# Patient Record
Sex: FEMALE | Race: OTHER | HISPANIC OR LATINO | URBAN - METROPOLITAN AREA
[De-identification: names, ages, dates, MRNs, and addresses within clinical notes are randomized per-mention and may not be internally consistent; named-entity substitution may affect disease eponyms.]

---

## 2018-01-16 ENCOUNTER — EMERGENCY (EMERGENCY)
Facility: HOSPITAL | Age: 23
LOS: 1 days | Discharge: HOME | End: 2018-01-16
Attending: EMERGENCY MEDICINE
Payer: MEDICAID

## 2018-01-16 VITALS
WEIGHT: 149.91 LBS | OXYGEN SATURATION: 99 % | RESPIRATION RATE: 20 BRPM | HEART RATE: 82 BPM | SYSTOLIC BLOOD PRESSURE: 108 MMHG | TEMPERATURE: 98 F | HEIGHT: 62 IN | DIASTOLIC BLOOD PRESSURE: 70 MMHG

## 2018-01-16 PROCEDURE — 99284 EMERGENCY DEPT VISIT MOD MDM: CPT | Mod: 25

## 2018-01-16 RX ORDER — SODIUM CHLORIDE 9 MG/ML
3 INJECTION INTRAMUSCULAR; INTRAVENOUS; SUBCUTANEOUS ONCE
Qty: 0 | Refills: 0 | Status: COMPLETED | OUTPATIENT
Start: 2018-01-16 | End: 2018-01-16

## 2018-01-17 LAB
ALBUMIN SERPL ELPH-MCNC: 3.7 G/DL — SIGNIFICANT CHANGE UP (ref 3.5–5)
ALP SERPL-CCNC: 40 U/L — SIGNIFICANT CHANGE UP (ref 40–120)
ALT FLD-CCNC: 14 U/L DA — SIGNIFICANT CHANGE UP (ref 10–60)
ANION GAP SERPL CALC-SCNC: 8 MMOL/L — SIGNIFICANT CHANGE UP (ref 5–17)
APPEARANCE UR: CLEAR — SIGNIFICANT CHANGE UP
AST SERPL-CCNC: 11 U/L — SIGNIFICANT CHANGE UP (ref 10–40)
BASOPHILS # BLD AUTO: 0.1 K/UL — SIGNIFICANT CHANGE UP (ref 0–0.2)
BASOPHILS NFR BLD AUTO: 0.7 % — SIGNIFICANT CHANGE UP (ref 0–2)
BILIRUB SERPL-MCNC: 0.3 MG/DL — SIGNIFICANT CHANGE UP (ref 0.2–1.2)
BILIRUB UR-MCNC: NEGATIVE — SIGNIFICANT CHANGE UP
BUN SERPL-MCNC: 10 MG/DL — SIGNIFICANT CHANGE UP (ref 7–18)
CALCIUM SERPL-MCNC: 8.6 MG/DL — SIGNIFICANT CHANGE UP (ref 8.4–10.5)
CHLORIDE SERPL-SCNC: 104 MMOL/L — SIGNIFICANT CHANGE UP (ref 96–108)
CO2 SERPL-SCNC: 23 MMOL/L — SIGNIFICANT CHANGE UP (ref 22–31)
COLOR SPEC: YELLOW — SIGNIFICANT CHANGE UP
CREAT SERPL-MCNC: 0.5 MG/DL — SIGNIFICANT CHANGE UP (ref 0.5–1.3)
DIFF PNL FLD: ABNORMAL
EOSINOPHIL # BLD AUTO: 0.2 K/UL — SIGNIFICANT CHANGE UP (ref 0–0.5)
EOSINOPHIL NFR BLD AUTO: 1.9 % — SIGNIFICANT CHANGE UP (ref 0–6)
GLUCOSE SERPL-MCNC: 80 MG/DL — SIGNIFICANT CHANGE UP (ref 70–99)
GLUCOSE UR QL: NEGATIVE — SIGNIFICANT CHANGE UP
HCG SERPL-ACNC: SIGNIFICANT CHANGE UP MIU/ML
HCT VFR BLD CALC: 34.2 % — LOW (ref 34.5–45)
HGB BLD-MCNC: 9.9 G/DL — LOW (ref 11.5–15.5)
KETONES UR-MCNC: NEGATIVE — SIGNIFICANT CHANGE UP
LEUKOCYTE ESTERASE UR-ACNC: ABNORMAL
LYMPHOCYTES # BLD AUTO: 1.7 K/UL — SIGNIFICANT CHANGE UP (ref 1–3.3)
LYMPHOCYTES # BLD AUTO: 18.5 % — SIGNIFICANT CHANGE UP (ref 13–44)
MCHC RBC-ENTMCNC: 21.2 PG — LOW (ref 27–34)
MCHC RBC-ENTMCNC: 29 GM/DL — LOW (ref 32–36)
MCV RBC AUTO: 73 FL — LOW (ref 80–100)
MONOCYTES # BLD AUTO: 0.8 K/UL — SIGNIFICANT CHANGE UP (ref 0–0.9)
MONOCYTES NFR BLD AUTO: 8.4 % — SIGNIFICANT CHANGE UP (ref 2–14)
NEUTROPHILS # BLD AUTO: 6.5 K/UL — SIGNIFICANT CHANGE UP (ref 1.8–7.4)
NEUTROPHILS NFR BLD AUTO: 70.5 % — SIGNIFICANT CHANGE UP (ref 43–77)
NITRITE UR-MCNC: NEGATIVE — SIGNIFICANT CHANGE UP
PH UR: 5 — SIGNIFICANT CHANGE UP (ref 5–8)
PLATELET # BLD AUTO: 262 K/UL — SIGNIFICANT CHANGE UP (ref 150–400)
POTASSIUM SERPL-MCNC: 3.7 MMOL/L — SIGNIFICANT CHANGE UP (ref 3.5–5.3)
POTASSIUM SERPL-SCNC: 3.7 MMOL/L — SIGNIFICANT CHANGE UP (ref 3.5–5.3)
PROT SERPL-MCNC: 7.3 G/DL — SIGNIFICANT CHANGE UP (ref 6–8.3)
PROT UR-MCNC: NEGATIVE — SIGNIFICANT CHANGE UP
RBC # BLD: 4.68 M/UL — SIGNIFICANT CHANGE UP (ref 3.8–5.2)
RBC # FLD: 17.7 % — HIGH (ref 10.3–14.5)
SODIUM SERPL-SCNC: 135 MMOL/L — SIGNIFICANT CHANGE UP (ref 135–145)
SP GR SPEC: 1.02 — SIGNIFICANT CHANGE UP (ref 1.01–1.02)
UROBILINOGEN FLD QL: NEGATIVE — SIGNIFICANT CHANGE UP
WBC # BLD: 9.2 K/UL — SIGNIFICANT CHANGE UP (ref 3.8–10.5)
WBC # FLD AUTO: 9.2 K/UL — SIGNIFICANT CHANGE UP (ref 3.8–10.5)

## 2018-01-17 PROCEDURE — 86850 RBC ANTIBODY SCREEN: CPT

## 2018-01-17 PROCEDURE — 81001 URINALYSIS AUTO W/SCOPE: CPT

## 2018-01-17 PROCEDURE — 86900 BLOOD TYPING SEROLOGIC ABO: CPT

## 2018-01-17 PROCEDURE — 76817 TRANSVAGINAL US OBSTETRIC: CPT | Mod: 26

## 2018-01-17 PROCEDURE — 76815 OB US LIMITED FETUS(S): CPT | Mod: 26

## 2018-01-17 PROCEDURE — 76830 TRANSVAGINAL US NON-OB: CPT

## 2018-01-17 PROCEDURE — 86901 BLOOD TYPING SEROLOGIC RH(D): CPT

## 2018-01-17 PROCEDURE — 85027 COMPLETE CBC AUTOMATED: CPT

## 2018-01-17 PROCEDURE — 84702 CHORIONIC GONADOTROPIN TEST: CPT

## 2018-01-17 PROCEDURE — 80053 COMPREHEN METABOLIC PANEL: CPT

## 2018-01-17 PROCEDURE — 76801 OB US < 14 WKS SINGLE FETUS: CPT

## 2018-01-17 PROCEDURE — 99284 EMERGENCY DEPT VISIT MOD MDM: CPT | Mod: 25

## 2018-01-17 RX ORDER — ACETAMINOPHEN 500 MG
650 TABLET ORAL ONCE
Qty: 0 | Refills: 0 | Status: COMPLETED | OUTPATIENT
Start: 2018-01-17 | End: 2018-01-17

## 2018-01-17 RX ADMIN — SODIUM CHLORIDE 3 MILLILITER(S): 9 INJECTION INTRAMUSCULAR; INTRAVENOUS; SUBCUTANEOUS at 00:45

## 2018-01-17 NOTE — ED PROVIDER NOTE - MEDICAL DECISION MAKING DETAILS
feels better. labs, ua, sono reassuring.   Discussed anticipatory guidance and return precautions. Discussed results and gave copy to pt.  Dc with outpt follow up.

## 2018-01-17 NOTE — ED PROVIDER NOTE - OBJECTIVE STATEMENT
21 y/o female with no significant PMHx presents to the ED c/o worsening abd pain x 3 weeks. Pt notes she is currently 8 weeks pregnant and she has been having pain and discomfort in her L ovary. Pt notes the pain used to be intermittent and last about 5 mins but has been constant today. Pt notes her LMP x 2 months in early Nov and notes taking Plan B in Nov after her LMP. Pt took a pregnancy test afterwards to figure out she was pregnant, Pt denies N/V/D, or any other complaints. NKDA. 23 y/o female with no significant PMHx presents to the ED c/o worsening abd pain x 3 weeks. Pt notes she is currently 8 weeks pregnant and she has been having pain and discomfort in her L ovary. Pt notes the pain used to be intermittent and last about 5 mins but has been constant today. Pt notes her LMP x 2 months in early Nov and notes taking Plan B in Nov after her LMP. Pt took a pregnancy test afterwards to figure out she was pregnant, Pt denies N/V/D, or any other complaints. NKDA. No vaginal bleeding or dc now.

## 2021-03-05 ENCOUNTER — INPATIENT (INPATIENT)
Facility: HOSPITAL | Age: 26
LOS: 4 days | Discharge: HOME | DRG: 176 | End: 2021-03-10
Attending: INTERNAL MEDICINE | Admitting: INTERNAL MEDICINE
Payer: MEDICAID

## 2021-03-05 VITALS
HEIGHT: 62 IN | DIASTOLIC BLOOD PRESSURE: 60 MMHG | SYSTOLIC BLOOD PRESSURE: 108 MMHG | WEIGHT: 149.03 LBS | HEART RATE: 113 BPM | RESPIRATION RATE: 18 BRPM | TEMPERATURE: 98 F

## 2021-03-05 DIAGNOSIS — I26.99 OTHER PULMONARY EMBOLISM WITHOUT ACUTE COR PULMONALE: ICD-10-CM

## 2021-03-05 DIAGNOSIS — Z29.9 ENCOUNTER FOR PROPHYLACTIC MEASURES, UNSPECIFIED: ICD-10-CM

## 2021-03-05 DIAGNOSIS — S93.05XD DISLOCATION OF LEFT ANKLE JOINT, SUBSEQUENT ENCOUNTER: ICD-10-CM

## 2021-03-05 LAB
ALBUMIN SERPL ELPH-MCNC: 3.8 G/DL — SIGNIFICANT CHANGE UP (ref 3.5–5)
ALP SERPL-CCNC: 59 U/L — SIGNIFICANT CHANGE UP (ref 40–120)
ALT FLD-CCNC: 20 U/L DA — SIGNIFICANT CHANGE UP (ref 10–60)
ANION GAP SERPL CALC-SCNC: 10 MMOL/L — SIGNIFICANT CHANGE UP (ref 5–17)
AST SERPL-CCNC: 8 U/L — LOW (ref 10–40)
BASOPHILS # BLD AUTO: 0.03 K/UL — SIGNIFICANT CHANGE UP (ref 0–0.2)
BASOPHILS NFR BLD AUTO: 0.4 % — SIGNIFICANT CHANGE UP (ref 0–2)
BILIRUB SERPL-MCNC: 0.8 MG/DL — SIGNIFICANT CHANGE UP (ref 0.2–1.2)
BUN SERPL-MCNC: 8 MG/DL — SIGNIFICANT CHANGE UP (ref 7–18)
CALCIUM SERPL-MCNC: 9.1 MG/DL — SIGNIFICANT CHANGE UP (ref 8.4–10.5)
CHLORIDE SERPL-SCNC: 103 MMOL/L — SIGNIFICANT CHANGE UP (ref 96–108)
CHOLEST SERPL-MCNC: 69 MG/DL — SIGNIFICANT CHANGE UP
CO2 SERPL-SCNC: 25 MMOL/L — SIGNIFICANT CHANGE UP (ref 22–31)
CREAT SERPL-MCNC: 0.68 MG/DL — SIGNIFICANT CHANGE UP (ref 0.5–1.3)
D DIMER BLD IA.RAPID-MCNC: 420 NG/ML DDU — HIGH
EOSINOPHIL # BLD AUTO: 0.04 K/UL — SIGNIFICANT CHANGE UP (ref 0–0.5)
EOSINOPHIL NFR BLD AUTO: 0.5 % — SIGNIFICANT CHANGE UP (ref 0–6)
GLUCOSE SERPL-MCNC: 80 MG/DL — SIGNIFICANT CHANGE UP (ref 70–99)
HCG UR QL: NEGATIVE — SIGNIFICANT CHANGE UP
HCT VFR BLD CALC: 39.2 % — SIGNIFICANT CHANGE UP (ref 34.5–45)
HDLC SERPL-MCNC: 44 MG/DL — LOW
HGB BLD-MCNC: 12.3 G/DL — SIGNIFICANT CHANGE UP (ref 11.5–15.5)
IMM GRANULOCYTES NFR BLD AUTO: 0.5 % — SIGNIFICANT CHANGE UP (ref 0–1.5)
INR BLD: 1.35 RATIO — HIGH (ref 0.88–1.16)
LIPID PNL WITH DIRECT LDL SERPL: 19 MG/DL — SIGNIFICANT CHANGE UP
LYMPHOCYTES # BLD AUTO: 0.93 K/UL — LOW (ref 1–3.3)
LYMPHOCYTES # BLD AUTO: 11.8 % — LOW (ref 13–44)
MAGNESIUM SERPL-MCNC: 2.3 MG/DL — SIGNIFICANT CHANGE UP (ref 1.6–2.6)
MCHC RBC-ENTMCNC: 26.8 PG — LOW (ref 27–34)
MCHC RBC-ENTMCNC: 31.4 GM/DL — LOW (ref 32–36)
MCV RBC AUTO: 85.4 FL — SIGNIFICANT CHANGE UP (ref 80–100)
MONOCYTES # BLD AUTO: 0.44 K/UL — SIGNIFICANT CHANGE UP (ref 0–0.9)
MONOCYTES NFR BLD AUTO: 5.6 % — SIGNIFICANT CHANGE UP (ref 2–14)
NEUTROPHILS # BLD AUTO: 6.4 K/UL — SIGNIFICANT CHANGE UP (ref 1.8–7.4)
NEUTROPHILS NFR BLD AUTO: 81.2 % — HIGH (ref 43–77)
NON HDL CHOLESTEROL: 25 MG/DL — SIGNIFICANT CHANGE UP
NRBC # BLD: 0 /100 WBCS — SIGNIFICANT CHANGE UP (ref 0–0)
PHOSPHATE SERPL-MCNC: 2.9 MG/DL — SIGNIFICANT CHANGE UP (ref 2.5–4.5)
PLATELET # BLD AUTO: 369 K/UL — SIGNIFICANT CHANGE UP (ref 150–400)
POTASSIUM SERPL-MCNC: 4.1 MMOL/L — SIGNIFICANT CHANGE UP (ref 3.5–5.3)
POTASSIUM SERPL-SCNC: 4.1 MMOL/L — SIGNIFICANT CHANGE UP (ref 3.5–5.3)
PROT SERPL-MCNC: 8 G/DL — SIGNIFICANT CHANGE UP (ref 6–8.3)
PROTHROM AB SERPL-ACNC: 15.8 SEC — HIGH (ref 10.6–13.6)
RAPID RVP RESULT: SIGNIFICANT CHANGE UP
RBC # BLD: 4.59 M/UL — SIGNIFICANT CHANGE UP (ref 3.8–5.2)
RBC # FLD: 13.9 % — SIGNIFICANT CHANGE UP (ref 10.3–14.5)
SARS-COV-2 RNA SPEC QL NAA+PROBE: SIGNIFICANT CHANGE UP
SODIUM SERPL-SCNC: 138 MMOL/L — SIGNIFICANT CHANGE UP (ref 135–145)
TRIGL SERPL-MCNC: 32 MG/DL — SIGNIFICANT CHANGE UP
TROPONIN I SERPL-MCNC: <0.015 NG/ML — SIGNIFICANT CHANGE UP (ref 0–0.04)
TSH SERPL-MCNC: 1.59 UU/ML — SIGNIFICANT CHANGE UP (ref 0.34–4.82)
WBC # BLD: 7.88 K/UL — SIGNIFICANT CHANGE UP (ref 3.8–10.5)
WBC # FLD AUTO: 7.88 K/UL — SIGNIFICANT CHANGE UP (ref 3.8–10.5)

## 2021-03-05 PROCEDURE — 99222 1ST HOSP IP/OBS MODERATE 55: CPT

## 2021-03-05 PROCEDURE — 71045 X-RAY EXAM CHEST 1 VIEW: CPT | Mod: 26

## 2021-03-05 PROCEDURE — 93970 EXTREMITY STUDY: CPT | Mod: 26

## 2021-03-05 PROCEDURE — 71275 CT ANGIOGRAPHY CHEST: CPT | Mod: 26,MA

## 2021-03-05 PROCEDURE — 99285 EMERGENCY DEPT VISIT HI MDM: CPT

## 2021-03-05 RX ORDER — PANTOPRAZOLE SODIUM 20 MG/1
40 TABLET, DELAYED RELEASE ORAL
Refills: 0 | Status: DISCONTINUED | OUTPATIENT
Start: 2021-03-05 | End: 2021-03-10

## 2021-03-05 RX ORDER — ONDANSETRON 8 MG/1
4 TABLET, FILM COATED ORAL ONCE
Refills: 0 | Status: COMPLETED | OUTPATIENT
Start: 2021-03-05 | End: 2021-03-05

## 2021-03-05 RX ORDER — ENOXAPARIN SODIUM 100 MG/ML
70 INJECTION SUBCUTANEOUS EVERY 12 HOURS
Refills: 0 | Status: DISCONTINUED | OUTPATIENT
Start: 2021-03-05 | End: 2021-03-07

## 2021-03-05 RX ORDER — ENOXAPARIN SODIUM 100 MG/ML
67 INJECTION SUBCUTANEOUS ONCE
Refills: 0 | Status: COMPLETED | OUTPATIENT
Start: 2021-03-05 | End: 2021-03-05

## 2021-03-05 RX ADMIN — ONDANSETRON 4 MILLIGRAM(S): 8 TABLET, FILM COATED ORAL at 11:13

## 2021-03-05 RX ADMIN — ENOXAPARIN SODIUM 67 MILLIGRAM(S): 100 INJECTION SUBCUTANEOUS at 19:04

## 2021-03-05 NOTE — H&P ADULT - NSHPPHYSICALEXAM_GEN_ALL_CORE
Vital Signs (24 Hrs):  T(C): 37.6 (03-05-21 @ 15:05), Max: 37.6 (03-05-21 @ 15:05)  HR: 98 (03-05-21 @ 15:05) (92 - 113)  BP: 121/79 (03-05-21 @ 15:05) (100/60 - 121/79)  RR: 18 (03-05-21 @ 15:05) (18 - 19)  SpO2: 97% (03-05-21 @ 15:05) (97% - 99%)

## 2021-03-05 NOTE — CONSULT NOTE ADULT - ASSESSMENT
25y Female with medical history significant of Anemia, herpes presented with chest pain since morning. She sprained her ankle in January. She had discloacation, had closed reduction done after which she was supposed to get MRI done which she never got. She has been walking using crutches, mostly bed bound.   Vascular was consulted because admitting officer noticed decreased pulses on the Rt injured foot . No rest pain ,+excellent palpable Rt PT/DP pulses and on doppler, mild edema , no paresthesia or tingling on foot . All sensation intact     f/up Arterial Duplex  f/up Rt LE CTA   f/u Ortho consult   Other care per primary team   Reconsult vascular prn  25y Female with medical history significant of Anemia, herpes presented with chest pain since morning. She sprained her ankle in January. She had discloacation, had closed reduction done after which she was supposed to get MRI done which she never got. She has been walking using crutches, mostly bed bound.   Vascular was consulted because admitting officer noticed decreased pulses on the Rt injured foot . No rest pain ,+excellent palpable Rt PT/DP pulses and on doppler, mild edema , no paresthesia or tingling on foot . All sensation intact     f/up Arterial Duplex  f/up Rt LE CTA   f/u Ortho consult   Other care per primary team   will follow

## 2021-03-05 NOTE — H&P ADULT - ASSESSMENT
25y Female with medical history significant of Anemia, herpes presented with chest pain since morning.     ED:  CT chest showed PE    Patient is being admitted to [facility] for PE.

## 2021-03-05 NOTE — ED PROVIDER NOTE - PHYSICAL EXAMINATION
PHYSICAL EXAM:  GENERAL: NAD, obese  HEAD:  Atraumatic, Normocephalic, HERPES LABIALIS   EYES:  conjunctiva and sclera clear  NECK: Supple, No JVD, Normal thyroid  CHEST/LUNG: Clear to auscultation. Clear to percussion bilaterally; No rales, rhonchi, wheezing, or rubs  HEART: Regular rate and rhythm; No murmurs, rubs, or gallops  ABDOMEN: Soft, Nontender, Nondistended; Bowel sounds present  NERVOUS SYSTEM:  Alert & Oriented X3,    EXTREMITIES:  2+ Peripheral Pulses, No clubbing, cyanosis, or edema, HERPES FLORENCIO on left middle finger  SKIN: warm dry PHYSICAL EXAM:  GENERAL: NAD, obese  HEAD:  Atraumatic, Normocephalic, HERPES LABIALIS   EYES:  conjunctiva and sclera clear  NECK: Supple, No JVD, Normal thyroid  CHEST/LUNG: Clear to auscultation. Clear to percussion bilaterally; No rales, rhonchi, wheezing, or rubs  HEART: Regular rate and rhythm; No murmurs, rubs, or gallops  ABDOMEN: Soft, Nontender, Nondistended; Bowel sounds present  NERVOUS SYSTEM:  Alert & Oriented X3,    EXTREMITIES:  2+ Peripheral Pulses, No clubbing, cyanosis, or edema, HERPES FLORENCIO on left middle finger  SKIN: warm dry, no chest wall lesions

## 2021-03-05 NOTE — ED ADULT NURSE NOTE - ED STAT RN HANDOFF DETAILS
Pt is alert and oriented x3. No sign of acute distress noted. Safety precaution maintained. Cardiac monitor in progress. Pt is transferred to holding in stable condiiton.

## 2021-03-05 NOTE — ED ADULT NURSE NOTE - RESPIRATION RHYTHM, QM
Amoxicillin for 10 days as directed.     Take probiotic (Floragen, yogurt) while on antibiotic.    Ibuprofen and/or tylenol for fever/ pain/ achiness.    Over the counter cold and sinus medication as needed.  Use medications formulated for people with high blood pressure.    Mucinex may help sinuses drain.    Maintain good oral hydration.    Follow up in 3-4 days if no improvement, sooner if symptoms are getting worse or new concerns.  
regular

## 2021-03-05 NOTE — H&P ADULT - ATTENDING COMMENTS
PATIENT SEEN AND EXAMINED. CASE D/W ER MD AND RESIDENT TEAM. ABOVE ROS/VS/PE REVIEWED AND VERIFIED.    HPI:    25y Female with medical history significant of Anemia, herpes presented with chest pain since morning. She states the chest pain is on left side, sharp pain, lasting for few seconds, not aggravated with exercise. It is associated with palpitations and nausea.. She was diagnosed with Herpes and received Acyclovir and IVF on Feb 23rd.  She had allergic reaction to acyclovir after 1 st dose and later stopped it. She sprained her ankle in January. She had discloacation, had closed reduction done after which she was supposed to get MRI done which she never got. She has been walking using crutches, mostly bed bound.  She is not eating well and lost 12 pounds in a month. Pt is not sexually active and has one partner. Patient denies rash, joint pain, cough, sputum production, fevers/chills/nausea/vomiting, diarrhea, abdominal pain    PLAN:    # PULMONARY EMBOLISM - PLACED ON LOVENOX, REVIEWED CTA CHEST, LOWER EXTREMITY DOPPLER NEGATIVE, VASCULAR SX CONSULT PLACED, HEME/ONC CONSULT - DR. GAN  - NOTED PATIENT IS STILL BREAST FEEDING; LAST PREGNANCY WAS 2 YEARS AGO  - F/U COVID AB  - DENIES FAMILY HX OF AUTOIMMUNE DISEASE, DENIES HX OF MISCARRIAGES, DENIES HX OF COVID ; HAS BEEN NWB AND SEDENTARY SINCE RECENT ANKLE SPRAIN  # S/P RECENT ANKLE SPRAIN WITH DISLOCATION - ? WAS TOLD TO BE NWB - WILL CONSULT ORTHO TO EVALUATE FURTHER  # HX OF ANEMIA  # HX OF HSV  # GI PPX ; ON THERAPEUTIC LOVENOX [SERVES FOR DVT PREVENTION]    CARLOS MERCHANT MD COVERING BLAS MERCHANT MD

## 2021-03-05 NOTE — ED PROVIDER NOTE - CLINICAL SUMMARY MEDICAL DECISION MAKING FREE TEXT BOX
The patient is a 25y Female with PMH of Anemia complaining of chest pain since morning. Will do labs, ekg and imaging. Will reassess again.

## 2021-03-05 NOTE — H&P ADULT - PROBLEM SELECTOR PLAN 1
Presented with dyspnea  Saturating well on RA  CT showed PE  Patient has been immobilized most of the time since JAnuary  Started on lovenox  f/u US doppler of LE  PE is likely provoked Presented with dyspnea  Saturating well on RA  CT showed PE  Patient has been immobilized most of the time since JAnuary  Started on lovenox  f/u US doppler of LE  PE is likely provoked  hem onc consult Dr Hernandez

## 2021-03-05 NOTE — CONSULT NOTE ADULT - PROBLEM SELECTOR RECOMMENDATION 9
I Festus Ragland MD performed a history and physical exam of the patient and discussed  the findings and plan with the house officer. I reviewed the resident note and agree with the findings and plan

## 2021-03-05 NOTE — CONSULT NOTE ADULT - SUBJECTIVE AND OBJECTIVE BOX
VASCULAR SURGERY CONSULT NOTE    chief complaint of Chest pain (05 Mar 2021 18:58)      HPI:  25y Female with medical history significant of Anemia, herpes presented with chest pain since morning. She states the chest pain is on left side, sharp pain, lasting for few seconds, not aggravated with exercise. It is associated with palpitations and nausea.. She was diagnosed with Herpes and received Acyclovir and IVF on Feb 23rd. . She sprained her ankle in January. She had discloacation, had closed reduction done after which she was supposed to get MRI done which she never got. She has been walking using crutches, mostly bed bound.   Patient denies rash, joint pain, cough, sputum production, fevers/chills/nausea/vomiting, diarrhea, abdominal pain (05 Mar 2021 18:58)    Vascular was consulted because admitting officer noticed decreased pulses on the Rt injured foot . Presently, pt is seen with mild edema in rt foot with little to no weight bearing but denies any pain on rt leg , skin changes, paresthesia, or tingling . No other complaints       PAST MEDICAL & SURGICAL HISTORY:  Anemia    No pertinent past medical history    No significant past surgical history        Review of Systems:    I have reviewed 9 systems with the patient and the only positive findings were     MEDICATIONS  (STANDING):  enoxaparin Injectable 70 milliGRAM(s) SubCutaneous every 12 hours  pantoprazole    Tablet 40 milliGRAM(s) Oral before breakfast    MEDICATIONS  (PRN):      Allergies    No Known Allergies    Intolerances        Social History:  Denies smoking or drinking (05 Mar 2021 18:58)      FAMILY HISTORY:      Vital Signs Last 24 Hrs  T(C): 37.5 (05 Mar 2021 19:54), Max: 37.6 (05 Mar 2021 15:05)  T(F): 99.5 (05 Mar 2021 19:54), Max: 99.6 (05 Mar 2021 15:05)  HR: 89 (05 Mar 2021 19:54) (89 - 113)  BP: 110/69 (05 Mar 2021 19:54) (100/60 - 121/79)  BP(mean): --  RR: 18 (05 Mar 2021 19:54) (18 - 19)  SpO2: 99% (05 Mar 2021 19:54) (97% - 99%)    Physical Exam:  Vital Signs Last 24 Hrs  T(C): 37.5 (05 Mar 2021 19:54), Max: 37.6 (05 Mar 2021 15:05)  T(F): 99.5 (05 Mar 2021 19:54), Max: 99.6 (05 Mar 2021 15:05)  HR: 89 (05 Mar 2021 19:54) (89 - 113)  BP: 110/69 (05 Mar 2021 19:54) (100/60 - 121/79)  BP(mean): --  RR: 18 (05 Mar 2021 19:54) (18 - 19)  SpO2: 99% (05 Mar 2021 19:54) (97% - 99%)    General:  A&Ox3,Appears stated age, No acute distress,  Head: NC/AT  EENT: PERRLA. EOMI. Conjunctiva and sclera clear. Pharynx clear.  Neck: Supple. No JVD  Lungs: CTA B/l. Nonlabored Respirations  CV: +S1S2, RRR  Abdomen: Soft, Nondistended,  Nontender, no guarding, no rebound  Extremities: Warm and well perfused. 2+ peripheral pulses b/l. Calf soft, nontender b/l. mild pedal edema on rt foot. No rest pain      LABS:                        12.3   7.88  )-----------( 369      ( 05 Mar 2021 11:17 )             39.2     03-05    138  |  103  |  8   ----------------------------<  80  4.1   |  25  |  0.68    Ca    9.1      05 Mar 2021 11:17  Phos  2.9     03-05  Mg     2.3     03-05    TPro  8.0  /  Alb  3.8  /  TBili  0.8  /  DBili  x   /  AST  8<L>  /  ALT  20  /  AlkPhos  59  03-05    LIVER FUNCTIONS - ( 05 Mar 2021 11:17 )  Alb: 3.8 g/dL / Pro: 8.0 g/dL / ALK PHOS: 59 U/L / ALT: 20 U/L DA / AST: 8 U/L / GGT: x           PT/INR - ( 05 Mar 2021 19:09 )   PT: 15.8 sec;   INR: 1.35 ratio               RADIOLOGY & ADDITIONAL STUDIES:  < from: US Duplex Venous Lower Ext Complete, Bilateral (03.05.21 @ 22:06) >  FINDINGS:    There is normal compressibility of the bilateral common femoral, femoral and popliteal veins.  Doppler examination shows normal spontaneous and phasic flow.    No calf vein thrombosis is detected.    IMPRESSION:  No evidence of deep venous thrombosis in either lower extremity.    < end of copied text >

## 2021-03-05 NOTE — ED PROVIDER NOTE - OBJECTIVE STATEMENT
The patient is a 25y Female with PMH of Anemia complaining of chest pain since morning. She states the chest pain is on left side, sharp pain, lasting for few seconds, not aggravated with exercise. It is associated with palpitations and nausea.. She was diagnosed with Herpes and received Acyclovir and IVF on Feb 23rd. She had swollen lips and discontinued. She is not eating well and lost 12 pounds in a month. Pt is not sexually active and has one partner. Patient denies rash, joint pain, cough, sputum production, fevers/chills/nausea/vomiting, diarrhea, abdominal pain.

## 2021-03-05 NOTE — H&P ADULT - HISTORY OF PRESENT ILLNESS
25y Female with medical history significant of Anemia, herpes presented with chest pain since morning. She states the chest pain is on left side, sharp pain, lasting for few seconds, not aggravated with exercise. It is associated with palpitations and nausea.. She was diagnosed with Herpes and received Acyclovir and IVF on Feb 23rd.  She had allergic reaction to acyclovir after 1 st dose and later stopped it. She sprained her ankle in January. She had discloacation, had closed reduction done after which she was supposed to get MRI done which she never got. She has been walking using crutches, mostly bed bound.  She is not eating well and lost 12 pounds in a month. Pt is not sexually active and has one partner. Patient denies rash, joint pain, cough, sputum production, fevers/chills/nausea/vomiting, diarrhea, abdominal pain

## 2021-03-05 NOTE — ED PROVIDER NOTE - PROGRESS NOTE DETAILS
Avila: d dimer 420 likely no pe but since pt c/o persistent sharp pain will do cta chest Avila: hcg pending and then cta. [place]: s/o to dr rosario to f/u cta. likely dc Educated pt on positive CTA findings, need for admissiojn. Pt now relates rt leg/ankle injury in January, pt had xray negative per pt, states she was waiting to get MRI. Has had difficulty ambulating using RLE since injury. Pt with palpable RLE DP pulses which were also hear clearly on bedside doppler. Reinforced importance of getting leg venous doppler for PE workup.

## 2021-03-05 NOTE — H&P ADULT - NSHPATTENDINGPLANDISCUSS_GEN_ALL_CORE
PATIENT AND FLOOR STAFF

## 2021-03-05 NOTE — ED ADULT NURSE NOTE - NSIMPLEMENTINTERV_GEN_ALL_ED
Implemented All Fall with Harm Risk Interventions:  [place] to call system. Call bell, personal items and telephone within reach. Instruct patient to call for assistance. Room bathroom lighting operational. Non-slip footwear when patient is off stretcher. Physically safe environment: no spills, clutter or unnecessary equipment. Stretcher in lowest position, wheels locked, appropriate side rails in place. Provide visual cue, wrist band, yellow gown, etc. Monitor gait and stability. Monitor for mental status changes and reorient to person, place, and time. Review medications for side effects contributing to fall risk. Reinforce activity limits and safety measures with patient and family. Provide visual clues: red socks.

## 2021-03-05 NOTE — H&P ADULT - PROBLEM SELECTOR PLAN 2
had ankle injury in January  Has been using crutches since then  HAs been immobile for msot of the time  Consult ortho in [place]  Also has decreased pulses on R DP, and R popliteal artery  f/u BAYLEE had ankle injury in January  Has been using crutches since then  HAs been immobile for most of the time  Consult ortho in [place]  Also has decreased pulses on R DP, and R popliteal artery  vascular surgery was consulted  f/u CTA of lower extremity

## 2021-03-05 NOTE — ED PROVIDER NOTE - ATTENDING CONTRIBUTION TO CARE
I was physically present for the E/M service provided. I agree with above history, physical, and plan which I have reviewed and edited where appropriate. I was physically present for the key portions of the service provided.    Avila: The patient is a 25y Female with PMH of Anemia complaining of chest pain since morning. She states the chest pain is on left side, sharp pain, lasting for few seconds, not aggravated with exercise. It is associated with palpitations and nausea. numbness sensation at substernal since 8am. no fam hx of sudden cardiac death. no fver, no dysuria, no vag discharge    *GEN:   comfortable, in no apparent distress, AOx3  *CV:   regular rate and rhythm, normal S1/S2, no murmur  *RESP:   clear to auscultation bilaterally, non-labored, speaking in full sentences  *ABD:   soft, non tender, no guarding  *MSK:   no musculoskeletal tenderness, 5/5 strength, moving all extremity  *SKIN:   dry, intact, no rash  *NEURO:   AOx3, no focal weakness or loss of sensation, gait normal, GCS 15    a/p: chest pain likely reflux vs anxiety r/o ptx vs pe - labs, cxr, ekg, zofran, upreg, re-assess

## 2021-03-06 DIAGNOSIS — I73.9 PERIPHERAL VASCULAR DISEASE, UNSPECIFIED: ICD-10-CM

## 2021-03-06 LAB
A1C WITH ESTIMATED AVERAGE GLUCOSE RESULT: 5.3 % — SIGNIFICANT CHANGE UP (ref 4–5.6)
ALBUMIN SERPL ELPH-MCNC: 3.4 G/DL — LOW (ref 3.5–5)
ALP SERPL-CCNC: 54 U/L — SIGNIFICANT CHANGE UP (ref 40–120)
ALT FLD-CCNC: 16 U/L DA — SIGNIFICANT CHANGE UP (ref 10–60)
ANION GAP SERPL CALC-SCNC: 10 MMOL/L — SIGNIFICANT CHANGE UP (ref 5–17)
AST SERPL-CCNC: 8 U/L — LOW (ref 10–40)
BASOPHILS # BLD AUTO: 0.04 K/UL — SIGNIFICANT CHANGE UP (ref 0–0.2)
BASOPHILS NFR BLD AUTO: 0.8 % — SIGNIFICANT CHANGE UP (ref 0–2)
BILIRUB SERPL-MCNC: 0.8 MG/DL — SIGNIFICANT CHANGE UP (ref 0.2–1.2)
BUN SERPL-MCNC: 8 MG/DL — SIGNIFICANT CHANGE UP (ref 7–18)
CALCIUM SERPL-MCNC: 9 MG/DL — SIGNIFICANT CHANGE UP (ref 8.4–10.5)
CHLORIDE SERPL-SCNC: 107 MMOL/L — SIGNIFICANT CHANGE UP (ref 96–108)
CO2 SERPL-SCNC: 24 MMOL/L — SIGNIFICANT CHANGE UP (ref 22–31)
CREAT SERPL-MCNC: 0.62 MG/DL — SIGNIFICANT CHANGE UP (ref 0.5–1.3)
EOSINOPHIL # BLD AUTO: 0.06 K/UL — SIGNIFICANT CHANGE UP (ref 0–0.5)
EOSINOPHIL NFR BLD AUTO: 1.2 % — SIGNIFICANT CHANGE UP (ref 0–6)
ESTIMATED AVERAGE GLUCOSE: 105 MG/DL — SIGNIFICANT CHANGE UP (ref 68–114)
FOLATE SERPL-MCNC: 15.9 NG/ML — SIGNIFICANT CHANGE UP
GLUCOSE SERPL-MCNC: 94 MG/DL — SIGNIFICANT CHANGE UP (ref 70–99)
HCT VFR BLD CALC: 37.2 % — SIGNIFICANT CHANGE UP (ref 34.5–45)
HGB BLD-MCNC: 11.6 G/DL — SIGNIFICANT CHANGE UP (ref 11.5–15.5)
IMM GRANULOCYTES NFR BLD AUTO: 0.4 % — SIGNIFICANT CHANGE UP (ref 0–1.5)
LYMPHOCYTES # BLD AUTO: 1.14 K/UL — SIGNIFICANT CHANGE UP (ref 1–3.3)
LYMPHOCYTES # BLD AUTO: 22 % — SIGNIFICANT CHANGE UP (ref 13–44)
MCHC RBC-ENTMCNC: 26.5 PG — LOW (ref 27–34)
MCHC RBC-ENTMCNC: 31.2 GM/DL — LOW (ref 32–36)
MCV RBC AUTO: 85.1 FL — SIGNIFICANT CHANGE UP (ref 80–100)
MONOCYTES # BLD AUTO: 0.45 K/UL — SIGNIFICANT CHANGE UP (ref 0–0.9)
MONOCYTES NFR BLD AUTO: 8.7 % — SIGNIFICANT CHANGE UP (ref 2–14)
NEUTROPHILS # BLD AUTO: 3.47 K/UL — SIGNIFICANT CHANGE UP (ref 1.8–7.4)
NEUTROPHILS NFR BLD AUTO: 66.9 % — SIGNIFICANT CHANGE UP (ref 43–77)
NRBC # BLD: 0 /100 WBCS — SIGNIFICANT CHANGE UP (ref 0–0)
PLATELET # BLD AUTO: 369 K/UL — SIGNIFICANT CHANGE UP (ref 150–400)
POTASSIUM SERPL-MCNC: 4 MMOL/L — SIGNIFICANT CHANGE UP (ref 3.5–5.3)
POTASSIUM SERPL-SCNC: 4 MMOL/L — SIGNIFICANT CHANGE UP (ref 3.5–5.3)
PROT SERPL-MCNC: 7.3 G/DL — SIGNIFICANT CHANGE UP (ref 6–8.3)
RBC # BLD: 4.37 M/UL — SIGNIFICANT CHANGE UP (ref 3.8–5.2)
RBC # FLD: 14.2 % — SIGNIFICANT CHANGE UP (ref 10.3–14.5)
SARS-COV-2 IGG SERPL QL IA: NEGATIVE — SIGNIFICANT CHANGE UP
SARS-COV-2 IGM SERPL IA-ACNC: <0.1 INDEX — SIGNIFICANT CHANGE UP
SODIUM SERPL-SCNC: 141 MMOL/L — SIGNIFICANT CHANGE UP (ref 135–145)
VIT B12 SERPL-MCNC: 812 PG/ML — SIGNIFICANT CHANGE UP (ref 232–1245)
WBC # BLD: 5.18 K/UL — SIGNIFICANT CHANGE UP (ref 3.8–10.5)
WBC # FLD AUTO: 5.18 K/UL — SIGNIFICANT CHANGE UP (ref 3.8–10.5)

## 2021-03-06 PROCEDURE — 99232 SBSQ HOSP IP/OBS MODERATE 35: CPT

## 2021-03-06 PROCEDURE — 93925 LOWER EXTREMITY STUDY: CPT | Mod: 26

## 2021-03-06 RX ORDER — SODIUM CHLORIDE 9 MG/ML
1000 INJECTION INTRAMUSCULAR; INTRAVENOUS; SUBCUTANEOUS
Refills: 0 | Status: DISCONTINUED | OUTPATIENT
Start: 2021-03-06 | End: 2021-03-08

## 2021-03-06 RX ORDER — SOD,AMMONIUM,POTASSIUM LACTATE
1 CREAM (GRAM) TOPICAL
Refills: 0 | Status: DISCONTINUED | OUTPATIENT
Start: 2021-03-06 | End: 2021-03-06

## 2021-03-06 RX ORDER — DIPHENHYDRAMINE HYDROCHLORIDE AND LIDOCAINE HYDROCHLORIDE AND ALUMINUM HYDROXIDE AND MAGNESIUM HYDRO
10 KIT THREE TIMES A DAY
Refills: 0 | Status: DISCONTINUED | OUTPATIENT
Start: 2021-03-06 | End: 2021-03-10

## 2021-03-06 RX ORDER — ACETAMINOPHEN 500 MG
650 TABLET ORAL ONCE
Refills: 0 | Status: COMPLETED | OUTPATIENT
Start: 2021-03-06 | End: 2021-03-06

## 2021-03-06 RX ADMIN — SODIUM CHLORIDE 100 MILLILITER(S): 9 INJECTION INTRAMUSCULAR; INTRAVENOUS; SUBCUTANEOUS at 11:08

## 2021-03-06 RX ADMIN — ENOXAPARIN SODIUM 70 MILLIGRAM(S): 100 INJECTION SUBCUTANEOUS at 05:30

## 2021-03-06 RX ADMIN — PANTOPRAZOLE SODIUM 40 MILLIGRAM(S): 20 TABLET, DELAYED RELEASE ORAL at 05:31

## 2021-03-06 RX ADMIN — DIPHENHYDRAMINE HYDROCHLORIDE AND LIDOCAINE HYDROCHLORIDE AND ALUMINUM HYDROXIDE AND MAGNESIUM HYDRO 10 MILLILITER(S): KIT at 18:30

## 2021-03-06 RX ADMIN — DIPHENHYDRAMINE HYDROCHLORIDE AND LIDOCAINE HYDROCHLORIDE AND ALUMINUM HYDROXIDE AND MAGNESIUM HYDRO 10 MILLILITER(S): KIT at 21:19

## 2021-03-06 RX ADMIN — ENOXAPARIN SODIUM 70 MILLIGRAM(S): 100 INJECTION SUBCUTANEOUS at 18:31

## 2021-03-06 NOTE — PROGRESS NOTE ADULT - ATTENDING COMMENTS
I Festus Ragland MD have personally  seen and examined the patient today and have noted the findings and formulated the plan of care.  I Festus Ragland MD have personally seen and examined the patient at bedside today at  730 pm

## 2021-03-06 NOTE — PROGRESS NOTE ADULT - SUBJECTIVE AND OBJECTIVE BOX
Patient is a 25y old  Female who presents with a chief complaint of Chest pain (05 Mar 2021 23:49)    PATIENT IS SEEN AND EXAMINED IN MEDICAL FLOOR.  GREG [    ]    CASH [   ]      GT [   ]    ALLERGIES:  No Known Allergies      Daily     Daily Weight in k (06 Mar 2021 00:30)    VITALS:    Vital Signs Last 24 Hrs  T(C): 37.2 (06 Mar 2021 11:22), Max: 37.6 (05 Mar 2021 15:05)  T(F): 98.9 (06 Mar 2021 11:22), Max: 99.6 (05 Mar 2021 15:05)  HR: 98 (06 Mar 2021 11:22) (77 - 98)  BP: 99/63 (06 Mar 2021 11:22) (99/63 - 121/79)  BP(mean): --  RR: 17 (06 Mar 2021 11:22) (16 - 18)  SpO2: 97% (06 Mar 2021 11:22) (95% - 99%)    LABS:    CBC Full  -  ( 06 Mar 2021 08:40 )  WBC Count : 5.18 K/uL  RBC Count : 4.37 M/uL  Hemoglobin : 11.6 g/dL  Hematocrit : 37.2 %  Platelet Count - Automated : 369 K/uL  Mean Cell Volume : 85.1 fl  Mean Cell Hemoglobin : 26.5 pg  Mean Cell Hemoglobin Concentration : 31.2 gm/dL  Auto Neutrophil # : 3.47 K/uL  Auto Lymphocyte # : 1.14 K/uL  Auto Monocyte # : 0.45 K/uL  Auto Eosinophil # : 0.06 K/uL  Auto Basophil # : 0.04 K/uL  Auto Neutrophil % : 66.9 %  Auto Lymphocyte % : 22.0 %  Auto Monocyte % : 8.7 %  Auto Eosinophil % : 1.2 %  Auto Basophil % : 0.8 %    PT/INR - ( 05 Mar 2021 19:09 )   PT: 15.8 sec;   INR: 1.35 ratio           03-06    141  |  107  |  8   ----------------------------<  94  4.0   |  24  |  0.62    Ca    9.0      06 Mar 2021 08:40  Phos  2.9     03-05  Mg     2.3     03-05    TPro  7.3  /  Alb  3.4<L>  /  TBili  0.8  /  DBili  x   /  AST  8<L>  /  ALT  16  /  AlkPhos  54  03-06    CAPILLARY BLOOD GLUCOSE        CARDIAC MARKERS ( 05 Mar 2021 11:17 )  <0.015 ng/mL / x     / x     / x     / x          LIVER FUNCTIONS - ( 06 Mar 2021 08:40 )  Alb: 3.4 g/dL / Pro: 7.3 g/dL / ALK PHOS: 54 U/L / ALT: 16 U/L DA / AST: 8 U/L / GGT: x           Creatinine Trend: 0.62<--, 0.68<--  I&O's Summary              MEDICATIONS:    MEDICATIONS  (STANDING):  enoxaparin Injectable 70 milliGRAM(s) SubCutaneous every 12 hours  pantoprazole    Tablet 40 milliGRAM(s) Oral before breakfast  sodium chloride 0.9%. 1000 milliLiter(s) (100 mL/Hr) IV Continuous <Continuous>      MEDICATIONS  (PRN):      REVIEW OF SYSTEMS:                           ALL ROS DONE [ X   ]    CONSTITUTIONAL:  LETHARGIC [   ], FEVER [   ], UNRESPONSIVE [   ]  CVS:  CP  [   ], SOB, [   ], PALPITATIONS [   ], DIZZYNESS [   ]  RS: COUGH [   ], SPUTUM [   ]  GI: ABDOMINAL PAIN [   ], NAUSEA [   ], VOMITINGS [   ], DIARRHEA [   ], CONSTIPATION [   ]  :  DYSURIA [   ], NOCTURIA [   ], INCREASED FREQUENCY [   ], DRIBLING [   ],  SKELETAL: PAINFUL JOINTS [   ], SWOLLEN JOINTS [   ], NECK ACHE [   ], LOW BACK ACHE [   ],  SKIN : ULCERS [   ], RASH [   ], ITCHING [   ]  CNS: HEAD ACHE [   ], DOUBLE VISION [   ], BLURRED VISION [   ], AMS / CONFUSION [   ], SEIZURES [   ], WEAKNESS [   ],TINGLING / NUMBNESS [   ]    PHYSICAL EXAMINATION:  GENERAL APPEARANCE: NO DISTRESS  HEENT:  NO PALLOR, NO  JVD,  NO   NODES, NECK SUPPLE  CVS: S1 +, S2 +,   RS: AEEB,  OCCASIONAL  RALES +,   NO RONCHI  ABD: SOFT, NT, NO, BS +  EXT: NO PE  SKIN: WARM,   SKELETAL:  ROM ACCEPTABLE  CNS:  AAO X 3,   DEFICITS    RADIOLOGY :      ASSESSMENT :     Other pulmonary embolism without acute cor pulmonale    Anemia    No pertinent past medical history    No significant past surgical history        PLAN:  HPI:  25y Female with medical history significant of Anemia, herpes presented with chest pain since morning. She states the chest pain is on left side, sharp pain, lasting for few seconds, not aggravated with exercise. It is associated with palpitations and nausea.. She was diagnosed with Herpes and received Acyclovir and IVF on .  She had allergic reaction to acyclovir after 1 st dose and later stopped it. She sprained her ankle in January. She had discloacation, had closed reduction done after which she was supposed to get MRI done which she never got. She has been walking using crutches, mostly bed bound.  She is not eating well and lost 12 pounds in a month. Pt is not sexually active and has one partner. Patient denies rash, joint pain, cough, sputum production, fevers/chills/nausea/vomiting, diarrhea, abdominal pain (05 Mar 2021 18:58)      # PULMONARY EMBOLISM - PLACED ON LOVENOX, REVIEWED CTA CHEST, LOWER EXTREMITY DOPPLER NEGATIVE, VASCULAR SX CONSULT PLACED, HEME/ONC CONSULT - DR. GAN  - NOTED PATIENT IS STILL BREAST FEEDING; LAST PREGNANCY WAS 2 YEARS AGO  - F/U COVID AB  - DENIES FAMILY HX OF AUTOIMMUNE DISEASE, DENIES HX OF MISCARRIAGES, DENIES HX OF COVID ; HAS BEEN NWB AND SEDENTARY SINCE RECENT ANKLE SPRAIN  # S/P RECENT ANKLE SPRAIN WITH DISLOCATION - ? WAS TOLD TO BE NWB - WILL CONSULT ORTHO TO EVALUATE FURTHER  # HX OF ANEMIA  # HX OF HSV  # GI PPX ; ON THERAPEUTIC LOVENOX [SERVES FOR DVT PREVENTION]    CARLOS MERCHANT MD COVERING BLAS MERCHANT MD Patient is a 25y old  Female who presents with a chief complaint of Chest pain (05 Mar 2021 23:49)    PATIENT IS SEEN AND EXAMINED IN MEDICAL FLOOR.    ALLERGIES:  No Known Allergies    Daily     Daily Weight in k (06 Mar 2021 00:30)    VITALS:    Vital Signs Last 24 Hrs  T(C): 37.2 (06 Mar 2021 11:22), Max: 37.6 (05 Mar 2021 15:05)  T(F): 98.9 (06 Mar 2021 11:22), Max: 99.6 (05 Mar 2021 15:05)  HR: 98 (06 Mar 2021 11:22) (77 - 98)  BP: 99/63 (06 Mar 2021 11:22) (99/63 - 121/79)  BP(mean): --  RR: 17 (06 Mar 2021 11:22) (16 - 18)  SpO2: 97% (06 Mar 2021 11:22) (95% - 99%)    LABS:    CBC Full  -  ( 06 Mar 2021 08:40 )  WBC Count : 5.18 K/uL  RBC Count : 4.37 M/uL  Hemoglobin : 11.6 g/dL  Hematocrit : 37.2 %  Platelet Count - Automated : 369 K/uL  Mean Cell Volume : 85.1 fl  Mean Cell Hemoglobin : 26.5 pg  Mean Cell Hemoglobin Concentration : 31.2 gm/dL  Auto Neutrophil # : 3.47 K/uL  Auto Lymphocyte # : 1.14 K/uL  Auto Monocyte # : 0.45 K/uL  Auto Eosinophil # : 0.06 K/uL  Auto Basophil # : 0.04 K/uL  Auto Neutrophil % : 66.9 %  Auto Lymphocyte % : 22.0 %  Auto Monocyte % : 8.7 %  Auto Eosinophil % : 1.2 %  Auto Basophil % : 0.8 %    PT/INR - ( 05 Mar 2021 19:09 )   PT: 15.8 sec;   INR: 1.35 ratio           -06    141  |  107  |  8   ----------------------------<  94  4.0   |  24  |  0.62    Ca    9.0      06 Mar 2021 08:40  Phos  2.9     03-05  Mg     2.3     03-05    TPro  7.3  /  Alb  3.4<L>  /  TBili  0.8  /  DBili  x   /  AST  8<L>  /  ALT  16  /  AlkPhos  54  03-06    CAPILLARY BLOOD GLUCOSE        CARDIAC MARKERS ( 05 Mar 2021 11:17 )  <0.015 ng/mL / x     / x     / x     / x          LIVER FUNCTIONS - ( 06 Mar 2021 08:40 )  Alb: 3.4 g/dL / Pro: 7.3 g/dL / ALK PHOS: 54 U/L / ALT: 16 U/L DA / AST: 8 U/L / GGT: x           Creatinine Trend: 0.62<--, 0.68<--  I&O's Summary      MEDICATIONS:    MEDICATIONS  (STANDING):  enoxaparin Injectable 70 milliGRAM(s) SubCutaneous every 12 hours  pantoprazole    Tablet 40 milliGRAM(s) Oral before breakfast  sodium chloride 0.9%. 1000 milliLiter(s) (100 mL/Hr) IV Continuous <Continuous>      MEDICATIONS  (PRN):      REVIEW OF SYSTEMS:                           ALL ROS DONE [ X   ]    CONSTITUTIONAL:  LETHARGIC [   ], FEVER [   ], UNRESPONSIVE [   ]  CVS:  CP  [   ], SOB, [   ], PALPITATIONS [   ], DIZZYNESS [   ]  RS: COUGH [   ], SPUTUM [   ]  GI: ABDOMINAL PAIN [   ], NAUSEA [   ], VOMITINGS [   ], DIARRHEA [   ], CONSTIPATION [   ]  :  DYSURIA [   ], NOCTURIA [   ], INCREASED FREQUENCY [   ], DRIBLING [   ],  SKELETAL: PAINFUL JOINTS [   ], SWOLLEN JOINTS [   ], NECK ACHE [   ], LOW BACK ACHE [   ],  SKIN : ULCERS [   ], RASH [   ], ITCHING [   ]  CNS: HEAD ACHE [   ], DOUBLE VISION [   ], BLURRED VISION [   ], AMS / CONFUSION [   ], SEIZURES [   ], WEAKNESS [   ],TINGLING / NUMBNESS [   ]    PHYSICAL EXAMINATION:  GENERAL APPEARANCE: NO DISTRESS  HEENT:  NO PALLOR, NO  JVD,  NO   NODES, NECK SUPPLE  CVS: S1 +, S2 +,   RS: AEEB,  OCCASIONAL  RALES +,   NO RONCHI  ABD: SOFT, NT, NO, BS +  EXT: NO PE  SKIN: WARM,   SKELETAL:  ROM ACCEPTABLE  CNS:  AAO X 3,   DEFICITS    RADIOLOGY :    EXAM:  CT ANGIO CHEST (W)AW IC                            PROCEDURE DATE:  2021          INTERPRETATION:  CLINICAL INFORMATION: Chest pain. Rule out pulmonary embolism.    COMPARISON: None.    CONTRAST/COMPLICATIONS:  IV Contrast: Omnipaque 350 / 45 cc administered / 55 cc discarded.  Oral Contrast: NONE  Complications: None reported at time of study completion    PROCEDURE:  CT Angiography of the Chest.  Sagittal and coronal reformats were performed as well as 3D (MIP) reconstructions.    FINDINGS:    LUNGS AND AIRWAYS: Patent central airways.  Lungs are clear.  PLEURA: No pleural effusion.  MEDIASTINUM AND ARVIND: No lymphadenopathy.  VESSELS: Pulmonary thromboembolism in the distal segment branch and subsegmental branch of the posterior basal segment of right lower lobe and segmental branch of the right middle lobe (5-302, 5-261).  HEART: Heart size is normal. No pericardial effusion. No evidence of right heart strain.  CHEST WALL AND LOWER NECK: Within normal limits.  VISUALIZEDUPPER ABDOMEN: Within normal limits.  BONES: Within normal limits.    IMPRESSION:  Pulmonary thromboembolism in the distal segmental and subsegmental branches of right lower lobe and right middle lobe as described.    =======================================================================================    EXAM:  US DPLX LWR EXT VEINS COMPL BI                            PROCEDURE DATE:  2021          INTERPRETATION:  CLINICAL INFORMATION: Pleuritic chest pain with pulmonary embolism on CTA.    COMPARISON: None available.    TECHNIQUE: Duplex sonography of the BILATERAL LOWER extremity veins with color and spectral Doppler, with and without compression.    FINDINGS:    There is normal compressibility of the bilateral common femoral, femoral and popliteal veins.  Doppler examination shows normal spontaneous and phasic flow.    No calf vein thrombosis is detected.    IMPRESSION:  No evidence of deep venous thrombosis in either lower extremity.    ================================================================    EXAM:  US DPLX LWR EXT ARTS COMPL BI                            PROCEDURE DATE:  2021          INTERPRETATION:  US LOWER EXTREMITY ARTERIAL DUPLEX COMPLETE BILATERAL    CLINICAL INDICATION: Peripheral vascular disease with decrease dorsalis pedis and posterior tibial pulses    COMPARISON: None    Real-time sonography of the bilateral lower extremity arterial system was performed using a high-resolution linear array transducer and including color and spectral Doppler.    There is no measurable plaque in the lower extremity arteries.. No soft tissue abnormalities are demonstrated in either leg. Flow phase patterns and peak systolic velocity measurements (in cm/s) were observed as follows:    RIGHT:  CFA: Triphasic; 124  Proximal SFA: Triphasic; 133  Mid SFA: Triphasic; 144  Distal SFA: Triphasic;  125  Popliteal: Triphasic;  81  Anterior tibial: Triphasic; 80  Posterior tibial: Triphasic; 89  Peroneal: Triphasic;  57  Dorsalis pedis: Triphasic;  44    LEFT:  CFA: Triphasic; 116  Proximal SFA: Triphasic; 126  Mid SFA: Triphasic; 124  Distal SFA: Triphasic; 108  Popliteal: Triphasic;  77  Anterior tibial: Triphasic; 82  Posterior tibial: Triphasic; 68  Peroneal: Triphasic; 56  Dorsalis pedis: Triphasic;  61    IMPRESSION:    No significant stenosis or occlusion in either leg      ASSESSMENT :     Other pulmonary embolism without acute cor pulmonale    Anemia    No pertinent past medical history    No significant past surgical history        PLAN:  HPI:  25y Female with medical history significant of Anemia, herpes presented with chest pain since morning. She states the chest pain is on left side, sharp pain, lasting for few seconds, not aggravated with exercise. It is associated with palpitations and nausea.. She was diagnosed with Herpes and received Acyclovir and IVF on .  She had allergic reaction to acyclovir after 1 st dose and later stopped it. She sprained her ankle in January. She had discloacation, had closed reduction done after which she was supposed to get MRI done which she never got. She has been walking using crutches, mostly bed bound.  She is not eating well and lost 12 pounds in a month. Pt is not sexually active and has one partner. Patient denies rash, joint pain, cough, sputum production, fevers/chills/nausea/vomiting, diarrhea, abdominal pain (05 Mar 2021 18:58)      # PULMONARY EMBOLISM - PLACED ON LOVENOX, REVIEWED CTA CHEST, LOWER EXTREMITY DOPPLER VENOUS/ARTERIAL NEGATIVE, VASCULAR SX CONSULT PLACED, HEME/ONC CONSULT - DR. GAN, PULMONARY CONSULT - DR. BRYSON  - NOTED PATIENT IS STILL BREAST FEEDING; LAST PREGNANCY WAS 2 YEARS AGO  - F/U COVID AB  - DENIES FAMILY HX OF AUTOIMMUNE DISEASE, DENIES HX OF MISCARRIAGES, DENIES HX OF COVID ; HAS BEEN NWB AND SEDENTARY SINCE RECENT ANKLE SPRAIN  # S/P RECENT ANKLE SPRAIN WITH DISLOCATION - ? WAS TOLD TO BE NWB - WILL CONSULT ORTHO TO EVALUATE FURTHER  - PATIENT MAY NEED MRI ANKLE   # HX OF ANEMIA  # HX OF HSV  # GI PPX ; ON THERAPEUTIC LOVENOX [SERVES FOR DVT PREVENTION]    CARLOS MERCHANT MD COVERING BLAS MERCHANT MD Patient is a 25y old  Female who presents with a chief complaint of Chest pain (05 Mar 2021 23:49)    PATIENT IS SEEN AND EXAMINED IN MEDICAL FLOOR.    ALLERGIES:  No Known Allergies    Daily     Daily Weight in k (06 Mar 2021 00:30)    VITALS:    Vital Signs Last 24 Hrs  T(C): 37.2 (06 Mar 2021 11:22), Max: 37.6 (05 Mar 2021 15:05)  T(F): 98.9 (06 Mar 2021 11:22), Max: 99.6 (05 Mar 2021 15:05)  HR: 98 (06 Mar 2021 11:22) (77 - 98)  BP: 99/63 (06 Mar 2021 11:22) (99/63 - 121/79)  BP(mean): --  RR: 17 (06 Mar 2021 11:22) (16 - 18)  SpO2: 97% (06 Mar 2021 11:22) (95% - 99%)    LABS:    CBC Full  -  ( 06 Mar 2021 08:40 )  WBC Count : 5.18 K/uL  RBC Count : 4.37 M/uL  Hemoglobin : 11.6 g/dL  Hematocrit : 37.2 %  Platelet Count - Automated : 369 K/uL  Mean Cell Volume : 85.1 fl  Mean Cell Hemoglobin : 26.5 pg  Mean Cell Hemoglobin Concentration : 31.2 gm/dL  Auto Neutrophil # : 3.47 K/uL  Auto Lymphocyte # : 1.14 K/uL  Auto Monocyte # : 0.45 K/uL  Auto Eosinophil # : 0.06 K/uL  Auto Basophil # : 0.04 K/uL  Auto Neutrophil % : 66.9 %  Auto Lymphocyte % : 22.0 %  Auto Monocyte % : 8.7 %  Auto Eosinophil % : 1.2 %  Auto Basophil % : 0.8 %    PT/INR - ( 05 Mar 2021 19:09 )   PT: 15.8 sec;   INR: 1.35 ratio           -06    141  |  107  |  8   ----------------------------<  94  4.0   |  24  |  0.62    Ca    9.0      06 Mar 2021 08:40  Phos  2.9     03-05  Mg     2.3     03-05    TPro  7.3  /  Alb  3.4<L>  /  TBili  0.8  /  DBili  x   /  AST  8<L>  /  ALT  16  /  AlkPhos  54  03-06    CAPILLARY BLOOD GLUCOSE        CARDIAC MARKERS ( 05 Mar 2021 11:17 )  <0.015 ng/mL / x     / x     / x     / x          LIVER FUNCTIONS - ( 06 Mar 2021 08:40 )  Alb: 3.4 g/dL / Pro: 7.3 g/dL / ALK PHOS: 54 U/L / ALT: 16 U/L DA / AST: 8 U/L / GGT: x           Creatinine Trend: 0.62<--, 0.68<--  I&O's Summary      MEDICATIONS:    MEDICATIONS  (STANDING):  enoxaparin Injectable 70 milliGRAM(s) SubCutaneous every 12 hours  pantoprazole    Tablet 40 milliGRAM(s) Oral before breakfast  sodium chloride 0.9%. 1000 milliLiter(s) (100 mL/Hr) IV Continuous <Continuous>      MEDICATIONS  (PRN):      REVIEW OF SYSTEMS:                           ALL ROS DONE [ X   ]    CONSTITUTIONAL:  LETHARGIC [   ], FEVER [   ], UNRESPONSIVE [   ]  CVS:  CP  [   ], SOB, [   ], PALPITATIONS [   ], DIZZYNESS [   ]  RS: COUGH [   ], SPUTUM [   ]  GI: ABDOMINAL PAIN [   ], NAUSEA [   ], VOMITINGS [   ], DIARRHEA [   ], CONSTIPATION [   ]  :  DYSURIA [   ], NOCTURIA [   ], INCREASED FREQUENCY [   ], DRIBLING [   ],  SKELETAL: PAINFUL JOINTS [   ], SWOLLEN JOINTS [   ], NECK ACHE [   ], LOW BACK ACHE [   ],  SKIN : ULCERS [   ], RASH [   ], ITCHING [   ]  CNS: HEAD ACHE [   ], DOUBLE VISION [   ], BLURRED VISION [   ], AMS / CONFUSION [   ], SEIZURES [   ], WEAKNESS [   ],TINGLING / NUMBNESS [   ]    PHYSICAL EXAMINATION:  GENERAL APPEARANCE: NO DISTRESS  HEENT:  NO PALLOR, NO  JVD,  NO   NODES, NECK SUPPLE  CVS: S1 +, S2 +,   RS: AEEB,  OCCASIONAL  RALES +,   NO RONCHI  ABD: SOFT, NT, NO, BS +  EXT: NO PE ,    RIGHT ANKLE WITH REDUCED ROM, MILDLY TENDER TO PALPATION, SWOLLEN  SKIN: WARM,   SKELETAL:  ROM ACCEPTABLE  CNS:  AAO X 3    RADIOLOGY :    EXAM:  CT ANGIO CHEST (W)AW IC                            PROCEDURE DATE:  2021          INTERPRETATION:  CLINICAL INFORMATION: Chest pain. Rule out pulmonary embolism.    COMPARISON: None.    CONTRAST/COMPLICATIONS:  IV Contrast: Omnipaque 350 / 45 cc administered / 55 cc discarded.  Oral Contrast: NONE  Complications: None reported at time of study completion    PROCEDURE:  CT Angiography of the Chest.  Sagittal and coronal reformats were performed as well as 3D (MIP) reconstructions.    FINDINGS:    LUNGS AND AIRWAYS: Patent central airways.  Lungs are clear.  PLEURA: No pleural effusion.  MEDIASTINUM AND ARVIND: No lymphadenopathy.  VESSELS: Pulmonary thromboembolism in the distal segment branch and subsegmental branch of the posterior basal segment of right lower lobe and segmental branch of the right middle lobe (5-302, 5-261).  HEART: Heart size is normal. No pericardial effusion. No evidence of right heart strain.  CHEST WALL AND LOWER NECK: Within normal limits.  VISUALIZEDUPPER ABDOMEN: Within normal limits.  BONES: Within normal limits.    IMPRESSION:  Pulmonary thromboembolism in the distal segmental and subsegmental branches of right lower lobe and right middle lobe as described.    =======================================================================================    EXAM:  US DPLX LWR EXT VEINS COMPL BI                            PROCEDURE DATE:  2021          INTERPRETATION:  CLINICAL INFORMATION: Pleuritic chest pain with pulmonary embolism on CTA.    COMPARISON: None available.    TECHNIQUE: Duplex sonography of the BILATERAL LOWER extremity veins with color and spectral Doppler, with and without compression.    FINDINGS:    There is normal compressibility of the bilateral common femoral, femoral and popliteal veins.  Doppler examination shows normal spontaneous and phasic flow.    No calf vein thrombosis is detected.    IMPRESSION:  No evidence of deep venous thrombosis in either lower extremity.    ================================================================    EXAM:  US DPLX LWR EXT ARTS COMPL BI                            PROCEDURE DATE:  2021          INTERPRETATION:  US LOWER EXTREMITY ARTERIAL DUPLEX COMPLETE BILATERAL    CLINICAL INDICATION: Peripheral vascular disease with decrease dorsalis pedis and posterior tibial pulses    COMPARISON: None    Real-time sonography of the bilateral lower extremity arterial system was performed using a high-resolution linear array transducer and including color and spectral Doppler.    There is no measurable plaque in the lower extremity arteries.. No soft tissue abnormalities are demonstrated in either leg. Flow phase patterns and peak systolic velocity measurements (in cm/s) were observed as follows:    RIGHT:  CFA: Triphasic; 124  Proximal SFA: Triphasic; 133  Mid SFA: Triphasic; 144  Distal SFA: Triphasic;  125  Popliteal: Triphasic;  81  Anterior tibial: Triphasic; 80  Posterior tibial: Triphasic; 89  Peroneal: Triphasic;  57  Dorsalis pedis: Triphasic;  44    LEFT:  CFA: Triphasic; 116  Proximal SFA: Triphasic; 126  Mid SFA: Triphasic; 124  Distal SFA: Triphasic; 108  Popliteal: Triphasic;  77  Anterior tibial: Triphasic; 82  Posterior tibial: Triphasic; 68  Peroneal: Triphasic; 56  Dorsalis pedis: Triphasic;  61    IMPRESSION:    No significant stenosis or occlusion in either leg      ASSESSMENT :     Other pulmonary embolism without acute cor pulmonale    Anemia    No pertinent past medical history    No significant past surgical history        PLAN:  HPI:  25y Female with medical history significant of Anemia, herpes presented with chest pain since morning. She states the chest pain is on left side, sharp pain, lasting for few seconds, not aggravated with exercise. It is associated with palpitations and nausea.. She was diagnosed with Herpes and received Acyclovir and IVF on .  She had allergic reaction to acyclovir after 1 st dose and later stopped it. She sprained her ankle in January. She had discloacation, had closed reduction done after which she was supposed to get MRI done which she never got. She has been walking using crutches, mostly bed bound.  She is not eating well and lost 12 pounds in a month. Pt is not sexually active and has one partner. Patient denies rash, joint pain, cough, sputum production, fevers/chills/nausea/vomiting, diarrhea, abdominal pain (05 Mar 2021 18:58)      # PULMONARY EMBOLISM - PLACED ON LOVENOX, REVIEWED CTA CHEST, LOWER EXTREMITY DOPPLER VENOUS/ARTERIAL NEGATIVE, VASCULAR SX CONSULT PLACED, HEME/ONC CONSULT - DR. GAN, PULMONARY CONSULT - DR. BRYSON  - NOTED PATIENT IS STILL BREAST FEEDING; LAST PREGNANCY WAS 2 YEARS AGO  - F/U COVID AB  - DENIES FAMILY HX OF AUTOIMMUNE DISEASE, DENIES HX OF MISCARRIAGES, DENIES HX OF COVID ; HAS BEEN NWB AND SEDENTARY SINCE RECENT ANKLE SPRAIN  # S/P RECENT ANKLE SPRAIN WITH DISLOCATION - ? WAS TOLD TO BE NWB - WILL CONSULT ORTHO TO EVALUATE FURTHER  - PATIENT MAY NEED MRI ANKLE VS. CT ANKLE   # HX OF ANEMIA  # HX OF HSV  # GI PPX ; ON THERAPEUTIC LOVENOX [SERVES FOR DVT PREVENTION]    CARLOS MERCHANT MD COVERING BLAS MERCHANT MD

## 2021-03-06 NOTE — PROGRESS NOTE ADULT - SUBJECTIVE AND OBJECTIVE BOX
Patient is a 25y old  Female who presents with a chief complaint of Chest pain (06 Mar 2021 13:27)      Vascular Surgery Attending Progress Note    Interval HPI: pt w/o new c/o     Medications:  enoxaparin Injectable 70 milliGRAM(s) SubCutaneous every 12 hours  FIRST- Mouthwash  BLM 10 milliLiter(s) Swish and Spit three times a day  pantoprazole    Tablet 40 milliGRAM(s) Oral before breakfast  sodium chloride 0.9%. 1000 milliLiter(s) IV Continuous <Continuous>      Vital Signs Last 24 Hrs  T(C): 37.1 (06 Mar 2021 16:04), Max: 37.5 (05 Mar 2021 19:54)  T(F): 98.8 (06 Mar 2021 16:04), Max: 99.5 (05 Mar 2021 19:54)  HR: 86 (06 Mar 2021 16:04) (77 - 98)  BP: 101/67 (06 Mar 2021 16:04) (99/63 - 110/69)  BP(mean): --  RR: 18 (06 Mar 2021 16:04) (16 - 18)  SpO2: 97% (06 Mar 2021 16:04) (95% - 99%)  I&O's Summary    06 Mar 2021 07:01  -  06 Mar 2021 19:36  --------------------------------------------------------  IN: 236 mL / OUT: 0 mL / NET: 236 mL        Physical Exam:  Neuro  A&Ox3 VSS  Vascular:  no acute changes   no wounds   Pulses  femoral   rt    +2       lt   +2                DPA          rt   +2        lt  +2                PTA          rt   +2        lt  +2     LABS:                        11.6   5.18  )-----------( 369      ( 06 Mar 2021 08:40 )             37.2     03-06    141  |  107  |  8   ----------------------------<  94  4.0   |  24  |  0.62    Ca    9.0      06 Mar 2021 08:40  Phos  2.9     03-05  Mg     2.3     03-05    TPro  7.3  /  Alb  3.4<L>  /  TBili  0.8  /  DBili  x   /  AST  8<L>  /  ALT  16  /  AlkPhos  54  03-06    PT/INR - ( 05 Mar 2021 19:09 )   PT: 15.8 sec;   INR: 1.35 ratio         Festus Le Arterial duplex reviewed no sig art insuff     SONIA WHYTE MD  [id number] Cell [phone]

## 2021-03-06 NOTE — CONSULT NOTE ADULT - SUBJECTIVE AND OBJECTIVE BOX
Time of visit:    CHIEF COMPLAINT: Patient is a 25y old  Female who presents with a chief complaint of Chest pain (06 Mar 2021 19:36)      HPI:  25y Female with medical history significant of Anemia, herpes presented with chest pain since morning. She states the chest pain is on left side, sharp pain, lasting for few seconds, not aggravated with exercise. It is associated with palpitations and nausea.. She was diagnosed with Herpes and received Acyclovir and IVF on Feb 23rd.  She had allergic reaction to acyclovir after 1 st dose and later stopped it. She sprained her ankle in January. She had discloacation, had closed reduction done after which she was supposed to get MRI done which she never got. She has been walking using crutches, mostly bed bound.  She is not eating well and lost 12 pounds in a month. Pt is not sexually active and has one partner. Patient denies rash, joint pain, cough, sputum production, fevers/chills/nausea/vomiting, diarrhea, abdominal pain (05 Mar 2021 18:58)   Patient seen and examined.     PAST MEDICAL & SURGICAL HISTORY:  Anemia    No pertinent past medical history    No significant past surgical history        Allergies    No Known Allergies    Intolerances        MEDICATIONS  (STANDING):  acetaminophen   Tablet .. 650 milliGRAM(s) Oral once  enoxaparin Injectable 70 milliGRAM(s) SubCutaneous every 12 hours  FIRST- Mouthwash  BLM 10 milliLiter(s) Swish and Spit three times a day  pantoprazole    Tablet 40 milliGRAM(s) Oral before breakfast  sodium chloride 0.9%. 1000 milliLiter(s) (100 mL/Hr) IV Continuous <Continuous>      MEDICATIONS  (PRN):   Medications up to date at time of exam.    Medications up to date at time of exam.    FAMILY HISTORY:      SOCIAL HISTORY  Smoking History: [   ] smoking/smoke exposure, [   ] former smoker  Living Condition: [   ] apartment, [   ] private house  Work History:   Travel History: denies recent travel  Illicit Substance Use: denies  Alcohol Use: denies    REVIEW OF SYSTEMS:    CONSTITUTIONAL:  denies fevers, chills, sweats, weight loss    HEENT:  denies diplopia or blurred vision, sore throat or runny nose.    CARDIOVASCULAR:  denies pressure, squeezing, tightness, or heaviness about the chest; no palpitations.    RESPIRATORY:  denies SOB, cough, MONTERO, wheezing.    GASTROINTESTINAL:  denies abdominal pain, nausea, vomiting or diarrhea.    GENITOURINARY: denies dysuria, frequency or urgency.    NEUROLOGIC:  denies numbness, tingling, seizures or weakness.    PSYCHIATRIC:  denies disorder of thought or mood.    MSK: denies swelling, redness      PHYSICAL EXAMINATION:    GENERAL: The patient is a well-developed, well-nourished, in no apparent distress.     Vital Signs Last 24 Hrs  T(C): 37.3 (06 Mar 2021 20:03), Max: 37.3 (06 Mar 2021 20:03)  T(F): 99.1 (06 Mar 2021 20:03), Max: 99.1 (06 Mar 2021 20:03)  HR: 87 (06 Mar 2021 20:54) (77 - 98)  BP: 99/60 (06 Mar 2021 20:54) (99/60 - 105/60)  BP(mean): --  RR: 18 (06 Mar 2021 20:03) (16 - 18)  SpO2: 96% (06 Mar 2021 20:03) (95% - 98%)   (if applicable)    Chest Tube (if applicable)    HEENT: Head is normocephalic and atraumatic. Extraocular muscles are intact. Mucous membranes are moist.     NECK: Supple, no palpable adenopathy.    LUNGS: Clear to auscultation, no wheezing, rales, or rhonchi.    HEART: Regular rate and rhythm without murmur.    ABDOMEN: Soft, nontender, and nondistended.  No hepatosplenomegaly is noted.    RENAL: No difficulty voiding, no pelvic pain    EXTREMITIES: Without any cyanosis, clubbing, rash, lesions or edema.    NEUROLOGIC: Awake, alert, oriented, grossly intact    SKIN: Warm, dry, good turgor.      LABS:                        11.6   5.18  )-----------( 369      ( 06 Mar 2021 08:40 )             37.2     03-06    141  |  107  |  8   ----------------------------<  94  4.0   |  24  |  0.62    Ca    9.0      06 Mar 2021 08:40  Phos  2.9     03-05  Mg     2.3     03-05    TPro  7.3  /  Alb  3.4<L>  /  TBili  0.8  /  DBili  x   /  AST  8<L>  /  ALT  16  /  AlkPhos  54  03-06    PT/INR - ( 05 Mar 2021 19:09 )   PT: 15.8 sec;   INR: 1.35 ratio               CARDIAC MARKERS ( 05 Mar 2021 11:17 )  <0.015 ng/mL / x     / x     / x     / x                    MICROBIOLOGY: (if applicable)    RADIOLOGY & ADDITIONAL STUDIES:  EKG:   CXR:  ECHO:    IMPRESSION: 25y Female PAST MEDICAL & SURGICAL HISTORY:  Anemia    No pertinent past medical history    No significant past surgical history     p/w                   RECOMMENDATIONS:   Time of visit:    CHIEF COMPLAINT: Patient is a 25y old  Female who presents with a chief complaint of Chest pain (06 Mar 2021 19:36)      HPI:  25y Female with medical history significant of Anemia, herpes presented with chest pain since morning. She states the chest pain is on left side, sharp pain, lasting for few seconds, not aggravated with exercise. It is associated with palpitations and nausea.. She was diagnosed with Herpes and received Acyclovir and IVF on Feb 23rd.  She had allergic reaction to acyclovir after 1 st dose and later stopped it. She sprained her ankle in January. She had discloacation, had closed reduction done after which she was supposed to get MRI done which she never got. She has been walking using crutches, mostly bed bound.  She is not eating well and lost 12 pounds in a month. Pt is not sexually active and has one partner. Patient denies rash, joint pain, cough, sputum production, fevers/chills/nausea/vomiting, diarrhea, abdominal pain (05 Mar 2021 18:58)   Patient seen and examined.     PAST MEDICAL & SURGICAL HISTORY:  Anemia    No pertinent past medical history    No significant past surgical history        Allergies    No Known Allergies    Intolerances        MEDICATIONS  (STANDING):  acetaminophen   Tablet .. 650 milliGRAM(s) Oral once  enoxaparin Injectable 70 milliGRAM(s) SubCutaneous every 12 hours  FIRST- Mouthwash  BLM 10 milliLiter(s) Swish and Spit three times a day  pantoprazole    Tablet 40 milliGRAM(s) Oral before breakfast  sodium chloride 0.9%. 1000 milliLiter(s) (100 mL/Hr) IV Continuous <Continuous>      MEDICATIONS  (PRN):   Medications up to date at time of exam.    Medications up to date at time of exam.    FAMILY HISTORY:      SOCIAL HISTORY  Smoking History: [   ] smoking/smoke exposure, [   ] former smoker  Living Condition: [   ] apartment, [   ] private house  Work History:   Travel History: denies recent travel  Illicit Substance Use: denies  Alcohol Use: denies    REVIEW OF SYSTEMS:    CONSTITUTIONAL:  denies fevers, chills, sweats, weight loss    HEENT:  denies diplopia or blurred vision, sore throat or runny nose.    CARDIOVASCULAR:  denies pressure, squeezing, tightness, or heaviness about the chest; no palpitations.    RESPIRATORY:  denies SOB, cough, MONTERO, wheezing.    GASTROINTESTINAL:  denies abdominal pain, nausea, vomiting or diarrhea.    GENITOURINARY: denies dysuria, frequency or urgency.    NEUROLOGIC:  denies numbness, tingling, seizures or weakness.    PSYCHIATRIC:  denies disorder of thought or mood.    MSK: denies swelling, redness      PHYSICAL EXAMINATION:    GENERAL: The patient is a well-developed, well-nourished, in no apparent distress.     Vital Signs Last 24 Hrs  T(C): 37.3 (06 Mar 2021 20:03), Max: 37.3 (06 Mar 2021 20:03)  T(F): 99.1 (06 Mar 2021 20:03), Max: 99.1 (06 Mar 2021 20:03)  HR: 87 (06 Mar 2021 20:54) (77 - 98)  BP: 99/60 (06 Mar 2021 20:54) (99/60 - 105/60)  BP(mean): --  RR: 18 (06 Mar 2021 20:03) (16 - 18)  SpO2: 96% (06 Mar 2021 20:03) (95% - 98%)   (if applicable)    Chest Tube (if applicable)    HEENT: Head is normocephalic and atraumatic. Extraocular muscles are intact. Mucous membranes are moist.  + vesicles on lips     NECK: Supple, no palpable adenopathy.    LUNGS: Clear to auscultation, no wheezing, rales, or rhonchi.    HEART: Regular rate and rhythm without murmur.    ABDOMEN: Soft, nontender, and nondistended.  No hepatosplenomegaly is noted.    RENAL: No difficulty voiding, no pelvic pain    EXTREMITIES: Without any cyanosis, clubbing, rash, lesions or edema.    NEUROLOGIC: Awake, alert, oriented, grossly intact    SKIN: Warm, dry, good turgor.      LABS:                        11.6   5.18  )-----------( 369      ( 06 Mar 2021 08:40 )             37.2     03-06    141  |  107  |  8   ----------------------------<  94  4.0   |  24  |  0.62    Ca    9.0      06 Mar 2021 08:40  Phos  2.9     03-05  Mg     2.3     03-05    TPro  7.3  /  Alb  3.4<L>  /  TBili  0.8  /  DBili  x   /  AST  8<L>  /  ALT  16  /  AlkPhos  54  03-06    PT/INR - ( 05 Mar 2021 19:09 )   PT: 15.8 sec;   INR: 1.35 ratio               CARDIAC MARKERS ( 05 Mar 2021 11:17 )  <0.015 ng/mL / x     / x     / x     / x                    MICROBIOLOGY: (if applicable)    RADIOLOGY & ADDITIONAL STUDIES:  EKG:   CTPA:< from: CT Angio Chest w/ IV Cont (03.05.21 @ 15:39) >    EXAM:  CT ANGIO CHEST (W)AW IC                            PROCEDURE DATE:  03/05/2021          INTERPRETATION:  CLINICAL INFORMATION: Chest pain. Rule out pulmonary embolism.    COMPARISON: None.    CONTRAST/COMPLICATIONS:  IV Contrast: Omnipaque 350 / 45 cc administered / 55 cc discarded.  Oral Contrast: NONE  Complications: None reported at time of study completion    PROCEDURE:  CT Angiography of the Chest.  Sagittal and coronal reformats were performed as well as 3D (MIP) reconstructions.    FINDINGS:    LUNGS AND AIRWAYS: Patent central airways.  Lungs are clear.  PLEURA: No pleural effusion.  MEDIASTINUM AND ARVIND: No lymphadenopathy.  VESSELS: Pulmonary thromboembolism in the distal segment branch and subsegmental branch of the posterior basal segment of right lower lobe and segmental branch of the right middle lobe (5-302, 5-261).  HEART: Heart size is normal. No pericardial effusion. No evidence of right heart strain.  CHEST WALL AND LOWER NECK: Within normal limits.  VISUALIZEDUPPER ABDOMEN: Within normal limits.  BONES: Within normal limits.    IMPRESSION:  Pulmonary thromboembolism in the distal segmental and subsegmental branches of right lower lobe and right middle lobe as described.    Findings were discussed by Dr. Alicia with Dr. Waterman with read back at 4:03 PM.            TITA ALICIA MD; Attending Radiologist  This document has been electronically signed. Mar  5 2021  4:07PM    < end of copied text >    ECHO: < from: US Duplex Venous Lower Ext Complete, Bilateral (03.05.21 @ 22:06) >    EXAM:  US DPLX LWR EXT VEINS COMPL BI                            PROCEDURE DATE:  03/05/2021          INTERPRETATION:  CLINICAL INFORMATION: Pleuritic chest pain with pulmonary embolism on CTA.    COMPARISON: None available.    TECHNIQUE: Duplex sonography of the BILATERAL LOWER extremity veins with color and spectral Doppler, with and without compression.    FINDINGS:    There is normal compressibility of the bilateral common femoral, femoral and popliteal veins.  Doppler examination shows normal spontaneous and phasic flow.    No calf vein thrombosis is detected.    IMPRESSION:  No evidence of deep venous thrombosis in either lower extremity.                DEBBIE BRYAN DO; Attending Radiologist  This document has been electronically signed. Mar  5 2021 10:13PM    < end of copied text >      IMPRESSION: 25y Female PAST MEDICAL & SURGICAL HISTORY:  Anemia    No pertinent past medical history    No significant past surgical history     p/w         IMP: This is a 25 yr old woman , non smoker with anemia admitted for chest pain due to PE secondary to immobility due to ankle pain ..  Pat is not hypoxic . + oral herpes      Sugg;  -consider DOAC   -anticoag for 3 months   -zyvorax for oral herpes   -consider HIV testing

## 2021-03-06 NOTE — CONSULT NOTE ADULT - ASSESSMENT
25 year old lady with ?herpes in mouth and sprained ankle using crutches developed chest pain and palpitation.  CTA was positive for PE.    1. PE secondary to immobility  on lovenox.   can switch to DOAC  I do not think we need to do extensive thrombophilia w/u  but can check leiden mutation and prothrombin mutation    2. oral ulcers  ?herpes  ?pemphigus vulgaris or paraneoplastics  also R/O autoimmune disease

## 2021-03-06 NOTE — CONSULT NOTE ADULT - SUBJECTIVE AND OBJECTIVE BOX
Patient is a 25y old  Female who presents with a chief complaint of Chest pain (06 Mar 2021 21:00)      HPI:  25y Female with medical history significant of Anemia, herpes presented with chest pain since morning. She states the chest pain is on left side, sharp pain, lasting for few seconds, not aggravated with exercise. It is associated with palpitations and nausea.. She was diagnosed with Herpes and received Acyclovir and IVF on Feb 23rd.  She had allergic reaction to acyclovir after 1 st dose and later stopped it. She sprained her ankle in January. She had discloacation, had closed reduction done after which she was supposed to get MRI done which she never got. She has been walking using crutches, mostly bed bound.  She is not eating well and lost 12 pounds in a month. Pt is not sexually active and has one partner. Patient denies rash, joint pain, cough, sputum production, fevers/chills/nausea/vomiting, diarrhea, abdominal pain (05 Mar 2021 18:58)       ROS:  Negative except for:    PAST MEDICAL & SURGICAL HISTORY:  Anemia    No pertinent past medical history    No significant past surgical history        SOCIAL HISTORY:    FAMILY HISTORY:      MEDICATIONS  (STANDING):  enoxaparin Injectable 70 milliGRAM(s) SubCutaneous every 12 hours  FIRST- Mouthwash  BLM 10 milliLiter(s) Swish and Spit three times a day  pantoprazole    Tablet 40 milliGRAM(s) Oral before breakfast  sodium chloride 0.9%. 1000 milliLiter(s) (100 mL/Hr) IV Continuous <Continuous>    MEDICATIONS  (PRN):      Allergies    No Known Allergies    Intolerances        Vital Signs Last 24 Hrs  T(C): 37.3 (06 Mar 2021 20:03), Max: 37.3 (06 Mar 2021 20:03)  T(F): 99.1 (06 Mar 2021 20:03), Max: 99.1 (06 Mar 2021 20:03)  HR: 87 (06 Mar 2021 20:54) (77 - 98)  BP: 99/60 (06 Mar 2021 20:54) (99/60 - 105/60)  BP(mean): --  RR: 18 (06 Mar 2021 20:03) (16 - 18)  SpO2: 96% (06 Mar 2021 20:03) (95% - 98%)    PHYSICAL EXAM  General: adult in NAD  HEENT: clear oropharynx, anicteric sclera, pink conjunctiva  Neck: supple  CV: normal S1/S2 with no murmur rubs or gallops  Lungs: positive air movement b/l ant lungs,clear to auscultation, no wheezes, no rales  Abdomen: soft non-tender non-distended, no hepatosplenomegaly  Ext: no clubbing cyanosis or edema  Skin: no rashes and no petechiae  Neuro: alert and oriented X 4, no focal deficits      LABS:                          11.6   5.18  )-----------( 369      ( 06 Mar 2021 08:40 )             37.2         Mean Cell Volume : 85.1 fl  Mean Cell Hemoglobin : 26.5 pg  Mean Cell Hemoglobin Concentration : 31.2 gm/dL  Auto Neutrophil # : 3.47 K/uL  Auto Lymphocyte # : 1.14 K/uL  Auto Monocyte # : 0.45 K/uL  Auto Eosinophil # : 0.06 K/uL  Auto Basophil # : 0.04 K/uL  Auto Neutrophil % : 66.9 %  Auto Lymphocyte % : 22.0 %  Auto Monocyte % : 8.7 %  Auto Eosinophil % : 1.2 %  Auto Basophil % : 0.8 %      Serial CBC's  03-06 @ 08:40  Hct-37.2 / Hgb-11.6 / Plat-369 / RBC-4.37 / WBC-5.18  < from: CT Angio Chest w/ IV Cont (03.05.21 @ 15:39) >  PROCEDURE:  CT Angiography of the Chest.  Sagittal and coronal reformats were performed as well as 3D (MIP) reconstructions.    FINDINGS:    LUNGS AND AIRWAYS: Patent central airways.  Lungs are clear.  PLEURA: No pleural effusion.  MEDIASTINUM AND ARVIND: No lymphadenopathy.  VESSELS: Pulmonary thromboembolism in the distal segment branch and subsegmental branch of the posterior basal segment of right lower lobe and segmental branch of the right middle lobe (5-302, 5-261).  HEART: Heart size is normal. No pericardial effusion. No evidence of right heart strain.  CHEST WALL AND LOWER NECK: Within normal limits.  VISUALIZEDUPPER ABDOMEN: Within normal limits.  BONES: Within normal limits.    IMPRESSION:  Pulmonary thromboembolism in the distal segmental and subsegmental branches of right lower lobe and right middle lobe as described.      < end of copied text >  Serial CBC's  03-05 @ 11:17  Hct-39.2 / Hgb-12.3 / Plat-369 / RBC-4.59 / WBC-7.88      03-06    141  |  107  |  8   ----------------------------<  94  4.0   |  24  |  0.62    Ca    9.0      06 Mar 2021 08:40  Phos  2.9     03-05  Mg     2.3     03-05    TPro  7.3  /  Alb  3.4<L>  /  TBili  0.8  /  DBili  x   /  AST  8<L>  /  ALT  16  /  AlkPhos  54  03-06      PT/INR - ( 05 Mar 2021 19:09 )   PT: 15.8 sec;   INR: 1.35 ratio             Folate, Serum: 15.9 ng/mL (03-06 @ 02:41)  Vitamin B12, Serum: 812 pg/mL (03-06 @ 02:41)              BLOOD SMEAR INTERPRETATION:       RADIOLOGY & ADDITIONAL STUDIES:    < from: CT Angio Chest w/ IV Cont (03.05.21 @ 15:39) >  PROCEDURE:  CT Angiography of the Chest.  Sagittal and coronal reformats were performed as well as 3D (MIP) reconstructions.    FINDINGS:    LUNGS AND AIRWAYS: Patent central airways.  Lungs are clear.  PLEURA: No pleural effusion.  MEDIASTINUM AND ARVIND: No lymphadenopathy.  VESSELS: Pulmonary thromboembolism in the distal segment branch and subsegmental branch of the posterior basal segment of right lower lobe and segmental branch of the right middle lobe (5-302, 5-261).  HEART: Heart size is normal. No pericardial effusion. No evidence of right heart strain.  CHEST WALL AND LOWER NECK: Within normal limits.  VISUALIZEDUPPER ABDOMEN: Within normal limits.  BONES: Within normal limits.    IMPRESSION:  Pulmonary thromboembolism in the distal segmental and subsegmental branches of right lower lobe and right middle lobe as described.    < end of copied text >

## 2021-03-07 LAB
24R-OH-CALCIDIOL SERPL-MCNC: 11.4 NG/ML — LOW (ref 30–80)
ALBUMIN SERPL ELPH-MCNC: 3.3 G/DL — LOW (ref 3.5–5)
ALP SERPL-CCNC: 54 U/L — SIGNIFICANT CHANGE UP (ref 40–120)
ALT FLD-CCNC: 15 U/L DA — SIGNIFICANT CHANGE UP (ref 10–60)
ANION GAP SERPL CALC-SCNC: 10 MMOL/L — SIGNIFICANT CHANGE UP (ref 5–17)
AST SERPL-CCNC: 5 U/L — LOW (ref 10–40)
BASOPHILS # BLD AUTO: 0.03 K/UL — SIGNIFICANT CHANGE UP (ref 0–0.2)
BASOPHILS NFR BLD AUTO: 0.5 % — SIGNIFICANT CHANGE UP (ref 0–2)
BILIRUB SERPL-MCNC: 0.8 MG/DL — SIGNIFICANT CHANGE UP (ref 0.2–1.2)
BUN SERPL-MCNC: 6 MG/DL — LOW (ref 7–18)
CALCIUM SERPL-MCNC: 9.1 MG/DL — SIGNIFICANT CHANGE UP (ref 8.4–10.5)
CHLORIDE SERPL-SCNC: 106 MMOL/L — SIGNIFICANT CHANGE UP (ref 96–108)
CO2 SERPL-SCNC: 24 MMOL/L — SIGNIFICANT CHANGE UP (ref 22–31)
CREAT SERPL-MCNC: 0.6 MG/DL — SIGNIFICANT CHANGE UP (ref 0.5–1.3)
CRP SERPL-MCNC: <3 MG/L — SIGNIFICANT CHANGE UP
EOSINOPHIL # BLD AUTO: 0.08 K/UL — SIGNIFICANT CHANGE UP (ref 0–0.5)
EOSINOPHIL NFR BLD AUTO: 1.3 % — SIGNIFICANT CHANGE UP (ref 0–6)
ERYTHROCYTE [SEDIMENTATION RATE] IN BLOOD: 16 MM/HR — HIGH (ref 0–15)
GLUCOSE SERPL-MCNC: 73 MG/DL — SIGNIFICANT CHANGE UP (ref 70–99)
HCT VFR BLD CALC: 35.5 % — SIGNIFICANT CHANGE UP (ref 34.5–45)
HGB BLD-MCNC: 11.2 G/DL — LOW (ref 11.5–15.5)
IMM GRANULOCYTES NFR BLD AUTO: 0.3 % — SIGNIFICANT CHANGE UP (ref 0–1.5)
LYMPHOCYTES # BLD AUTO: 1.27 K/UL — SIGNIFICANT CHANGE UP (ref 1–3.3)
LYMPHOCYTES # BLD AUTO: 20.5 % — SIGNIFICANT CHANGE UP (ref 13–44)
MAGNESIUM SERPL-MCNC: 2.1 MG/DL — SIGNIFICANT CHANGE UP (ref 1.6–2.6)
MCHC RBC-ENTMCNC: 27 PG — SIGNIFICANT CHANGE UP (ref 27–34)
MCHC RBC-ENTMCNC: 31.5 GM/DL — LOW (ref 32–36)
MCV RBC AUTO: 85.5 FL — SIGNIFICANT CHANGE UP (ref 80–100)
MONOCYTES # BLD AUTO: 0.49 K/UL — SIGNIFICANT CHANGE UP (ref 0–0.9)
MONOCYTES NFR BLD AUTO: 7.9 % — SIGNIFICANT CHANGE UP (ref 2–14)
NEUTROPHILS # BLD AUTO: 4.31 K/UL — SIGNIFICANT CHANGE UP (ref 1.8–7.4)
NEUTROPHILS NFR BLD AUTO: 69.5 % — SIGNIFICANT CHANGE UP (ref 43–77)
NRBC # BLD: 0 /100 WBCS — SIGNIFICANT CHANGE UP (ref 0–0)
PHOSPHATE SERPL-MCNC: 3.2 MG/DL — SIGNIFICANT CHANGE UP (ref 2.5–4.5)
PLATELET # BLD AUTO: 341 K/UL — SIGNIFICANT CHANGE UP (ref 150–400)
POTASSIUM SERPL-MCNC: 3.5 MMOL/L — SIGNIFICANT CHANGE UP (ref 3.5–5.3)
POTASSIUM SERPL-SCNC: 3.5 MMOL/L — SIGNIFICANT CHANGE UP (ref 3.5–5.3)
PROT SERPL-MCNC: 7 G/DL — SIGNIFICANT CHANGE UP (ref 6–8.3)
RBC # BLD: 4.15 M/UL — SIGNIFICANT CHANGE UP (ref 3.8–5.2)
RBC # FLD: 14.6 % — HIGH (ref 10.3–14.5)
SODIUM SERPL-SCNC: 140 MMOL/L — SIGNIFICANT CHANGE UP (ref 135–145)
WBC # BLD: 6.2 K/UL — SIGNIFICANT CHANGE UP (ref 3.8–10.5)
WBC # FLD AUTO: 6.2 K/UL — SIGNIFICANT CHANGE UP (ref 3.8–10.5)

## 2021-03-07 RX ORDER — OXYMETAZOLINE HYDROCHLORIDE 0.5 MG/ML
1 SPRAY NASAL
Refills: 0 | Status: DISCONTINUED | OUTPATIENT
Start: 2021-03-07 | End: 2021-03-07

## 2021-03-07 RX ORDER — APIXABAN 2.5 MG/1
2 TABLET, FILM COATED ORAL
Qty: 74 | Refills: 0
Start: 2021-03-07 | End: 2021-03-13

## 2021-03-07 RX ORDER — APIXABAN 2.5 MG/1
10 TABLET, FILM COATED ORAL EVERY 12 HOURS
Refills: 0 | Status: DISCONTINUED | OUTPATIENT
Start: 2021-03-08 | End: 2021-03-09

## 2021-03-07 RX ORDER — ERGOCALCIFEROL 1.25 MG/1
50000 CAPSULE ORAL
Refills: 0 | Status: DISCONTINUED | OUTPATIENT
Start: 2021-03-07 | End: 2021-03-10

## 2021-03-07 RX ORDER — APIXABAN 2.5 MG/1
2 TABLET, FILM COATED ORAL
Qty: 28 | Refills: 0
Start: 2021-03-07 | End: 2021-03-13

## 2021-03-07 RX ORDER — APIXABAN 2.5 MG/1
1 TABLET, FILM COATED ORAL
Qty: 180 | Refills: 0
Start: 2021-03-07 | End: 2021-06-04

## 2021-03-07 RX ADMIN — SODIUM CHLORIDE 100 MILLILITER(S): 9 INJECTION INTRAMUSCULAR; INTRAVENOUS; SUBCUTANEOUS at 12:02

## 2021-03-07 RX ADMIN — ENOXAPARIN SODIUM 70 MILLIGRAM(S): 100 INJECTION SUBCUTANEOUS at 05:44

## 2021-03-07 RX ADMIN — DIPHENHYDRAMINE HYDROCHLORIDE AND LIDOCAINE HYDROCHLORIDE AND ALUMINUM HYDROXIDE AND MAGNESIUM HYDRO 10 MILLILITER(S): KIT at 05:44

## 2021-03-07 RX ADMIN — DIPHENHYDRAMINE HYDROCHLORIDE AND LIDOCAINE HYDROCHLORIDE AND ALUMINUM HYDROXIDE AND MAGNESIUM HYDRO 10 MILLILITER(S): KIT at 14:14

## 2021-03-07 RX ADMIN — PANTOPRAZOLE SODIUM 40 MILLIGRAM(S): 20 TABLET, DELAYED RELEASE ORAL at 05:54

## 2021-03-07 NOTE — PROGRESS NOTE ADULT - SUBJECTIVE AND OBJECTIVE BOX
PGY-1 Progress Note discussed with attending    PAGER #: [[phone]] TILL 5:00 PM  PLEASE CONTACT ON CALL TEAM:  - On Call Team (Please refer to Colleen) FROM 5:00 PM - 8:30PM  - Nightfloat Team FROM 8:30 -7:30 AM    CHIEF COMPLAINT & BRIEF HOSPITAL COURSE: 26 y/o Female with medical history significant of Anemia, herpes presented with chest pain since the morning of admission. She states that she had L sided chest pain, sharp pain, lasting for few seconds, not aggravated with exercise. It is associated with palpitations and nausea.. She was diagnosed with Herpes and received Acyclovir and IVF on Feb 23rd.  She had allergic reaction to acyclovir after 1 st dose and later stopped it. She sprained her ankle in January. She had discloacation, had closed reduction done after which she was supposed to get MRI done which she never got. She has been walking using crutches, mostly bed bound.  She is not eating well and lost 12 pounds in a month. Pt is not sexually active and has one     INTERVAL HPI/OVERNIGHT EVENTS:     MEDICATIONS  (STANDING):  enoxaparin Injectable 70 milliGRAM(s) SubCutaneous every 12 hours  FIRST- Mouthwash  BLM 10 milliLiter(s) Swish and Spit three times a day  pantoprazole    Tablet 40 milliGRAM(s) Oral before breakfast  sodium chloride 0.9%. 1000 milliLiter(s) (100 mL/Hr) IV Continuous <Continuous>    MEDICATIONS  (PRN):      REVIEW OF SYSTEMS:  CONSTITUTIONAL: No fever, weight loss, or fatigue  RESPIRATORY: No cough, wheezing, chills or hemoptysis; No shortness of breath  CARDIOVASCULAR: No chest pain, palpitations, dizziness, or leg swelling  GASTROINTESTINAL: No abdominal pain. No nausea, vomiting, or hematemesis; No diarrhea or constipation. No melena or hematochezia.  GENITOURINARY: No dysuria or hematuria, urinary frequency  NEUROLOGICAL: No headaches, memory loss, loss of strength, numbness, or tremors  SKIN: No itching, burning, rashes, or lesions     Vital Signs Last 24 Hrs  T(C): 36.8 (07 Mar 2021 07:26), Max: 37.3 (06 Mar 2021 20:03)  T(F): 98.2 (07 Mar 2021 07:26), Max: 99.1 (06 Mar 2021 20:03)  HR: 81 (07 Mar 2021 07:26) (81 - 98)  BP: 100/63 (07 Mar 2021 07:26) (99/60 - 113/57)  BP(mean): --  RR: 18 (07 Mar 2021 07:26) (17 - 18)  SpO2: 96% (07 Mar 2021 07:26) (95% - 98%)    PHYSICAL EXAMINATION:  GENERAL: NAD, well built  HEAD:  Atraumatic, Normocephalic  EYES:  conjunctiva and sclera clear  NECK: Supple, No JVD, Normal thyroid  CHEST/LUNG: Clear to auscultation. Clear to percussion bilaterally; No rales, rhonchi, wheezing, or rubs  HEART: Regular rate and rhythm; No murmurs, rubs, or gallops  ABDOMEN: Soft, Nontender, Nondistended; Bowel sounds present  NERVOUS SYSTEM:  Alert & Oriented X3,    EXTREMITIES:  2+ Peripheral Pulses, No clubbing, cyanosis, or edema  SKIN: warm dry                          11.2   6.20  )-----------( 341      ( 07 Mar 2021 07:50 )             35.5     03-07    140  |  106  |  6<L>  ----------------------------<  73  3.5   |  24  |  0.60    Ca    9.1      07 Mar 2021 07:50  Phos  3.2     03-07  Mg     2.1     03-07    TPro  7.0  /  Alb  3.3<L>  /  TBili  0.8  /  DBili  x   /  AST  5<L>  /  ALT  15  /  AlkPhos  54  03-07    LIVER FUNCTIONS - ( 07 Mar 2021 07:50 )  Alb: 3.3 g/dL / Pro: 7.0 g/dL / ALK PHOS: 54 U/L / ALT: 15 U/L DA / AST: 5 U/L / GGT: x           CARDIAC MARKERS ( 05 Mar 2021 11:17 )  <0.015 ng/mL / x     / x     / x     / x          PT/INR - ( 05 Mar 2021 19:09 )   PT: 15.8 sec;   INR: 1.35 ratio             CAPILLARY BLOOD GLUCOSE      RADIOLOGY & ADDITIONAL TESTS:                   PGY-1 Progress Note discussed with attending    PAGER #: [[phone]] TILL 5:00 PM  PLEASE CONTACT ON CALL TEAM:  - On Call Team (Please refer to Colleen) FROM 5:00 PM - 8:30PM  - Nightfloat Team FROM 8:30 -7:30 AM    CHIEF COMPLAINT & BRIEF HOSPITAL COURSE: 24 y/o Female with medical history significant of Anemia, herpes presented with chest pain since the morning of admission. She states that she had L sided chest pain, sharp pain, lasting for few seconds, not aggravated with exercise. Pt p/w palpitations and nausea, she was diagnosed with Herpes and received Acyclovir and IVF on Feb 23rd.  She had allergic reaction to acyclovir after 1 st dose and later stopped it. She sprained her ankle in January. She had dislocation, had closed reduction done after which she was supposed to get MRI done which she never got. She has been walking using crutches, mostly bed bound.  She is not eating well and lost 12 pounds in a month. Pt is not sexually active and has one     INTERVAL HPI/OVERNIGHT EVENTS:     MEDICATIONS  (STANDING):  enoxaparin Injectable 70 milliGRAM(s) SubCutaneous every 12 hours  FIRST- Mouthwash  BLM 10 milliLiter(s) Swish and Spit three times a day  pantoprazole    Tablet 40 milliGRAM(s) Oral before breakfast  sodium chloride 0.9%. 1000 milliLiter(s) (100 mL/Hr) IV Continuous <Continuous>    MEDICATIONS  (PRN):      REVIEW OF SYSTEMS:  CONSTITUTIONAL: No fever, weight loss, or fatigue  RESPIRATORY: No cough, wheezing, chills or hemoptysis; No shortness of breath  CARDIOVASCULAR: No chest pain, palpitations, dizziness, or leg swelling  GASTROINTESTINAL: No abdominal pain. No nausea, vomiting, or hematemesis; No diarrhea or constipation. No melena or hematochezia.  GENITOURINARY: No dysuria or hematuria, urinary frequency  NEUROLOGICAL: No headaches, memory loss, loss of strength, numbness, or tremors  SKIN: No itching, burning, rashes, or lesions     Vital Signs Last 24 Hrs  T(C): 36.8 (07 Mar 2021 07:26), Max: 37.3 (06 Mar 2021 20:03)  T(F): 98.2 (07 Mar 2021 07:26), Max: 99.1 (06 Mar 2021 20:03)  HR: 81 (07 Mar 2021 07:26) (81 - 98)  BP: 100/63 (07 Mar 2021 07:26) (99/60 - 113/57)  BP(mean): --  RR: 18 (07 Mar 2021 07:26) (17 - 18)  SpO2: 96% (07 Mar 2021 07:26) (95% - 98%)    PHYSICAL EXAMINATION:  GENERAL: NAD, well built  HEAD:  Atraumatic, Normocephalic  EYES:  conjunctiva and sclera clear  NECK: Supple, No JVD, Normal thyroid  CHEST/LUNG: Clear to auscultation. Clear to percussion bilaterally; No rales, rhonchi, wheezing, or rubs  HEART: Regular rate and rhythm; No murmurs, rubs, or gallops  ABDOMEN: Soft, Nontender, Nondistended; Bowel sounds present  NERVOUS SYSTEM:  Alert & Oriented X3,    EXTREMITIES:  2+ Peripheral Pulses, No clubbing, cyanosis, or edema  SKIN: warm dry                          11.2   6.20  )-----------( 341      ( 07 Mar 2021 07:50 )             35.5     03-07    140  |  106  |  6<L>  ----------------------------<  73  3.5   |  24  |  0.60    Ca    9.1      07 Mar 2021 07:50  Phos  3.2     03-07  Mg     2.1     03-07    TPro  7.0  /  Alb  3.3<L>  /  TBili  0.8  /  DBili  x   /  AST  5<L>  /  ALT  15  /  AlkPhos  54  03-07    LIVER FUNCTIONS - ( 07 Mar 2021 07:50 )  Alb: 3.3 g/dL / Pro: 7.0 g/dL / ALK PHOS: 54 U/L / ALT: 15 U/L DA / AST: 5 U/L / GGT: x           CARDIAC MARKERS ( 05 Mar 2021 11:17 )  <0.015 ng/mL / x     / x     / x     / x          PT/INR - ( 05 Mar 2021 19:09 )   PT: 15.8 sec;   INR: 1.35 ratio             CAPILLARY BLOOD GLUCOSE      RADIOLOGY & ADDITIONAL TESTS:                   PGY-1 Progress Note discussed with attending    PAGER #: [[phone]] TILL 5:00 PM  PLEASE CONTACT ON CALL TEAM:  - On Call Team (Please refer to Colleen) FROM 5:00 PM - 8:30PM  - Nightfloat Team FROM 8:30 -7:30 AM    CHIEF COMPLAINT & BRIEF HOSPITAL COURSE: 24 y/o Female with medical history significant of Anemia, herpes presented with chest pain since the morning of admission. She states that she had L sided chest pain, sharp pain, lasting for few seconds, not aggravated with exercise. Pt p/w palpitations and nausea, she was diagnosed with Herpes and received Acyclovir and IVF on Feb 23rd.  She had allergic reaction to acyclovir after 1 st dose and later stopped it. She sprained her ankle in January. She had dislocation, had closed reduction done after which she was supposed to get MRI done which she never got. She has been walking using crutches, mostly bed bound.  She is not eating well and lost 12 pounds in a month. Pt is not sexually active but has a partner.  Pt noted to have pulm embolism, Provoked on CTA chest, was intially on Lovenox full dose transitioned to PO Eliquis today 3/7    INTERVAL HPI/OVERNIGHT EVENTS: Pt reports no chest pain, SOB. Will consult ortho for Ankle sprain w/ dislocation    MEDICATIONS  (STANDING):  enoxaparin Injectable 70 milliGRAM(s) SubCutaneous every 12 hours  FIRST- Mouthwash  BLM 10 milliLiter(s) Swish and Spit three times a day  pantoprazole    Tablet 40 milliGRAM(s) Oral before breakfast  sodium chloride 0.9%. 1000 milliLiter(s) (100 mL/Hr) IV Continuous <Continuous>    MEDICATIONS  (PRN):      REVIEW OF SYSTEMS:  CONSTITUTIONAL: No fever, weight loss, or fatigue  RESPIRATORY: No cough, wheezing, chills or hemoptysis; No shortness of breath  CARDIOVASCULAR: No chest pain, palpitations, dizziness, or leg swelling  GASTROINTESTINAL: No abdominal pain. No nausea, vomiting, or hematemesis; No diarrhea or constipation. No melena or hematochezia.  GENITOURINARY: No dysuria or hematuria, urinary frequency  NEUROLOGICAL: No headaches, memory loss, loss of strength, numbness, or tremors  SKIN: Ankle pain    Vital Signs Last 24 Hrs  T(C): 36.8 (07 Mar 2021 07:26), Max: 37.3 (06 Mar 2021 20:03)  T(F): 98.2 (07 Mar 2021 07:26), Max: 99.1 (06 Mar 2021 20:03)  HR: 81 (07 Mar 2021 07:26) (81 - 98)  BP: 100/63 (07 Mar 2021 07:26) (99/60 - 113/57)  BP(mean): --  RR: 18 (07 Mar 2021 07:26) (17 - 18)  SpO2: 96% (07 Mar 2021 07:26) (95% - 98%)    PHYSICAL EXAMINATION:  GENERAL: NAD, well built  HEAD:  Atraumatic, Normocephalic  EYES:  conjunctiva and sclera clear  NECK: Supple, No JVD, Normal thyroid  CHEST/LUNG: Clear to auscultation. Clear to percussion bilaterally; No rales, rhonchi, wheezing, or rubs  HEART: Regular rate and rhythm; No murmurs, rubs, or gallops  ABDOMEN: Soft, Nontender, Nondistended; Bowel sounds present  NERVOUS SYSTEM:  Alert & Oriented X3,    EXTREMITIES:  2+ Peripheral Pulses, No clubbing, cyanosis, or edema  SKIN: warm dry                          11.2   6.20  )-----------( 341      ( 07 Mar 2021 07:50 )             35.5     03-07    140  |  106  |  6<L>  ----------------------------<  73  3.5   |  24  |  0.60    Ca    9.1      07 Mar 2021 07:50  Phos  3.2     03-07  Mg     2.1     03-07    TPro  7.0  /  Alb  3.3<L>  /  TBili  0.8  /  DBili  x   /  AST  5<L>  /  ALT  15  /  AlkPhos  54  03-07    LIVER FUNCTIONS - ( 07 Mar 2021 07:50 )  Alb: 3.3 g/dL / Pro: 7.0 g/dL / ALK PHOS: 54 U/L / ALT: 15 U/L DA / AST: 5 U/L / GGT: x           CARDIAC MARKERS ( 05 Mar 2021 11:17 )  <0.015 ng/mL / x     / x     / x     / x          PT/INR - ( 05 Mar 2021 19:09 )   PT: 15.8 sec;   INR: 1.35 ratio             CAPILLARY BLOOD GLUCOSE      RADIOLOGY & ADDITIONAL TESTS:

## 2021-03-07 NOTE — PROGRESS NOTE ADULT - ATTENDING COMMENTS
PATIENT SEEN AND EXAMINED. CASE D/W FLOOR TEAM. ABOVE ROS/VS/PE REVIEWED AND VERIFIED.    HPI:  25y Female with medical history significant of Anemia, herpes presented with chest pain since morning. She states the chest pain is on left side, sharp pain, lasting for few seconds, not aggravated with exercise. It is associated with palpitations and nausea.. She was diagnosed with Herpes and received Acyclovir and IVF on Feb 23rd.  She had allergic reaction to acyclovir after 1 st dose and later stopped it. She sprained her ankle in January. She had discloacation, had closed reduction done after which she was supposed to get MRI done which she never got. She has been walking using crutches, mostly bed bound.  She is not eating well and lost 12 pounds in a month. Pt is not sexually active and has one partner. Patient denies rash, joint pain, cough, sputum production, fevers/chills/nausea/vomiting, diarrhea, abdominal pain (05 Mar 2021 18:58)      # PULMONARY EMBOLISM - PLACED ON LOVENOX, REVIEWED CTA CHEST, LOWER EXTREMITY DOPPLER VENOUS/ARTERIAL NEGATIVE, VASCULAR SX CONSULT PLACED, HEME/ONC CONSULT - DR. GAN, PULMONARY CONSULT - DR. BRYSON  - NOTED PATIENT IS STILL BREAST FEEDING; LAST PREGNANCY WAS 2 YEARS AGO  - F/U COVID AB  - DENIES FAMILY HX OF AUTOIMMUNE DISEASE, DENIES HX OF MISCARRIAGES, DENIES HX OF COVID ; HAS BEEN NWB AND SEDENTARY SINCE RECENT ANKLE SPRAIN  # S/P RECENT ANKLE SPRAIN WITH DISLOCATION - ? WAS TOLD TO BE NWB - WILL CONSULT ORTHO TO EVALUATE FURTHER  - PATIENT MAY NEED MRI ANKLE VS. CT ANKLE   # HX OF ANEMIA  # HX OF HSV  # GI PPX ; ON THERAPEUTIC LOVENOX [SERVES FOR DVT PREVENTION]    CARLOS MERCHANT MD COVERING BLAS MERCHANT MD PATIENT SEEN AND EXAMINED. CASE D/W FLOOR TEAM. ABOVE ROS/VS/PE REVIEWED AND VERIFIED.    HPI:  25y Female with medical history significant of Anemia, herpes presented with chest pain since morning. She states the chest pain is on left side, sharp pain, lasting for few seconds, not aggravated with exercise. It is associated with palpitations and nausea.. She was diagnosed with Herpes and received Acyclovir and IVF on Feb 23rd.  She had allergic reaction to acyclovir after 1 st dose and later stopped it. She sprained her ankle in January. She had discloacation, had closed reduction done after which she was supposed to get MRI done which she never got. She has been walking using crutches, mostly bed bound.  She is not eating well and lost 12 pounds in a month. Pt is not sexually active and has one partner. Patient denies rash, joint pain, cough, sputum production, fevers/chills/nausea/vomiting, diarrhea, abdominal pain (05 Mar 2021 18:58)      # PULMONARY EMBOLISM - SWITCH LOVENOX TO ELIQUIS, REVIEWED CTA CHEST, LOWER EXTREMITY DOPPLER VENOUS/ARTERIAL NEGATIVE, VASCULAR SX CONSULT PLACED, F/U ECHOCARDIOGRAM, HEME/ONC CONSULT - DR. GAN, PULMONARY CONSULT - DR. BRYSON  - NOTED PATIENT IS STILL BREAST FEEDING; LAST PREGNANCY WAS 2 YEARS AGO  - COVID AB NEGATIVE; F/U ALONDRA  - DENIES FAMILY HX OF AUTOIMMUNE DISEASE, DENIES HX OF MISCARRIAGES, DENIES HX OF COVID, DENIES USE OF OCP ; HAS BEEN NWB AND SEDENTARY SINCE RECENT ANKLE SPRAIN  # S/P RECENT ANKLE SPRAIN WITH DISLOCATION - ? WAS TOLD TO BE NWB - WILL CONSULT ORTHO TO EVALUATE FURTHER  - PATIENT MAY NEED MRI ANKLE VS. CT ANKLE   # VITAMIN D DEFICIENCY - PLACED ON SUPPLEMENT  # HX OF ANEMIA  # HX OF HSV W/ ? GINGIVOSTOMATITIS THAT IS RESOLVING ? - ID CONSULT PLACED, MAGIC MOUTHWASH  # GI PPX ; ON ELIQUIS    CARLOS MERCHANT MD COVERING BLAS MERCHANT MD

## 2021-03-07 NOTE — PROGRESS NOTE ADULT - SUBJECTIVE AND OBJECTIVE BOX
Time of Visit:  Patient seen and examined.     MEDICATIONS  (STANDING):  ergocalciferol 63850 Unit(s) Oral <User Schedule>  FIRST- Mouthwash  BLM 10 milliLiter(s) Swish and Spit three times a day  pantoprazole    Tablet 40 milliGRAM(s) Oral before breakfast  sodium chloride 0.9%. 1000 milliLiter(s) (100 mL/Hr) IV Continuous <Continuous>      MEDICATIONS  (PRN):  oxymetazoline 0.05% Nasal Spray 1 Spray(s) Both Nostrils two times a day PRN Nasal Congestion       Medications up to date at time of exam.      PHYSICAL EXAMINATION:  Patient has no new complaints.  GENERAL: The patient is a well-developed, well-nourished, in no apparent distress.     Vital Signs Last 24 Hrs  T(C): 37 (07 Mar 2021 20:05), Max: 37.3 (07 Mar 2021 16:27)  T(F): 98.6 (07 Mar 2021 20:05), Max: 99.1 (07 Mar 2021 16:27)  HR: 88 (07 Mar 2021 20:05) (81 - 96)  BP: 107/70 (07 Mar 2021 20:05) (100/63 - 113/57)  BP(mean): --  RR: 18 (07 Mar 2021 20:05) (18 - 18)  SpO2: 97% (07 Mar 2021 20:05) (95% - 98%)   (if applicable)    Chest Tube (if applicable)    HEENT: Head is normocephalic and atraumatic. Extraocular muscles are intact. Mucous membranes are moist.     NECK: Supple, no palpable adenopathy.    LUNGS: Clear to auscultation, no wheezing, rales, or rhonchi.    HEART: Regular rate and rhythm without murmur.    ABDOMEN: Soft, nontender, and nondistended.  No hepatosplenomegaly is noted.    : No painful voiding, no pelvic pain    EXTREMITIES: Without any cyanosis, clubbing, rash, lesions or edema.    NEUROLOGIC: Awake, alert, oriented, grossly intact    SKIN: Warm, dry, good turgor.      LABS:                        11.2   6.20  )-----------( 341      ( 07 Mar 2021 07:50 )             35.5     03-07    140  |  106  |  6<L>  ----------------------------<  73  3.5   |  24  |  0.60    Ca    9.1      07 Mar 2021 07:50  Phos  3.2     03-07  Mg     2.1     03-07    TPro  7.0  /  Alb  3.3<L>  /  TBili  0.8  /  DBili  x   /  AST  5<L>  /  ALT  15  /  AlkPhos  54  03-07                        MICROBIOLOGY: (if applicable)    RADIOLOGY & ADDITIONAL STUDIES:  EKG:   CXR:  ECHO:    IMPRESSION: 25y Female PAST MEDICAL & SURGICAL HISTORY:  Anemia    No pertinent past medical history    No significant past surgical history     p/w         IMP: This is a 25 yr old woman , non smoker with anemia admitted for chest pain due to PE secondary to immobility due to ankle pain ..  Pat is not hypoxic . + oral herpes      Sugg;  -consider DOAC   -cardiac echo   -anticoag for 3 months   -zyvorax for oral herpes   -consider HIV testing   -out pat pulmo f/u

## 2021-03-07 NOTE — PROGRESS NOTE ADULT - PROBLEM SELECTOR PLAN 2
had ankle injury in January  Has been using crutches since then  HAs been immobile for most of the time  Consult ortho in Am  No rest pain ,+ palpable Rt PT/DP pulses and on doppler, mild edema, no paresthesia or tingling on foot. All sensations intact  Vascular surgery recs followed  Will consult ortho had ankle injury in January  Has been using crutches since then  No rest pain ,+ palpable Rt PT/DP pulses and on doppler, mild edema, no paresthesia or tingling on foot. All sensations intact  Vascular surgery recs followed  Ortho Dr Gaston informed, will evaluate tomorrow in AM

## 2021-03-08 DIAGNOSIS — K05.10 CHRONIC GINGIVITIS, PLAQUE INDUCED: ICD-10-CM

## 2021-03-08 LAB
ALBUMIN SERPL ELPH-MCNC: 3.1 G/DL — LOW (ref 3.5–5)
ALP SERPL-CCNC: 48 U/L — SIGNIFICANT CHANGE UP (ref 40–120)
ALT FLD-CCNC: 14 U/L DA — SIGNIFICANT CHANGE UP (ref 10–60)
ANION GAP SERPL CALC-SCNC: 7 MMOL/L — SIGNIFICANT CHANGE UP (ref 5–17)
AST SERPL-CCNC: 6 U/L — LOW (ref 10–40)
BASOPHILS # BLD AUTO: 0.01 K/UL — SIGNIFICANT CHANGE UP (ref 0–0.2)
BASOPHILS NFR BLD AUTO: 0.2 % — SIGNIFICANT CHANGE UP (ref 0–2)
BILIRUB SERPL-MCNC: 0.6 MG/DL — SIGNIFICANT CHANGE UP (ref 0.2–1.2)
BUN SERPL-MCNC: 7 MG/DL — SIGNIFICANT CHANGE UP (ref 7–18)
CALCIUM SERPL-MCNC: 8.7 MG/DL — SIGNIFICANT CHANGE UP (ref 8.4–10.5)
CHLORIDE SERPL-SCNC: 109 MMOL/L — HIGH (ref 96–108)
CO2 SERPL-SCNC: 24 MMOL/L — SIGNIFICANT CHANGE UP (ref 22–31)
CREAT SERPL-MCNC: 0.43 MG/DL — LOW (ref 0.5–1.3)
EOSINOPHIL # BLD AUTO: 0.06 K/UL — SIGNIFICANT CHANGE UP (ref 0–0.5)
EOSINOPHIL NFR BLD AUTO: 1.2 % — SIGNIFICANT CHANGE UP (ref 0–6)
GLUCOSE SERPL-MCNC: 86 MG/DL — SIGNIFICANT CHANGE UP (ref 70–99)
HCT VFR BLD CALC: 33 % — LOW (ref 34.5–45)
HGB BLD-MCNC: 10.3 G/DL — LOW (ref 11.5–15.5)
HIV 1+2 AB+HIV1 P24 AG SERPL QL IA: SIGNIFICANT CHANGE UP
IMM GRANULOCYTES NFR BLD AUTO: 0.4 % — SIGNIFICANT CHANGE UP (ref 0–1.5)
LYMPHOCYTES # BLD AUTO: 1 K/UL — SIGNIFICANT CHANGE UP (ref 1–3.3)
LYMPHOCYTES # BLD AUTO: 20.7 % — SIGNIFICANT CHANGE UP (ref 13–44)
MAGNESIUM SERPL-MCNC: 2 MG/DL — SIGNIFICANT CHANGE UP (ref 1.6–2.6)
MCHC RBC-ENTMCNC: 27.3 PG — SIGNIFICANT CHANGE UP (ref 27–34)
MCHC RBC-ENTMCNC: 31.2 GM/DL — LOW (ref 32–36)
MCV RBC AUTO: 87.5 FL — SIGNIFICANT CHANGE UP (ref 80–100)
MONOCYTES # BLD AUTO: 0.47 K/UL — SIGNIFICANT CHANGE UP (ref 0–0.9)
MONOCYTES NFR BLD AUTO: 9.7 % — SIGNIFICANT CHANGE UP (ref 2–14)
NEUTROPHILS # BLD AUTO: 3.28 K/UL — SIGNIFICANT CHANGE UP (ref 1.8–7.4)
NEUTROPHILS NFR BLD AUTO: 67.8 % — SIGNIFICANT CHANGE UP (ref 43–77)
NRBC # BLD: 0 /100 WBCS — SIGNIFICANT CHANGE UP (ref 0–0)
PHOSPHATE SERPL-MCNC: 3.4 MG/DL — SIGNIFICANT CHANGE UP (ref 2.5–4.5)
PLATELET # BLD AUTO: 307 K/UL — SIGNIFICANT CHANGE UP (ref 150–400)
POTASSIUM SERPL-MCNC: 3.5 MMOL/L — SIGNIFICANT CHANGE UP (ref 3.5–5.3)
POTASSIUM SERPL-SCNC: 3.5 MMOL/L — SIGNIFICANT CHANGE UP (ref 3.5–5.3)
PROT SERPL-MCNC: 6.5 G/DL — SIGNIFICANT CHANGE UP (ref 6–8.3)
RBC # BLD: 3.77 M/UL — LOW (ref 3.8–5.2)
RBC # FLD: 14.7 % — HIGH (ref 10.3–14.5)
SODIUM SERPL-SCNC: 140 MMOL/L — SIGNIFICANT CHANGE UP (ref 135–145)
WBC # BLD: 4.84 K/UL — SIGNIFICANT CHANGE UP (ref 3.8–10.5)
WBC # FLD AUTO: 4.84 K/UL — SIGNIFICANT CHANGE UP (ref 3.8–10.5)

## 2021-03-08 PROCEDURE — 73721 MRI JNT OF LWR EXTRE W/O DYE: CPT | Mod: 26,RT

## 2021-03-08 RX ORDER — OXYCODONE HYDROCHLORIDE 5 MG/1
10 TABLET ORAL EVERY 4 HOURS
Refills: 0 | Status: DISCONTINUED | OUTPATIENT
Start: 2021-03-08 | End: 2021-03-10

## 2021-03-08 RX ORDER — POTASSIUM CHLORIDE 20 MEQ
40 PACKET (EA) ORAL ONCE
Refills: 0 | Status: COMPLETED | OUTPATIENT
Start: 2021-03-08 | End: 2021-03-08

## 2021-03-08 RX ADMIN — DIPHENHYDRAMINE HYDROCHLORIDE AND LIDOCAINE HYDROCHLORIDE AND ALUMINUM HYDROXIDE AND MAGNESIUM HYDRO 10 MILLILITER(S): KIT at 14:57

## 2021-03-08 RX ADMIN — PANTOPRAZOLE SODIUM 40 MILLIGRAM(S): 20 TABLET, DELAYED RELEASE ORAL at 06:03

## 2021-03-08 RX ADMIN — SODIUM CHLORIDE 100 MILLILITER(S): 9 INJECTION INTRAMUSCULAR; INTRAVENOUS; SUBCUTANEOUS at 00:15

## 2021-03-08 RX ADMIN — Medication 40 MILLIEQUIVALENT(S): at 14:57

## 2021-03-08 RX ADMIN — APIXABAN 10 MILLIGRAM(S): 2.5 TABLET, FILM COATED ORAL at 17:44

## 2021-03-08 NOTE — PROGRESS NOTE ADULT - SUBJECTIVE AND OBJECTIVE BOX
Time of Visit:  Patient seen and examined.     MEDICATIONS  (STANDING):  apixaban 10 milliGRAM(s) Oral every 12 hours  ergocalciferol 78184 Unit(s) Oral <User Schedule>  FIRST- Mouthwash  BLM 10 milliLiter(s) Swish and Spit three times a day  pantoprazole    Tablet 40 milliGRAM(s) Oral before breakfast      MEDICATIONS  (PRN):  oxyCODONE    IR 10 milliGRAM(s) Oral every 4 hours PRN Severe Pain (7 - 10)       Medications up to date at time of exam.    ROS; No fever, chills, cough, congestion.   PHYSICAL EXAMINATION:  Vital Signs Last 24 Hrs  T(C): 36.7 (08 Mar 2021 15:56), Max: 37 (07 Mar 2021 20:05)  T(F): 98 (08 Mar 2021 15:56), Max: 98.6 (07 Mar 2021 20:05)  HR: 83 (08 Mar 2021 15:56) (71 - 88)  BP: 111/67 (08 Mar 2021 15:56) (97/58 - 111/67)  BP(mean): --  RR: 18 (08 Mar 2021 15:56) (16 - 19)  SpO2: 98% (08 Mar 2021 15:56) (95% - 98%)   (if applicable)    General: Alert and oriented. Able to answer question with no SOB. No acute distress.     HEENT: Head is normocephalic and atraumatic. Extraocular muscles are intact. Mucous membranes are moist.     NECK: Supple, no palpable adenopathy.    LUNGS: Clear to auscultation bilaterally with no wheezing, rales, or rhonchi. No use of accessory muscle.     HEART: S1 S2 Regular rate and no click/ rub.     ABDOMEN: Soft, nontender, and nondistended.  Active bowel sounds. No hepatosplenomegaly is noted.    EXTREMITIES: Without any cyanosis, clubbing, rash, lesions or edema.    NEUROLOGIC: Awake, alert, oriented.     SKIN: Warm and moist. Non diaphoretic.       LABS:                        10.3   4.84  )-----------( 307      ( 08 Mar 2021 06:11 )             33.0     03-08    140  |  109<H>  |  7   ----------------------------<  86  3.5   |  24  |  0.43<L>    Ca    8.7      08 Mar 2021 06:11  Phos  3.4     03-08  Mg     2.0     03-08    TPro  6.5  /  Alb  3.1<L>  /  TBili  0.6  /  DBili  x   /  AST  6<L>  /  ALT  14  /  AlkPhos  48  03-08      MICROBIOLOGY: (if applicable)    RADIOLOGY & ADDITIONAL STUDIES:  EKG:   CXR:  ECHO:    IMPRESSION: 25y Female PAST MEDICAL & SURGICAL HISTORY:  Anemia    No pertinent past medical history    No significant past surgical history     Impression: 24 Y/O Female presented in ED with Chest Pain with Dyspnea due to PE secondary to Immobility from Ankle Injury . Found on CT Chest with Pulmonary Embolism in the Distal segmental and subsegmental of Right Lower Lobe and Right Middle Lobe.     Suggestion:   O2 saturation 97% room air. So far saturating good room air. Heart rate 92 from walking back from bathroom to Bed.    Pulmonary oral hygiene care.  On Apixaban 10 mg oral Q 12 hours.  DVT/ GI prophylactic

## 2021-03-08 NOTE — PROGRESS NOTE ADULT - PROBLEM SELECTOR PLAN 2
had ankle injury in January  Has been using crutches since then  No rest pain ,+ palpable Rt PT/DP pulses and on doppler, mild edema, no paresthesia or tingling on foot. All sensations intact  Vascular surgery recs followed  Ortho Dr Gaston informed  F/u MR R ankle

## 2021-03-08 NOTE — CONSULT NOTE ADULT - ENMT COMMENTS
multiple bleeding ulcers over her lower gum line and a few scattered ulcers within her mouth (gingivitis), she also has a geographic tongue

## 2021-03-08 NOTE — PROGRESS NOTE ADULT - SUBJECTIVE AND OBJECTIVE BOX
PGY-1 Progress Note discussed with attending    PAGER #: [[phone]] TILL 5:00 PM  PLEASE CONTACT ON CALL TEAM:  - On Call Team (Please refer to Colleen) FROM 5:00 PM - 8:30PM  - Nightfloat Team FROM 8:30 -7:30 AM    CHIEF COMPLAINT & BRIEF HOSPITAL COURSE:  26 y/o Female with medical history significant of Anemia, herpes presented with chest pain since the morning of admission. She states that she had L sided chest pain, sharp pain, lasting for few seconds, not aggravated with exercise. Pt p/w palpitations and nausea, she was diagnosed with Herpes and received Acyclovir and IVF on Feb 23rd.  She had allergic reaction to acyclovir after 1 st dose and later stopped it. She sprained her ankle in January. She had dislocation, had closed reduction done after which she was supposed to get MRI done which she never got. She has been walking using crutches, mostly bed bound.  She is not eating well and lost 12 pounds in a month. Pt is not sexually active but has a partner.  Pt noted to have pulm embolism, Provoked on CTA chest, was initially on Lovenox full dose transitioned to PO Eliquis 3/7    INTERVAL HPI/OVERNIGHT EVENTS: Patient endorses no new complaints. Awaiting Ortho consult    MEDICATIONS  (STANDING):  apixaban 10 milliGRAM(s) Oral every 12 hours  ergocalciferol 91413 Unit(s) Oral <User Schedule>  FIRST- Mouthwash  BLM 10 milliLiter(s) Swish and Spit three times a day  pantoprazole    Tablet 40 milliGRAM(s) Oral before breakfast  potassium chloride    Tablet ER 40 milliEquivalent(s) Oral once    MEDICATIONS  (PRN):      REVIEW OF SYSTEMS:  CONSTITUTIONAL: No fever, weight loss, or fatigue  RESPIRATORY: No cough, wheezing, chills or hemoptysis; No shortness of breath  CARDIOVASCULAR: No chest pain, palpitations, dizziness, or leg swelling  GASTROINTESTINAL: No abdominal pain. No nausea, vomiting, or hematemesis; No diarrhea or constipation. No melena or hematochezia.  GENITOURINARY: No dysuria or hematuria, urinary frequency  NEUROLOGICAL: No headaches, memory loss, loss of strength, numbness, or tremors  SKIN: No itching, burning, rashes, or lesions     Vital Signs Last 24 Hrs  T(C): 37 (08 Mar 2021 11:21), Max: 37.3 (07 Mar 2021 16:27)  T(F): 98.6 (08 Mar 2021 11:21), Max: 99.1 (07 Mar 2021 16:27)  HR: 71 (08 Mar 2021 11:21) (71 - 88)  BP: 99/57 (08 Mar 2021 11:21) (97/58 - 107/70)  BP(mean): --  RR: 17 (08 Mar 2021 11:21) (16 - 19)  SpO2: 97% (08 Mar 2021 11:21) (95% - 97%)    PHYSICAL EXAMINATION:  GENERAL: NAD, well built  HEAD:  Atraumatic, Normocephalic  EYES:  conjunctiva and sclera clear  NECK: Supple, No JVD, Normal thyroid  CHEST/LUNG: Clear to auscultation. Clear to percussion bilaterally; No rales, rhonchi, wheezing, or rubs  HEART: Regular rate and rhythm; No murmurs, rubs, or gallops  ABDOMEN: Soft, Nontender, Nondistended; Bowel sounds present  NERVOUS SYSTEM:  Alert & Oriented X3,    EXTREMITIES:  Warm and well perfused. 2+ peripheral pulses b/l. No rest pain  SKIN: warm dry                          10.3   4.84  )-----------( 307      ( 08 Mar 2021 06:11 )             33.0     03-08    140  |  109<H>  |  7   ----------------------------<  86  3.5   |  24  |  0.43<L>    Ca    8.7      08 Mar 2021 06:11  Phos  3.4     03-08  Mg     2.0     03-08    TPro  6.5  /  Alb  3.1<L>  /  TBili  0.6  /  DBili  x   /  AST  6<L>  /  ALT  14  /  AlkPhos  48  03-08    LIVER FUNCTIONS - ( 08 Mar 2021 06:11 )  Alb: 3.1 g/dL / Pro: 6.5 g/dL / ALK PHOS: 48 U/L / ALT: 14 U/L DA / AST: 6 U/L / GGT: x                   CAPILLARY BLOOD GLUCOSE      RADIOLOGY & ADDITIONAL TESTS:

## 2021-03-08 NOTE — CONSULT NOTE ADULT - SUBJECTIVE AND OBJECTIVE BOX
HPI:  25y Female with medical history significant of Anemia, herpes presented with chest pain since morning. She states the chest pain is on left side, sharp pain, lasting for few seconds, not aggravated with exercise. It is associated with palpitations and nausea.. She was diagnosed with Herpes and received Acyclovir and IVF on Feb 23rd.  She had allergic reaction to acyclovir after 1 st dose and later stopped it. She sprained her ankle in January. She had discloacation, had closed reduction done after which she was supposed to get MRI done which she never got. She has been walking using crutches, mostly bed bound.  She is not eating well and lost 12 pounds in a month. Pt is not sexually active and has one partner. Patient denies rash, joint pain, cough, sputum production, fevers/chills/nausea/vomiting, diarrhea, abdominal pain (05 Mar 2021 18:58)      Full history noted as above. 24 y/o  female presents with c/o  sharp chest pain .  Patient states that  She felt pain and numbness on her left side of her chest on Friday and pain did not aggravate with exercise.  She admitted burning sensation on her chest and neck.  She feels so exhausted.  Patient has been diagnosed for herpes on 2/23/21.  She stopped taking  Acyclovir after first dosage due to swelling of her lips. Patient had history of genital herpes in the past.    PAST MEDICAL & SURGICAL HISTORY:  Anemia    No pertinent past medical history    No significant past surgical history        No Known Allergies      Meds:  apixaban 10 milliGRAM(s) Oral every 12 hours  ergocalciferol 55807 Unit(s) Oral <User Schedule>  FIRST- Mouthwash  BLM 10 milliLiter(s) Swish and Spit three times a day  pantoprazole    Tablet 40 milliGRAM(s) Oral before breakfast  potassium chloride    Tablet ER 40 milliEquivalent(s) Oral once      SOCIAL HISTORY:  Smoker: NO         ETOH use:  NO            Ilicit Drug use: NO      VITALS:  Vital Signs Last 24 Hrs  T(C): 37 (08 Mar 2021 11:21), Max: 37.3 (07 Mar 2021 16:27)  T(F): 98.6 (08 Mar 2021 11:21), Max: 99.1 (07 Mar 2021 16:27)  HR: 71 (08 Mar 2021 11:21) (71 - 88)  BP: 99/57 (08 Mar 2021 11:21) (97/58 - 107/70)  BP(mean): --  RR: 17 (08 Mar 2021 11:21) (16 - 19)  SpO2: 97% (08 Mar 2021 11:21) (95% - 97%)    LABS/DIAGNOSTIC TESTS:                          10.3   4.84  )-----------( 307      ( 08 Mar 2021 06:11 )             33.0     WBC Count: 4.84 K/uL (03-08 @ 06:11)  WBC Count: 6.20 K/uL (03-07 @ 07:50)  WBC Count: 5.18 K/uL (03-06 @ 08:40)      03-08    140  |  109<H>  |  7   ----------------------------<  86  3.5   |  24  |  0.43<L>    Ca    8.7      08 Mar 2021 06:11  Phos  3.4     03-08  Mg     2.0     03-08    TPro  6.5  /  Alb  3.1<L>  /  TBili  0.6  /  DBili  x   /  AST  6<L>  /  ALT  14  /  AlkPhos  48  03-08          LIVER FUNCTIONS - ( 08 Mar 2021 06:11 )  Alb: 3.1 g/dL / Pro: 6.5 g/dL / ALK PHOS: 48 U/L / ALT: 14 U/L DA / AST: 6 U/L / GGT: x                 LACTATE:    ABG -     CULTURES:       RADIOLOGY:  < from: Xray Chest 1 View AP/PA (03.05.21 @ 12:25) >    EXAM:  XR CHEST AP OR PA 1V                            PROCEDURE DATE:  03/05/2021          INTERPRETATION:  CLINICAL STATEMENT: Chest pain.    TECHNIQUE: AP view of the chest.    COMPARISON: None available.    FINDINGS/  IMPRESSION:  No consolidation or infiltrate.  No pleural effusion.    Heart size within normal limit  -----------------------------------------------------------------------------------------------------------------------------------------------------------------------------  < from: CT Angio Chest w/ IV Cont (03.05.21 @ 15:39) >  EXAM:  CT ANGIO CHEST (W)AW IC                            PROCEDURE DATE:  03/05/2021          INTERPRETATION:  CLINICAL INFORMATION: Chest pain. Rule out pulmonary embolism.    COMPARISON: None.    CONTRAST/COMPLICATIONS:  IV Contrast: Omnipaque 350 / 45 cc administered / 55 cc discarded.  Oral Contrast: NONE  Complications: None reported at time of study completion    PROCEDURE:  CT Angiography of the Chest.  Sagittal and coronal reformats were performed as well as 3D (MIP) reconstructions.    FINDINGS:    LUNGS AND AIRWAYS: Patent central airways.  Lungs are clear.  PLEURA: No pleural effusion.  MEDIASTINUM AND ARVIND: No lymphadenopathy.  VESSELS: Pulmonary thromboembolism in the distal segment branch and subsegmental branch of the posterior basal segment of right lower lobe and segmental branch of the right middle lobe (5-302, 5-261).  HEART: Heart size is normal. No pericardial effusion. No evidence of right heart strain.  CHEST WALL AND LOWER NECK: Within normal limits.  VISUALIZEDUPPER ABDOMEN: Within normal limits.  BONES: Within normal limits.    IMPRESSION:  Pulmonary thromboembolism in the distal segmental and subsegmental branches of right lower lobe and right middle lobe as described.    Findings were discussed by Dr. Alicia with Dr. Waterman with read back at 4:03 PM.    -------------------------------------------------------------------------------------------------------------------------------------------------------------------------------------------------------------------------------------------------------  EXAM:  US DPLX LWR EXT ARTS COMPL BI                            PROCEDURE DATE:  03/06/2021          INTERPRETATION:  US LOWER EXTREMITY ARTERIAL DUPLEX COMPLETE BILATERAL    CLINICAL INDICATION: Peripheral vascular disease with decrease dorsalis pedis and posterior tibial pulses    COMPARISON: None    Real-time sonography of the bilateral lower extremity arterial system was performed using a high-resolution linear array transducer and including color and spectral Doppler.    There is no measurable plaque in the lower extremity arteries.. No soft tissue abnormalities are demonstrated in either leg. Flow phase patterns and peak systolic velocity measurements (in cm/s) were observed as follows:    RIGHT:  CFA: Triphasic; 124  Proximal SFA: Triphasic; 133  Mid SFA: Triphasic; 144  Distal SFA: Triphasic;  125  Popliteal: Triphasic;  81  Anterior tibial: Triphasic; 80  Posterior tibial: Triphasic; 89  Peroneal: Triphasic;  57  Dorsalis pedis: Triphasic;  44    LEFT:  CFA: Triphasic; 116  Proximal SFA: Triphasic; 126  Mid SFA: Triphasic; 124  Distal SFA: Triphasic; 108  Popliteal: Triphasic;  77  Anterior tibial: Triphasic; 82  Posterior tibial: Triphasic; 68  Peroneal: Triphasic; 56  Dorsalis pedis: Triphasic;  61    IMPRESSION:    No significant stenosis or occlusion in either leg  ----------------------------------------------------------------------------------------------------------------------------------------------------------------------------------------------------------------------------------------------------------------------------------  `< from: US Duplex Venous Lower Ext Complete, Bilateral (03.05.21 @ 22:06) >    EXAM:  US DPLX LWR EXT VEINS COMPL BI                            PROCEDURE DATE:  03/05/2021          INTERPRETATION:  CLINICAL INFORMATION: Pleuritic chest pain with pulmonary embolism on CTA.    COMPARISON: None available.    TECHNIQUE: Duplex sonography of the BILATERAL LOWER extremity veins with color and spectral Doppler, with and without compression.    FINDINGS:    There is normal compressibility of the bilateral common femoral, femoral and popliteal veins.  Doppler examination shows normal spontaneous and phasic flow.    No calf vein thrombosis is detected.    IMPRESSION:  No evidence of deep venous thrombosis in either lower extremity.      < end of copied text >            ROS  [  ] UNABLE TO ELICIT               HPI:  25y Female with medical history significant of Anemia, herpes presented with chest pain since morning. She states the chest pain is on left side, sharp pain, lasting for few seconds, not aggravated with exercise. It is associated with palpitations and nausea.. She was diagnosed with Herpes and received Acyclovir and IVF on Feb 23rd.  She had allergic reaction to acyclovir after 1 st dose and later stopped it. She sprained her ankle in January. She had discloacation, had closed reduction done after which she was supposed to get MRI done which she never got. She has been walking using crutches, mostly bed bound.  She is not eating well and lost 12 pounds in a month. Pt is not sexually active and has one partner. Patient denies rash, joint pain, cough, sputum production, fevers/chills/nausea/vomiting, diarrhea, abdominal pain (05 Mar 2021 18:58)      Full history noted as above. 26 y/o  female presents with c/o  sharp chest pain .  Patient states that  She felt pain and numbness on her left side of her chest on Friday and pain did not aggravate with exercise.  She admitted burning sensation on her chest and neck.  She feels so exhausted.  Patient has been diagnosed with herpes simplex on 2/23/21.  She stopped taking  Acyclovir after second dose due to swelling of her lips( saw photos and can not tell if she developed a severe reaction to acyclovir or her herpes got worse), her herpes of her lips has resolved at this time. Patient had history of genital herpes in the past.  Asked to see patient as she has chronic ulcers in her mouth and lesions on her tongue. She has no dysphagia but has pain on mastication, she has no fevers or chills. She was found to have pulmonary embolism possibly secondary to a DVT in her leg from immobilization post trauma.      PAST MEDICAL & SURGICAL HISTORY:  Anemia    No pertinent past medical history    No significant past surgical history        No Known Allergies      Meds:  apixaban 10 milliGRAM(s) Oral every 12 hours  ergocalciferol 18619 Unit(s) Oral <User Schedule>  FIRST- Mouthwash  BLM 10 milliLiter(s) Swish and Spit three times a day  pantoprazole    Tablet 40 milliGRAM(s) Oral before breakfast  potassium chloride    Tablet ER 40 milliEquivalent(s) Oral once      SOCIAL HISTORY:  Smoker: NO         ETOH use:  NO            Ilicit Drug use: NO      VITALS:  Vital Signs Last 24 Hrs  T(C): 37 (08 Mar 2021 11:21), Max: 37.3 (07 Mar 2021 16:27)  T(F): 98.6 (08 Mar 2021 11:21), Max: 99.1 (07 Mar 2021 16:27)  HR: 71 (08 Mar 2021 11:21) (71 - 88)  BP: 99/57 (08 Mar 2021 11:21) (97/58 - 107/70)  BP(mean): --  RR: 17 (08 Mar 2021 11:21) (16 - 19)  SpO2: 97% (08 Mar 2021 11:21) (95% - 97%)    LABS/DIAGNOSTIC TESTS:                          10.3   4.84  )-----------( 307      ( 08 Mar 2021 06:11 )             33.0     WBC Count: 4.84 K/uL (03-08 @ 06:11)  WBC Count: 6.20 K/uL (03-07 @ 07:50)  WBC Count: 5.18 K/uL (03-06 @ 08:40)      03-08    140  |  109<H>  |  7   ----------------------------<  86  3.5   |  24  |  0.43<L>    Ca    8.7      08 Mar 2021 06:11  Phos  3.4     03-08  Mg     2.0     03-08    TPro  6.5  /  Alb  3.1<L>  /  TBili  0.6  /  DBili  x   /  AST  6<L>  /  ALT  14  /  AlkPhos  48  03-08          LIVER FUNCTIONS - ( 08 Mar 2021 06:11 )  Alb: 3.1 g/dL / Pro: 6.5 g/dL / ALK PHOS: 48 U/L / ALT: 14 U/L DA / AST: 6 U/L / GGT: x                 LACTATE:    ABG -     CULTURES:       RADIOLOGY:  < from: Xray Chest 1 View AP/PA (03.05.21 @ 12:25) >    EXAM:  XR CHEST AP OR PA 1V                            PROCEDURE DATE:  03/05/2021          INTERPRETATION:  CLINICAL STATEMENT: Chest pain.    TECHNIQUE: AP view of the chest.    COMPARISON: None available.    FINDINGS/  IMPRESSION:  No consolidation or infiltrate.  No pleural effusion.    Heart size within normal limit  -----------------------------------------------------------------------------------------------------------------------------------------------------------------------------  < from: CT Angio Chest w/ IV Cont (03.05.21 @ 15:39) >  EXAM:  CT ANGIO CHEST (W)AW IC                            PROCEDURE DATE:  03/05/2021          INTERPRETATION:  CLINICAL INFORMATION: Chest pain. Rule out pulmonary embolism.    COMPARISON: None.    CONTRAST/COMPLICATIONS:  IV Contrast: Omnipaque 350 / 45 cc administered / 55 cc discarded.  Oral Contrast: NONE  Complications: None reported at time of study completion    PROCEDURE:  CT Angiography of the Chest.  Sagittal and coronal reformats were performed as well as 3D (MIP) reconstructions.    FINDINGS:    LUNGS AND AIRWAYS: Patent central airways.  Lungs are clear.  PLEURA: No pleural effusion.  MEDIASTINUM AND ARVIND: No lymphadenopathy.  VESSELS: Pulmonary thromboembolism in the distal segment branch and subsegmental branch of the posterior basal segment of right lower lobe and segmental branch of the right middle lobe (5-302, 5-261).  HEART: Heart size is normal. No pericardial effusion. No evidence of right heart strain.  CHEST WALL AND LOWER NECK: Within normal limits.  VISUALIZEDUPPER ABDOMEN: Within normal limits.  BONES: Within normal limits.    IMPRESSION:  Pulmonary thromboembolism in the distal segmental and subsegmental branches of right lower lobe and right middle lobe as described.    Findings were discussed by Dr. Alicia with Dr. Waterman with read back at 4:03 PM.    -------------------------------------------------------------------------------------------------------------------------------------------------------------------------------------------------------------------------------------------------------  EXAM:  US DPLX LWR EXT ARTS COMPL BI                            PROCEDURE DATE:  03/06/2021          INTERPRETATION:  US LOWER EXTREMITY ARTERIAL DUPLEX COMPLETE BILATERAL    CLINICAL INDICATION: Peripheral vascular disease with decrease dorsalis pedis and posterior tibial pulses    COMPARISON: None    Real-time sonography of the bilateral lower extremity arterial system was performed using a high-resolution linear array transducer and including color and spectral Doppler.    There is no measurable plaque in the lower extremity arteries.. No soft tissue abnormalities are demonstrated in either leg. Flow phase patterns and peak systolic velocity measurements (in cm/s) were observed as follows:    RIGHT:  CFA: Triphasic; 124  Proximal SFA: Triphasic; 133  Mid SFA: Triphasic; 144  Distal SFA: Triphasic;  125  Popliteal: Triphasic;  81  Anterior tibial: Triphasic; 80  Posterior tibial: Triphasic; 89  Peroneal: Triphasic;  57  Dorsalis pedis: Triphasic;  44    LEFT:  CFA: Triphasic; 116  Proximal SFA: Triphasic; 126  Mid SFA: Triphasic; 124  Distal SFA: Triphasic; 108  Popliteal: Triphasic;  77  Anterior tibial: Triphasic; 82  Posterior tibial: Triphasic; 68  Peroneal: Triphasic; 56  Dorsalis pedis: Triphasic;  61    IMPRESSION:    No significant stenosis or occlusion in either leg  ----------------------------------------------------------------------------------------------------------------------------------------------------------------------------------------------------------------------------------------------------------------------------------  `< from: US Duplex Venous Lower Ext Complete, Bilateral (03.05.21 @ 22:06) >    EXAM:  US DPLX LWR EXT VEINS COMPL BI                            PROCEDURE DATE:  03/05/2021          INTERPRETATION:  CLINICAL INFORMATION: Pleuritic chest pain with pulmonary embolism on CTA.    COMPARISON: None available.    TECHNIQUE: Duplex sonography of the BILATERAL LOWER extremity veins with color and spectral Doppler, with and without compression.    FINDINGS:    There is normal compressibility of the bilateral common femoral, femoral and popliteal veins.  Doppler examination shows normal spontaneous and phasic flow.    No calf vein thrombosis is detected.    IMPRESSION:  No evidence of deep venous thrombosis in either lower extremity.      < end of copied text >            ROS  [  ] UNABLE TO ELICIT

## 2021-03-08 NOTE — CONSULT NOTE ADULT - ASSESSMENT
Gingivitis   Geographic tongue  No signs of active herpes simplex at this time    Plan - Listerine mouth washes  Magic mouthwash prn  needs outpatient dental follow up  reconsult prn.

## 2021-03-08 NOTE — PROGRESS NOTE ADULT - ATTENDING COMMENTS
PATIENT SEEN AND EXAMINED. CASE D/W FLOOR TEAM. ABOVE ROS/VS/PE REVIEWED AND VERIFIED.    HPI:  25y Female with medical history significant of Anemia, herpes presented with chest pain since morning. She states the chest pain is on left side, sharp pain, lasting for few seconds, not aggravated with exercise. It is associated with palpitations and nausea.. She was diagnosed with Herpes and received Acyclovir and IVF on Feb 23rd.  She had allergic reaction to acyclovir after 1 st dose and later stopped it. She sprained her ankle in January. She had discloacation, had closed reduction done after which she was supposed to get MRI done which she never got. She has been walking using crutches, mostly bed bound.  She is not eating well and lost 12 pounds in a month. Pt is not sexually active and has one partner. Patient denies rash, joint pain, cough, sputum production, fevers/chills/nausea/vomiting, diarrhea, abdominal pain (05 Mar 2021 18:58)      # PULMONARY EMBOLISM - SWITCH LOVENOX TO ELIQUIS, REVIEWED CTA CHEST, LOWER EXTREMITY DOPPLER VENOUS/ARTERIAL NEGATIVE, VASCULAR SX CONSULT PLACED, F/U ECHOCARDIOGRAM, HEME/ONC CONSULT - DR. GAN, PULMONARY CONSULT - DR. BRYSON  - NOTED PATIENT IS STILL BREAST FEEDING; LAST PREGNANCY WAS 2 YEARS AGO  - COVID AB NEGATIVE; F/U ALONDRA  - DENIES FAMILY HX OF AUTOIMMUNE DISEASE, DENIES HX OF MISCARRIAGES, DENIES HX OF COVID, DENIES USE OF OCP ; HAS BEEN NWB AND SEDENTARY SINCE RECENT ANKLE SPRAIN  # RECENT INJURY OF RIGHT ANKLE AND WAS ASKED TO BE NWB   - MR ANKLE DEMONSTRATES - BONE CONTUSION, SUBCHONDRAL TRABECULAR FRACTURE, SMALL ANKLE JOINT EFFUSION, TEAR OF THE ANTERIOR TALOFIBULAR LIGAMENT, COMPLETE TEAR OF THE CALCANEOFIBULAR LIGAMENT, SPRAIN, HIGH GRADE TEAR OF THE DEEP TIBIOTALAR LIGAMENT AND PARTIAL THICKNESS TEAR AT THE TALOCALCANEAL LIGAMENT, HIGH-GRADE PARTIAL THICKNESS TEAR AT THE DEEP DELTOID LIGAMENT AND MODERATE PARTIAL THICKNESS TEAR AT THE SUPERFICIAL DELTOID LIGAMENT  - ORTHOPEDIC CONSULT IN PLACE  # VITAMIN D DEFICIENCY - PLACED ON SUPPLEMENT  # HX OF ANEMIA  # HX OF HSV W/ ? GINGIVOSTOMATITIS, SUSPECTED SIALODENTITIS AND GEOGRAPHIC TONGUE  - ID CONSULT PLACED, MAGIC MOUTHWASH, RECOMMEND OUTPATIENT F/U WITH DENTIST  # GI PPX ; ON ELIQUIS    CARLOS MERCHANT MD COVERING BLAS MERCHANT MD.

## 2021-03-08 NOTE — PROGRESS NOTE ADULT - SUBJECTIVE AND OBJECTIVE BOX
no sob or chest pain  no fever or chills  lip is much better than before  the gigivostomatitis started 2/21  she had some fever chills that time  did not use BCP  grandmo had DVT while taking chemo  no family hx of lupus    MEDICATIONS  (STANDING):  apixaban 10 milliGRAM(s) Oral every 12 hours  ergocalciferol 33025 Unit(s) Oral <User Schedule>  FIRST- Mouthwash  BLM 10 milliLiter(s) Swish and Spit three times a day  pantoprazole    Tablet 40 milliGRAM(s) Oral before breakfast  sodium chloride 0.9%. 1000 milliLiter(s) (100 mL/Hr) IV Continuous <Continuous>    MEDICATIONS  (PRN):      Allergies    No Known Allergies    Intolerances        Vital Signs Last 24 Hrs  T(C): 36.9 (08 Mar 2021 07:49), Max: 37.3 (07 Mar 2021 16:27)  T(F): 98.4 (08 Mar 2021 07:49), Max: 99.1 (07 Mar 2021 16:27)  HR: 80 (08 Mar 2021 07:49) (80 - 90)  BP: 97/58 (08 Mar 2021 07:49) (97/58 - 107/70)  BP(mean): --  RR: 16 (08 Mar 2021 07:49) (16 - 19)  SpO2: 95% (08 Mar 2021 07:49) (95% - 97%)    PHYSICAL EXAM  General: adult in NAD  HEENT: clear oropharynx, anicteric sclera, pink conjunctiva  Neck: supple  CV: normal S1/S2 with no murmur rubs or gallops  Lungs: positive air movement b/l ant lungs,clear to auscultation, no wheezes, no rales  Abdomen: soft non-tender non-distended, no hepatosplenomegaly  Ext: no clubbing cyanosis or edema  Skin: no rashes and no petechiae  Neuro: alert and oriented X 4, no focal deficits  LABS:                          10.3   4.84  )-----------( 307      ( 08 Mar 2021 06:11 )             33.0         Mean Cell Volume : 87.5 fl  Mean Cell Hemoglobin : 27.3 pg  Mean Cell Hemoglobin Concentration : 31.2 gm/dL  Auto Neutrophil # : 3.28 K/uL  Auto Lymphocyte # : 1.00 K/uL  Auto Monocyte # : 0.47 K/uL  Auto Eosinophil # : 0.06 K/uL  Auto Basophil # : 0.01 K/uL  Auto Neutrophil % : 67.8 %  Auto Lymphocyte % : 20.7 %  Auto Monocyte % : 9.7 %  Auto Eosinophil % : 1.2 %  Auto Basophil % : 0.2 %    Serial CBC  Hematocrit 33.0  Hemoglobin 10.3  Plat 307  RBC 3.77  WBC 4.84  Serial CBC  Hematocrit 35.5  Hemoglobin 11.2  Plat 341  RBC 4.15  WBC 6.20  Serial CBC  Hematocrit 37.2  Hemoglobin 11.6  Plat 369  RBC 4.37  WBC 5.18  Serial CBC  Hematocrit 39.2  Hemoglobin 12.3  Plat 369  RBC 4.59  WBC 7.88    03-08    140  |  109<H>  |  7   ----------------------------<  86  3.5   |  24  |  0.43<L>    Ca    8.7      08 Mar 2021 06:11  Phos  3.4     03-08  Mg     2.0     03-08    TPro  6.5  /  Alb  3.1<L>  /  TBili  0.6  /  DBili  x   /  AST  6<L>  /  ALT  14  /  AlkPhos  48  03-08          Folate, Serum: 15.9 ng/mL (03-06 @ 02:41)  Vitamin B12, Serum: 812 pg/mL (03-06 @ 02:41)            BLOOD SMEAR INTERPRETATION:       RADIOLOGY & ADDITIONAL STUDIES:

## 2021-03-08 NOTE — CONSULT NOTE ADULT - GASTROINTESTINAL DETAILS
normal/soft/nontender/no distention/no masses palpable soft/nontender/no distention/no masses palpable/bowel sounds normal/no rebound tenderness/no guarding/no rigidity/no organomegaly

## 2021-03-08 NOTE — CONSULT NOTE ADULT - ATTENDING COMMENTS
I Festus Ragland MD performed a history and physical exam of the patient and discussed  the findings and plan with the house officer. I reviewed the resident note and agree with the findings and plan
pt seen and  examined with ID resident

## 2021-03-09 DIAGNOSIS — R20.9 UNSPECIFIED DISTURBANCES OF SKIN SENSATION: ICD-10-CM

## 2021-03-09 LAB
ANA TITR SER: NEGATIVE — SIGNIFICANT CHANGE UP
ANION GAP SERPL CALC-SCNC: 7 MMOL/L — SIGNIFICANT CHANGE UP (ref 5–17)
BUN SERPL-MCNC: 8 MG/DL — SIGNIFICANT CHANGE UP (ref 7–18)
CALCIUM SERPL-MCNC: 9.3 MG/DL — SIGNIFICANT CHANGE UP (ref 8.4–10.5)
CHLORIDE SERPL-SCNC: 107 MMOL/L — SIGNIFICANT CHANGE UP (ref 96–108)
CO2 SERPL-SCNC: 27 MMOL/L — SIGNIFICANT CHANGE UP (ref 22–31)
CREAT SERPL-MCNC: 0.63 MG/DL — SIGNIFICANT CHANGE UP (ref 0.5–1.3)
GLUCOSE SERPL-MCNC: 73 MG/DL — SIGNIFICANT CHANGE UP (ref 70–99)
HCT VFR BLD CALC: 37.7 % — SIGNIFICANT CHANGE UP (ref 34.5–45)
HGB BLD-MCNC: 11.6 G/DL — SIGNIFICANT CHANGE UP (ref 11.5–15.5)
INR BLD: 2.25 RATIO — HIGH (ref 0.88–1.16)
MCHC RBC-ENTMCNC: 26.5 PG — LOW (ref 27–34)
MCHC RBC-ENTMCNC: 30.8 GM/DL — LOW (ref 32–36)
MCV RBC AUTO: 86.3 FL — SIGNIFICANT CHANGE UP (ref 80–100)
NRBC # BLD: 0 /100 WBCS — SIGNIFICANT CHANGE UP (ref 0–0)
PLATELET # BLD AUTO: 338 K/UL — SIGNIFICANT CHANGE UP (ref 150–400)
POTASSIUM SERPL-MCNC: 3.9 MMOL/L — SIGNIFICANT CHANGE UP (ref 3.5–5.3)
POTASSIUM SERPL-SCNC: 3.9 MMOL/L — SIGNIFICANT CHANGE UP (ref 3.5–5.3)
PROTHROM AB SERPL-ACNC: 25.8 SEC — HIGH (ref 10.6–13.6)
RBC # BLD: 4.37 M/UL — SIGNIFICANT CHANGE UP (ref 3.8–5.2)
RBC # FLD: 14.5 % — SIGNIFICANT CHANGE UP (ref 10.3–14.5)
SARS-COV-2 IGG SERPL IA-ACNC: <0.3 RATIO — SIGNIFICANT CHANGE UP
SARS-COV-2 IGG SERPL QL IA: NEGATIVE — SIGNIFICANT CHANGE UP
SARS-COV-2 IGG SERPL QL IA: NEGATIVE — SIGNIFICANT CHANGE UP
SARS-COV-2 IGM SERPL IA-ACNC: 0.25 RATIO — SIGNIFICANT CHANGE UP
SARS-COV-2 RNA SPEC QL NAA+PROBE: SIGNIFICANT CHANGE UP
SODIUM SERPL-SCNC: 141 MMOL/L — SIGNIFICANT CHANGE UP (ref 135–145)
WBC # BLD: 5.39 K/UL — SIGNIFICANT CHANGE UP (ref 3.8–10.5)
WBC # FLD AUTO: 5.39 K/UL — SIGNIFICANT CHANGE UP (ref 3.8–10.5)

## 2021-03-09 PROCEDURE — 99223 1ST HOSP IP/OBS HIGH 75: CPT

## 2021-03-09 RX ORDER — ENOXAPARIN SODIUM 100 MG/ML
70 INJECTION SUBCUTANEOUS
Refills: 0 | Status: DISCONTINUED | OUTPATIENT
Start: 2021-03-09 | End: 2021-03-10

## 2021-03-09 RX ADMIN — PANTOPRAZOLE SODIUM 40 MILLIGRAM(S): 20 TABLET, DELAYED RELEASE ORAL at 06:47

## 2021-03-09 RX ADMIN — ENOXAPARIN SODIUM 70 MILLIGRAM(S): 100 INJECTION SUBCUTANEOUS at 17:09

## 2021-03-09 RX ADMIN — APIXABAN 10 MILLIGRAM(S): 2.5 TABLET, FILM COATED ORAL at 06:47

## 2021-03-09 NOTE — PROGRESS NOTE ADULT - PROBLEM SELECTOR PLAN 3
Hx of Gingivostomatitis, placed on mouthwash  ID Dr. Prieto consulted  F/u HSV PCR
Transitioned from full dose lovenox to Eliquis 10 bid x 7 days
Hx of Gingivostomatitis, placed on mouthwash  ID Dr. Prieto consulted  Advised Listerine mouthwash, Dental follow up upon d/c  F/u HSV PCR

## 2021-03-09 NOTE — PROGRESS NOTE ADULT - ATTENDING COMMENTS
PATIENT SEEN AND EXAMINED. CASE D/W FLOOR TEAM. ABOVE ROS/VS/PE REVIEWED AND VERIFIED.    HPI:  25y Female with medical history significant of Anemia, herpes presented with chest pain since morning. She states the chest pain is on left side, sharp pain, lasting for few seconds, not aggravated with exercise. It is associated with palpitations and nausea.. She was diagnosed with Herpes and received Acyclovir and IVF on Feb 23rd.  She had allergic reaction to acyclovir after 1 st dose and later stopped it. She sprained her ankle in January. She had discloacation, had closed reduction done after which she was supposed to get MRI done which she never got. She has been walking using crutches, mostly bed bound.  She is not eating well and lost 12 pounds in a month. Pt is not sexually active and has one partner. Patient denies rash, joint pain, cough, sputum production, fevers/chills/nausea/vomiting, diarrhea, abdominal pain (05 Mar 2021 18:58)      # PULMONARY EMBOLISM - PLACED ON LOVENOX WILL HAVE TO USE AS BRIDGE FOR COUMADIN, REVIEWED CTA CHEST, LOWER EXTREMITY DOPPLER VENOUS/ARTERIAL NEGATIVE, VASCULAR SX CONSULT PLACED, HEME/ONC CONSULT - DR. GAN, PULMONARY CONSULT - DR. BRYSON, CARDIOLOGY CONSULT  - NOTED PATIENT IS STILL BREAST FEEDING; LAST PREGNANCY WAS 2 YEARS AGO  - COVID AB NEGATIVE; F/U ALONDRA  - MAGUI - TRACE MR  - DENIES FAMILY HX OF AUTOIMMUNE DISEASE, DENIES HX OF MISCARRIAGES, DENIES HX OF COVID, DENIES USE OF OCP ; HAS BEEN NWB AND SEDENTARY SINCE RECENT ANKLE SPRAIN  # RECENT INJURY OF RIGHT ANKLE AND WAS ASKED TO BE NWB   - MR ANKLE DEMONSTRATES - BONE CONTUSION, SUBCHONDRAL TRABECULAR FRACTURE, SMALL ANKLE JOINT EFFUSION, TEAR OF THE ANTERIOR TALOFIBULAR LIGAMENT, COMPLETE TEAR OF THE CALCANEOFIBULAR LIGAMENT, SPRAIN, HIGH GRADE TEAR OF THE DEEP TIBIOTALAR LIGAMENT AND PARTIAL THICKNESS TEAR AT THE TALOCALCANEAL LIGAMENT, HIGH-GRADE PARTIAL THICKNESS TEAR AT THE DEEP DELTOID LIGAMENT AND MODERATE PARTIAL THICKNESS TEAR AT THE SUPERFICIAL DELTOID LIGAMENT  - PODIATRY RECOMMENDATION PENDING   # VITAMIN D DEFICIENCY - PLACED ON SUPPLEMENT  # PERIODIC NUMBESS/TINGLING VS. BURNING IN UPPER EXTREMITIES AND FACE - NEUROLOGY CONSULT IN PROGRESS - DOES NOT SUSPECT CERVIGALGIA / RADICULOPATHY  # HX OF ANEMIA  # HX OF HSV W/ ? GINGIVOSTOMATITIS, SUSPECTED SIALODENTITIS AND GEOGRAPHIC TONGUE  - ID CONSULT PLACED, MAGIC MOUTHWASH, RECOMMEND OUTPATIENT F/U WITH DENTIST  # GI PPX ; ON LOVENOX    CARLOS MERCHANT MD COVERING BLAS MERCHANT MD.

## 2021-03-09 NOTE — CONSULT NOTE ADULT - SUBJECTIVE AND OBJECTIVE BOX
HISTORY OF PRESENT ILLNESS: HPI:  25y Female with medical history significant of Anemia, herpes presented with chest pain since morning. She states the chest pain is on left side, sharp pain, lasting for few seconds, not aggravated with exercise. It is associated with palpitations and nausea.. She was diagnosed with Herpes and received Acyclovir and IVF on Feb 23rd.  She had allergic reaction to acyclovir after 1 st dose and later stopped it. She sprained her ankle in January. She had discloacation, had closed reduction done after which she was supposed to get MRI done which she never got. She has been walking using crutches, mostly bed bound.  She is not eating well and lost 12 pounds in a month. Patient denies rash, joint pain, cough, sputum production, fevers/chills/nausea/vomiting, diarrhea, abdominal pain.  In the ER a CTPA revealed Pulmonary embolism      PAST MEDICAL & SURGICAL HISTORY:  Anemia    No pertinent past medical history    No significant past surgical history            MEDICATIONS:  MEDICATIONS  (STANDING):  enoxaparin Injectable 70 milliGRAM(s) SubCutaneous two times a day  ergocalciferol 73142 Unit(s) Oral <User Schedule>  FIRST- Mouthwash  BLM 10 milliLiter(s) Swish and Spit three times a day  pantoprazole    Tablet 40 milliGRAM(s) Oral before breakfast      Allergies    No Known Allergies    Intolerances        FAMILY HISTORY:    Non-contributary for premature coronary disease or sudden cardiac death    SOCIAL HISTORY:    [X ] Non-smoker  [ ] Smoker  [ ] Alcohol        REVIEW OF SYSTEMS:  [x ]chest pain  [  ]shortness of breath  [  ]palpitations  [  ]syncope  [ ]near syncope [ ]upper extremity weakness   [ ] lower extremity weakness  [  ]diplopia  [  ]altered mental status   [  ]fevers  [ ]chills [ ]nausea  [ ]vomitting  [  ]dysphagia    [ ]abdominal pain  [ ]melena  [ ]BRBPR    [  ]epistaxis  [  ]rash    [ ]lower extremity edema        [X ] All others negative	  [ ] Unable to obtain      LABS:	 	    CARDIAC MARKERS:                              11.6   5.39  )-----------( 338      ( 09 Mar 2021 07:31 )             37.7     Hb Trend: 11.6<--    03-09    141  |  107  |  8   ----------------------------<  73  3.9   |  27  |  0.63    Ca    9.3      09 Mar 2021 07:31  Phos  3.4     03-08  Mg     2.0     03-08    TPro  6.5  /  Alb  3.1<L>  /  TBili  0.6  /  DBili  x   /  AST  6<L>  /  ALT  14  /  AlkPhos  48  03-08    Creatinine Trend: 0.63<--, 0.43<--, 0.60<--, 0.62<--, 0.68<--    PHYSICAL EXAM:  T(C): 36.8 (03-09-21 @ 07:48), Max: 37 (03-08-21 @ 11:21)  HR: 83 (03-09-21 @ 07:48) (71 - 85)  BP: 91/57 (03-09-21 @ 07:48) (91/57 - 119/80)  RR: 16 (03-09-21 @ 07:48) (16 - 19)  SpO2: 98% (03-09-21 @ 07:48) (97% - 99%)  Wt(kg): --   BMI (kg/m2): 28.2 (03-06-21 @ 00:30)  I&O's Summary    08 Mar 2021 07:01  -  09 Mar 2021 07:00  --------------------------------------------------------  IN: 361 mL / OUT: 400 mL / NET: -39 mL        Gen: Appears well in NAD  HEENT:  (-)icterus (-)pallor  CV: N S1 S2 1/6 ENA (+)2 Pulses B/l  Resp:  Clear to ausculatation B/L, normal effort  GI: (+) BS Soft, NT, ND  Lymph:  (-)Edema, (-)obvious lymphadenopathy  Skin: Warm to touch, Normal turgor  Psych: Appropriate mood and affect        TELEMETRY: 	  sinus    ECG:  	sinus 80 BPM      RADIOLOGY:         CXR:     < from: Xray Chest 1 View AP/PA (03.05.21 @ 12:25) >  No consolidation or infiltrate.  No pleural effusion.    Heart size within normal limits.    < end of copied text >      ASSESSMENT/PLAN: 	25y Female admitted with provoked PE.    - Chest pain is likely due to PE  - Baseline echo to eval RV fx and PA pressures  - Anticoagulation per medical team    I once again thank you for allowing me to participate in the care of your patient.  If you have any questions or concerns please do not hesitate to contact me.    Emir Castellanos MD, [facility]  BEEPER [phone]

## 2021-03-09 NOTE — CONSULT NOTE ADULT - CONSULT REQUESTED DATE/TIME
06-Mar-2021 22:04
06-Mar-2021
09-Mar-2021 11:00
09-Mar-2021 12:02
05-Mar-2021 23:51
09-Mar-2021 17:15
08-Mar-2021 12:22

## 2021-03-09 NOTE — PROGRESS NOTE ADULT - PROBLEM SELECTOR PLAN 2
had ankle injury in January  Has been using crutches since then  No rest pain ,+ palpable Rt PT/DP pulses and on doppler, mild edema, no paresthesia or tingling on foot. All sensations intact  Vascular surgery recs followed  Podiatry recs awaited- MR Ankle shows extensive ligamentous injury and bony contusions

## 2021-03-09 NOTE — PROGRESS NOTE ADULT - PROBLEM SELECTOR PROBLEM 2
Ankle dislocation, left, subsequent encounter

## 2021-03-09 NOTE — PROGRESS NOTE ADULT - PROBLEM SELECTOR PLAN 1
Presented with dyspnea, Saturating well on RA  CT showed PE, Patient has been immobilized most of the time since January  Started on lovenox transitioned to ELIQUIS 10 mg BID x 7 days, 5 mg BID x 3 months  USG LE neg  PE is likely provoked  hem onc consult Dr Hernandez  Await autoimmune workup-ALONDRA pending
DALTON Ragland MD have personally  seen and examined the patient today and have noted the findings and formulated the plan of care.
Presented with dyspnea, Saturating well on RA  CT showed PE, Patient has been immobilized most of the time since January  Back on LOVENOX full dose as Eliquis CANNOT be given during breastfeeding  USG LE neg  PE is likely provoked  Heme onc consult Dr Hernandez  Await autoimmune workup-ALONDRA pending
Presented with dyspnea, Saturating well on RA  CT showed PE, Patient has been immobilized most of the time since January  Started on lovenox transitioned to ELIQUIS 10 mg BID x 7 days, 5 mg BID x 3 months  USG LE neg  PE is likely provoked  hem onc consult Dr Hernandez  Await autoimmune workup-ALONDRA pending

## 2021-03-09 NOTE — PROGRESS NOTE ADULT - SUBJECTIVE AND OBJECTIVE BOX
PGY-1 Progress Note discussed with attending    PAGER #: [[phone]] TILL 5:00 PM  PLEASE CONTACT ON CALL TEAM:  - On Call Team (Please refer to Colleen) FROM 5:00 PM - 8:30PM  - Nightfloat Team FROM 8:30 -7:30 AM    CHIEF COMPLAINT & BRIEF HOSPITAL COURSE:     INTERVAL HPI/OVERNIGHT EVENTS:     MEDICATIONS  (STANDING):  enoxaparin Injectable 70 milliGRAM(s) SubCutaneous two times a day  ergocalciferol 48959 Unit(s) Oral <User Schedule>  FIRST- Mouthwash  BLM 10 milliLiter(s) Swish and Spit three times a day  pantoprazole    Tablet 40 milliGRAM(s) Oral before breakfast    MEDICATIONS  (PRN):  oxyCODONE    IR 10 milliGRAM(s) Oral every 4 hours PRN Severe Pain (7 - 10)      REVIEW OF SYSTEMS:  CONSTITUTIONAL: No fever, weight loss, or fatigue  RESPIRATORY: No cough, wheezing, chills or hemoptysis; No shortness of breath  CARDIOVASCULAR: No chest pain, palpitations, dizziness, or leg swelling  GASTROINTESTINAL: No abdominal pain. No nausea, vomiting, or hematemesis; No diarrhea or constipation. No melena or hematochezia.  GENITOURINARY: No dysuria or hematuria, urinary frequency  NEUROLOGICAL: No headaches, memory loss, loss of strength, numbness, or tremors  SKIN: No itching, burning, rashes, or lesions     Vital Signs Last 24 Hrs  T(C): 36.9 (09 Mar 2021 11:16), Max: 37 (08 Mar 2021 23:27)  T(F): 98.5 (09 Mar 2021 11:16), Max: 98.6 (08 Mar 2021 23:27)  HR: 85 (09 Mar 2021 11:16) (78 - 85)  BP: 97/59 (09 Mar 2021 11:16) (91/57 - 119/80)  BP(mean): --  RR: 18 (09 Mar 2021 11:16) (16 - 19)  SpO2: 96% (09 Mar 2021 11:16) (96% - 99%)    PHYSICAL EXAMINATION:  GENERAL: NAD, well built  HEAD:  Atraumatic, Normocephalic  EYES:  conjunctiva and sclera clear  NECK: Supple, No JVD, Normal thyroid  CHEST/LUNG: Clear to auscultation. Clear to percussion bilaterally; No rales, rhonchi, wheezing, or rubs  HEART: Regular rate and rhythm; No murmurs, rubs, or gallops  ABDOMEN: Soft, Nontender, Nondistended; Bowel sounds present  NERVOUS SYSTEM:  Alert & Oriented X3,    EXTREMITIES:  2+ Peripheral Pulses, No clubbing, cyanosis, or edema  SKIN: warm dry                          11.6   5.39  )-----------( 338      ( 09 Mar 2021 07:31 )             37.7     03-09    141  |  107  |  8   ----------------------------<  73  3.9   |  27  |  0.63    Ca    9.3      09 Mar 2021 07:31  Phos  3.4     03-08  Mg     2.0     03-08    TPro  6.5  /  Alb  3.1<L>  /  TBili  0.6  /  DBili  x   /  AST  6<L>  /  ALT  14  /  AlkPhos  48  03-08    LIVER FUNCTIONS - ( 08 Mar 2021 06:11 )  Alb: 3.1 g/dL / Pro: 6.5 g/dL / ALK PHOS: 48 U/L / ALT: 14 U/L DA / AST: 6 U/L / GGT: x               PT/INR - ( 09 Mar 2021 12:42 )   PT: 25.8 sec;   INR: 2.25 ratio             CAPILLARY BLOOD GLUCOSE      RADIOLOGY & ADDITIONAL TESTS:                   PGY-1 Progress Note discussed with attending    PAGER #: [[phone]] TILL 5:00 PM  PLEASE CONTACT ON CALL TEAM:  - On Call Team (Please refer to Colleen) FROM 5:00 PM - 8:30PM  - Nightfloat Team FROM 8:30 -7:30 AM    CHIEF COMPLAINT & BRIEF HOSPITAL COURSE:  26 y/o Female with medical history significant of Anemia, herpes presented with chest pain since the morning of admission. She states that she had L sided chest pain, sharp pain, lasting for few seconds, not aggravated with exercise. Pt p/w palpitations and nausea, she was diagnosed with Herpes and received Acyclovir and IVF on Feb 23rd.  She had allergic reaction to Acyclovir after 1 st dose and later stopped it. She sprained her ankle in January. She had dislocation, had closed reduction done after which she was supposed to get MRI done which she never got. She has been walking using crutches, mostly bed bound.  She is not eating well and lost 12 pounds in a month. Pt is not sexually active but has a partner.  Pt noted to have pulm embolism, Provoked on CTA chest, now on LOVENOX (cannot take Eliquis as pt is breastfeeding)    INTERVAL HPI/OVERNIGHT EVENTS: Pt has been switched from Eliquis back to Lovenox as pt is breastfeeding. Podiatry recommendations awaited    MEDICATIONS  (STANDING):  enoxaparin Injectable 70 milliGRAM(s) SubCutaneous two times a day  ergocalciferol 48421 Unit(s) Oral <User Schedule>  FIRST- Mouthwash  BLM 10 milliLiter(s) Swish and Spit three times a day  pantoprazole    Tablet 40 milliGRAM(s) Oral before breakfast    MEDICATIONS  (PRN):  oxyCODONE    IR 10 milliGRAM(s) Oral every 4 hours PRN Severe Pain (7 - 10)      REVIEW OF SYSTEMS:  CONSTITUTIONAL: No fever, weight loss, or fatigue  RESPIRATORY: No cough, wheezing, chills or hemoptysis; No shortness of breath  CARDIOVASCULAR: No chest pain, palpitations, dizziness, or leg swelling  GASTROINTESTINAL: No abdominal pain. No nausea, vomiting, or hematemesis; No diarrhea or constipation. No melena or hematochezia.  GENITOURINARY: No dysuria or hematuria, urinary frequency  NEUROLOGICAL: No headaches, memory loss, loss of strength, numbness, or tremors  SKIN: No itching, burning, rashes, or lesions     Vital Signs Last 24 Hrs  T(C): 36.9 (09 Mar 2021 11:16), Max: 37 (08 Mar 2021 23:27)  T(F): 98.5 (09 Mar 2021 11:16), Max: 98.6 (08 Mar 2021 23:27)  HR: 85 (09 Mar 2021 11:16) (78 - 85)  BP: 97/59 (09 Mar 2021 11:16) (91/57 - 119/80)  BP(mean): --  RR: 18 (09 Mar 2021 11:16) (16 - 19)  SpO2: 96% (09 Mar 2021 11:16) (96% - 99%)    PHYSICAL EXAMINATION:  GENERAL: NAD, well built  HEAD:  Atraumatic, Normocephalic  EYES:  conjunctiva and sclera clear  NECK: Supple, No JVD, Normal thyroid  CHEST/LUNG: Clear to auscultation. Clear to percussion bilaterally; No rales, rhonchi, wheezing, or rubs  HEART: Regular rate and rhythm; No murmurs, rubs, or gallops  ABDOMEN: Soft, Nontender, Nondistended; Bowel sounds present  NERVOUS SYSTEM:  Alert & Oriented X3,    EXTREMITIES:  Warm and well perfused. 2+ peripheral pulses b/l. No rest pain  SKIN: warm dry                          11.6   5.39  )-----------( 338      ( 09 Mar 2021 07:31 )             37.7     03-09    141  |  107  |  8   ----------------------------<  73  3.9   |  27  |  0.63    Ca    9.3      09 Mar 2021 07:31  Phos  3.4     03-08  Mg     2.0     03-08    TPro  6.5  /  Alb  3.1<L>  /  TBili  0.6  /  DBili  x   /  AST  6<L>  /  ALT  14  /  AlkPhos  48  03-08    LIVER FUNCTIONS - ( 08 Mar 2021 06:11 )  Alb: 3.1 g/dL / Pro: 6.5 g/dL / ALK PHOS: 48 U/L / ALT: 14 U/L DA / AST: 6 U/L / GGT: x               PT/INR - ( 09 Mar 2021 12:42 )   PT: 25.8 sec;   INR: 2.25 ratio             CAPILLARY BLOOD GLUCOSE      RADIOLOGY & ADDITIONAL TESTS:

## 2021-03-09 NOTE — CONSULT NOTE ADULT - ASSESSMENT
The patient's symptoms are NOT consistent with cervical radiculopathy or neuralgia.  The primary team should consider possible allergy or sensivity, given burning sensation followed with rash.  Please call back with questions.    mahin Babcock

## 2021-03-09 NOTE — CONSULT NOTE ADULT - SUBJECTIVE AND OBJECTIVE BOX
***TEMPLATE ONLY**      Patient is a 25y old  Female who presents with a chief complaint of Chest pain (08 Mar 2021 14:45)      HPI:  25y Female with medical history significant of Anemia, herpes presented with chest pain since morning. She states the chest pain is on left side, sharp pain, lasting for few seconds, not aggravated with exercise. It is associated with palpitations and nausea.. She was diagnosed with Herpes and received Acyclovir and IVF on Feb 23rd.  She had allergic reaction to acyclovir after 1 st dose and later stopped it. She sprained her ankle in January. She had discloacation, had closed reduction done after which she was supposed to get MRI done which she never got. She has been walking using crutches, mostly bed bound.  She is not eating well and lost 12 pounds in a month. Pt is not sexually active and has one partner. Patient denies rash, joint pain, cough, sputum production, fevers/chills/nausea/vomiting, diarrhea, abdominal pain (05 Mar 2021 18:58)         Neurological Review of Systems:  No difficulty with language.  No vision loss or double vision.  No dizziness, vertigo or new hearing loss.  No difficulty with speech or swallowing.  No focal weakness.  No focal sensory changes.  No numbness or tingling in the bilateral lower extremities.  No difficulty with balance.  No difficulty with ambulation.        MEDICATIONS  (STANDING):  apixaban 10 milliGRAM(s) Oral every 12 hours  ergocalciferol 24710 Unit(s) Oral <User Schedule>  FIRST- Mouthwash  BLM 10 milliLiter(s) Swish and Spit three times a day  pantoprazole    Tablet 40 milliGRAM(s) Oral before breakfast    MEDICATIONS  (PRN):  oxyCODONE    IR 10 milliGRAM(s) Oral every 4 hours PRN Severe Pain (7 - 10)    Allergies    No Known Allergies    Intolerances      PAST MEDICAL & SURGICAL HISTORY:  Anemia    No pertinent past medical history    No significant past surgical history      FAMILY HISTORY:    SOCIAL HISTORY: non smoker/ former smoker/ active smoker    Review of Systems:  Constitutional: No generalized weakness. No fevers or chills.                    Eyes, Ears, Mouth, Throat: No vision loss   Respiratory: No shortness of breath or cough.                                Cardiovascular: No chest pain or palpitations  Gastrointestinal: No nausea or vomiting.                                         Genitourinary: No urinary incontinence or burning on urination.  Musculoskeletal: No joint pain.                                                           Dermatologic: No rash.  Neurological: as per HPI                                                                      Psychiatric: No behavioral problems.  Endocrine: No known hypoglycemia.               Hematologic/Lymphatic: No easy bleeding.    O:  Vital Signs Last 24 Hrs  T(C): 36.8 (09 Mar 2021 07:48), Max: 37 (08 Mar 2021 11:21)  T(F): 98.3 (09 Mar 2021 07:48), Max: 98.6 (08 Mar 2021 11:21)  HR: 83 (09 Mar 2021 07:48) (71 - 85)  BP: 91/57 (09 Mar 2021 07:48) (91/57 - 119/80)  BP(mean): --  RR: 16 (09 Mar 2021 07:48) (16 - 19)  SpO2: 98% (09 Mar 2021 07:48) (97% - 99%)    General Exam:   General appearance: No acute distress                 Cardiovascular: Pedal dorsalis pulses intact bilaterally    Mental Status: Orientated to self, date and place.  Attention intact.  No dysarthria, aphasia or neglect.  Knowledge intact.  Registration intact.  Short and long term memory grossly intact.      Cranial Nerves: CN I - not tested.  PERRL, EOMI, VFF, no nystagmus or diplopia.  No APD.  Fundi not visualized.  CN V1-3 intact to light touch and pinprick.  No facial asymmetry.  Hearing intact to finger rub bilaterally.  Tongue, uvula and palate midline.  Sternocleidomastoid and Trapezius intact bilaterally.    Motor:   Tone: normal.                  Strength intact throughout  No pronator drift bilaterally                      No dysmetria on finger-nose-finger or heel-shin-heel  No truncal ataxia.  No resting, postural or action tremor.  No myoclonus.    Sensation: intact to light touch, pinprick, vibration and proprioception    Deep Tendon Reflexes: 1+ bilateral biceps, triceps, brachioradialis, knee and ankle  Toes flexor bilaterally    Gait: normal and stable.  Rhomberg -yolande.    Other:     LABS:                        11.6   5.39  )-----------( 338      ( 09 Mar 2021 07:31 )             37.7     03-09    141  |  107  |  8   ----------------------------<  73  3.9   |  27  |  0.63    Ca    9.3      09 Mar 2021 07:31  Phos  3.4     03-08  Mg     2.0     03-08    TPro  6.5  /  Alb  3.1<L>  /  TBili  0.6  /  DBili  x   /  AST  6<L>  /  ALT  14  /  AlkPhos  48  03-08            RADIOLOGY & ADDITIONAL STUDIES:    EKG: < from: 12 Lead ECG (03.05.21 @ 11:34) >  Diagnosis Line Normal sinus rhythm  Normal ECG    < end of copied text >    ID note appreciated     Patient is a 25y old  Female who presents with a chief complaint of Chest pain (08 Mar 2021 14:45)      HPI:  25y Female with medical history significant of Anemia, herpes presented with chest pain, neurology called for burning sensation.  The patient had herpes simplex and took acyclovir and IVF on Feb 23rd.  She had allergic reaction to acyclovir after 1 st dose and later stopped it. For the past few days she has been having burning sensation, no pain, on both neck to shoulders area as well as face.  The sensation is followed with hives on her skin.  There are no papules or any vescicles.  There is no neck pain.  No focal weakness.  No radicular pain.    Neurological Review of Systems:  No difficulty with language.  No vision loss or double vision.  No dizziness, vertigo or new hearing loss.  No difficulty with speech or swallowing.  No focal weakness.    No difficulty with balance.  No difficulty with ambulation.        MEDICATIONS  (STANDING):  apixaban 10 milliGRAM(s) Oral every 12 hours  ergocalciferol 85074 Unit(s) Oral <User Schedule>  FIRST- Mouthwash  BLM 10 milliLiter(s) Swish and Spit three times a day  pantoprazole    Tablet 40 milliGRAM(s) Oral before breakfast    MEDICATIONS  (PRN):  oxyCODONE    IR 10 milliGRAM(s) Oral every 4 hours PRN Severe Pain (7 - 10)    Allergies    No Known Allergies    Intolerances      PAST MEDICAL & SURGICAL HISTORY:  Anemia    No pertinent past medical history    No significant past surgical history      FAMILY HISTORY: nc parents    SOCIAL HISTORY: non smoker    Review of Systems:  Constitutional:  No fevers or chills.                    Eyes, Ears, Mouth, Throat: No vision loss   Respiratory: No cough.                                Cardiovascular: No chest pain currently  Gastrointestinal: No vomiting.                                         Genitourinary: No burning on urination.  Musculoskeletal: No joint pain.                                                           Dermatologic: No rash.  Neurological: as per HPI                                                                      Psychiatric: No behavioral problems.  Endocrine: No known hypoglycemia.               Hematologic/Lymphatic: No easy bleeding.    O:  Vital Signs Last 24 Hrs  T(C): 36.8 (09 Mar 2021 07:48), Max: 37 (08 Mar 2021 11:21)  T(F): 98.3 (09 Mar 2021 07:48), Max: 98.6 (08 Mar 2021 11:21)  HR: 83 (09 Mar 2021 07:48) (71 - 85)  BP: 91/57 (09 Mar 2021 07:48) (91/57 - 119/80)  BP(mean): --  RR: 16 (09 Mar 2021 07:48) (16 - 19)  SpO2: 98% (09 Mar 2021 07:48) (97% - 99%)    General Exam:   General appearance: No acute distress                 Cardiovascular: Pedal dorsalis pulses intact bilaterally    Mental Status: Orientated to self, date and place.  Attention intact.  No dysarthria, aphasia or neglect.  Knowledge intact.  Registration intact.  Short and long term memory grossly intact.      Cranial Nerves: CN I - not tested.  PERRL, EOMI, VFF, no nystagmus or diplopia.  No APD.  Fundi not visualized.  CN V1-3 intact to light touch and pinprick.  No facial asymmetry.  Hearing intact to finger rub bilaterally.  Tongue, uvula and palate midline.  Sternocleidomastoid and Trapezius intact bilaterally.    Motor:   Tone: normal.                  Strength intact throughout  No pronator drift bilaterally                      No dysmetria on finger-nose-finger or heel-shin-heel  No truncal ataxia.  No resting, postural or action tremor.  No myoclonus.    Sensation: intact to light touch    Deep Tendon Reflexes: 1+ bilateral biceps, triceps, brachioradialis, knee and ankle  Toes flexor bilaterally    Gait: normal and stable.      Other:     LABS:                        11.6   5.39  )-----------( 338      ( 09 Mar 2021 07:31 )             37.7     03-09    141  |  107  |  8   ----------------------------<  73  3.9   |  27  |  0.63    Ca    9.3      09 Mar 2021 07:31  Phos  3.4     03-08  Mg     2.0     03-08    TPro  6.5  /  Alb  3.1<L>  /  TBili  0.6  /  DBili  x   /  AST  6<L>  /  ALT  14  /  AlkPhos  48  03-08            RADIOLOGY & ADDITIONAL STUDIES:    EKG: < from: 12 Lead ECG (03.05.21 @ 11:34) >  Diagnosis Line Normal sinus rhythm  Normal ECG    < end of copied text >    ID note appreciated

## 2021-03-09 NOTE — PROGRESS NOTE ADULT - NSHPATTENDINGPLANDISCUSS_GEN_ALL_CORE
PATIENT AND FLOOR STAFF
surg ho
PATIENT AND FLOOR STAFF

## 2021-03-09 NOTE — CONSULT NOTE ADULT - ASSESSMENT
A:      P:   Patient evaluated and Chart reviewed   Discussed diagnosis and treatment with patient   A:  Right ankle instability s/p fall     P:   Patient evaluated and Chart reviewed   RLE MRI notes multiple bone contusions and ligamentous injury including ATFL, CFL and deltoid   Discussed diagnosis and treatment with patient including conservative vs surgical intervention  Applied Estrella compression to RLE   Recommended patient to remain NWB to RLE   Podiatry to follow while inhouse   Discussed with attending Dr. Richter    A:  Right ankle chronic instability     P:   Patient evaluated and Chart reviewed   RLE MRI notes multiple bone contusions and ligamentous injury including ATFL, CFL and deltoid   Discussed diagnosis and treatment with patient including conservative vs surgical intervention  Applied Estrella compression to RLE   Recommended passive ankle ROM to RLE and LE elevation   Recommend lace up ankle ASO, f/u Goldberg orthotics   Recommend PT consult   Podiatry to follow while inhouse   Discussed with attending Dr. Richter
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
## 2021-03-10 ENCOUNTER — TRANSCRIPTION ENCOUNTER (OUTPATIENT)
Age: 26
End: 2021-03-10

## 2021-03-10 VITALS
SYSTOLIC BLOOD PRESSURE: 99 MMHG | HEART RATE: 95 BPM | TEMPERATURE: 98 F | RESPIRATION RATE: 18 BRPM | OXYGEN SATURATION: 99 % | DIASTOLIC BLOOD PRESSURE: 64 MMHG

## 2021-03-10 LAB
ALBUMIN SERPL ELPH-MCNC: 3.6 G/DL — SIGNIFICANT CHANGE UP (ref 3.5–5)
ALP SERPL-CCNC: 56 U/L — SIGNIFICANT CHANGE UP (ref 40–120)
ALT FLD-CCNC: 20 U/L DA — SIGNIFICANT CHANGE UP (ref 10–60)
ANION GAP SERPL CALC-SCNC: 7 MMOL/L — SIGNIFICANT CHANGE UP (ref 5–17)
AST SERPL-CCNC: 11 U/L — SIGNIFICANT CHANGE UP (ref 10–40)
BILIRUB SERPL-MCNC: 1 MG/DL — SIGNIFICANT CHANGE UP (ref 0.2–1.2)
BUN SERPL-MCNC: 10 MG/DL — SIGNIFICANT CHANGE UP (ref 7–18)
CALCIUM SERPL-MCNC: 9.6 MG/DL — SIGNIFICANT CHANGE UP (ref 8.4–10.5)
CHLORIDE SERPL-SCNC: 104 MMOL/L — SIGNIFICANT CHANGE UP (ref 96–108)
CO2 SERPL-SCNC: 26 MMOL/L — SIGNIFICANT CHANGE UP (ref 22–31)
CREAT SERPL-MCNC: 0.63 MG/DL — SIGNIFICANT CHANGE UP (ref 0.5–1.3)
GLUCOSE SERPL-MCNC: 79 MG/DL — SIGNIFICANT CHANGE UP (ref 70–99)
HCT VFR BLD CALC: 39.3 % — SIGNIFICANT CHANGE UP (ref 34.5–45)
HGB BLD-MCNC: 12.3 G/DL — SIGNIFICANT CHANGE UP (ref 11.5–15.5)
HSV+VZV DNA SPEC QL NAA+PROBE: ABNORMAL
INR BLD: 1.38 RATIO — HIGH (ref 0.88–1.16)
MAGNESIUM SERPL-MCNC: 2.1 MG/DL — SIGNIFICANT CHANGE UP (ref 1.6–2.6)
MCHC RBC-ENTMCNC: 27.1 PG — SIGNIFICANT CHANGE UP (ref 27–34)
MCHC RBC-ENTMCNC: 31.3 GM/DL — LOW (ref 32–36)
MCV RBC AUTO: 86.6 FL — SIGNIFICANT CHANGE UP (ref 80–100)
NRBC # BLD: 0 /100 WBCS — SIGNIFICANT CHANGE UP (ref 0–0)
PHOSPHATE SERPL-MCNC: 3.5 MG/DL — SIGNIFICANT CHANGE UP (ref 2.5–4.5)
PLATELET # BLD AUTO: 351 K/UL — SIGNIFICANT CHANGE UP (ref 150–400)
POTASSIUM SERPL-MCNC: 3.9 MMOL/L — SIGNIFICANT CHANGE UP (ref 3.5–5.3)
POTASSIUM SERPL-SCNC: 3.9 MMOL/L — SIGNIFICANT CHANGE UP (ref 3.5–5.3)
PROT SERPL-MCNC: 7.5 G/DL — SIGNIFICANT CHANGE UP (ref 6–8.3)
PROTHROM AB SERPL-ACNC: 16.2 SEC — HIGH (ref 10.6–13.6)
RBC # BLD: 4.54 M/UL — SIGNIFICANT CHANGE UP (ref 3.8–5.2)
RBC # FLD: 14.9 % — HIGH (ref 10.3–14.5)
SODIUM SERPL-SCNC: 137 MMOL/L — SIGNIFICANT CHANGE UP (ref 135–145)
SPECIMEN SOURCE: SIGNIFICANT CHANGE UP
WBC # BLD: 5.02 K/UL — SIGNIFICANT CHANGE UP (ref 3.8–10.5)
WBC # FLD AUTO: 5.02 K/UL — SIGNIFICANT CHANGE UP (ref 3.8–10.5)

## 2021-03-10 PROCEDURE — 86038 ANTINUCLEAR ANTIBODIES: CPT

## 2021-03-10 PROCEDURE — 71045 X-RAY EXAM CHEST 1 VIEW: CPT

## 2021-03-10 PROCEDURE — 85652 RBC SED RATE AUTOMATED: CPT

## 2021-03-10 PROCEDURE — 87635 SARS-COV-2 COVID-19 AMP PRB: CPT

## 2021-03-10 PROCEDURE — 81025 URINE PREGNANCY TEST: CPT

## 2021-03-10 PROCEDURE — 73721 MRI JNT OF LWR EXTRE W/O DYE: CPT

## 2021-03-10 PROCEDURE — 97162 PT EVAL MOD COMPLEX 30 MIN: CPT

## 2021-03-10 PROCEDURE — 99285 EMERGENCY DEPT VISIT HI MDM: CPT

## 2021-03-10 PROCEDURE — 85027 COMPLETE CBC AUTOMATED: CPT

## 2021-03-10 PROCEDURE — 84702 CHORIONIC GONADOTROPIN TEST: CPT

## 2021-03-10 PROCEDURE — 83036 HEMOGLOBIN GLYCOSYLATED A1C: CPT

## 2021-03-10 PROCEDURE — 86769 SARS-COV-2 COVID-19 ANTIBODY: CPT

## 2021-03-10 PROCEDURE — 82306 VITAMIN D 25 HYDROXY: CPT

## 2021-03-10 PROCEDURE — U0005: CPT

## 2021-03-10 PROCEDURE — 85610 PROTHROMBIN TIME: CPT

## 2021-03-10 PROCEDURE — 86140 C-REACTIVE PROTEIN: CPT

## 2021-03-10 PROCEDURE — 96374 THER/PROPH/DIAG INJ IV PUSH: CPT

## 2021-03-10 PROCEDURE — 80061 LIPID PANEL: CPT

## 2021-03-10 PROCEDURE — 87389 HIV-1 AG W/HIV-1&-2 AB AG IA: CPT

## 2021-03-10 PROCEDURE — 83735 ASSAY OF MAGNESIUM: CPT

## 2021-03-10 PROCEDURE — 84484 ASSAY OF TROPONIN QUANT: CPT

## 2021-03-10 PROCEDURE — 85025 COMPLETE CBC W/AUTO DIFF WBC: CPT

## 2021-03-10 PROCEDURE — 80048 BASIC METABOLIC PNL TOTAL CA: CPT

## 2021-03-10 PROCEDURE — 93970 EXTREMITY STUDY: CPT

## 2021-03-10 PROCEDURE — 0225U NFCT DS DNA&RNA 21 SARSCOV2: CPT

## 2021-03-10 PROCEDURE — 93925 LOWER EXTREMITY STUDY: CPT

## 2021-03-10 PROCEDURE — 82746 ASSAY OF FOLIC ACID SERUM: CPT

## 2021-03-10 PROCEDURE — 36415 COLL VENOUS BLD VENIPUNCTURE: CPT

## 2021-03-10 PROCEDURE — 93306 TTE W/DOPPLER COMPLETE: CPT

## 2021-03-10 PROCEDURE — 71275 CT ANGIOGRAPHY CHEST: CPT

## 2021-03-10 PROCEDURE — 84100 ASSAY OF PHOSPHORUS: CPT

## 2021-03-10 PROCEDURE — 87798 DETECT AGENT NOS DNA AMP: CPT

## 2021-03-10 PROCEDURE — 80053 COMPREHEN METABOLIC PANEL: CPT

## 2021-03-10 PROCEDURE — 87529 HSV DNA AMP PROBE: CPT

## 2021-03-10 PROCEDURE — 73723 MRI JOINT LWR EXTR W/O&W/DYE: CPT

## 2021-03-10 PROCEDURE — 84443 ASSAY THYROID STIM HORMONE: CPT

## 2021-03-10 PROCEDURE — 85379 FIBRIN DEGRADATION QUANT: CPT

## 2021-03-10 PROCEDURE — 93005 ELECTROCARDIOGRAM TRACING: CPT

## 2021-03-10 PROCEDURE — 82607 VITAMIN B-12: CPT

## 2021-03-10 RX ORDER — ENOXAPARIN SODIUM 100 MG/ML
100 INJECTION SUBCUTANEOUS
Qty: 3000 | Refills: 0
Start: 2021-03-10 | End: 2021-04-08

## 2021-03-10 RX ORDER — OXYCODONE HYDROCHLORIDE 5 MG/1
1 TABLET ORAL
Qty: 12 | Refills: 0
Start: 2021-03-10 | End: 2021-03-12

## 2021-03-10 RX ORDER — ERGOCALCIFEROL 1.25 MG/1
1 CAPSULE ORAL
Qty: 7 | Refills: 0
Start: 2021-03-10 | End: 2021-04-27

## 2021-03-10 RX ORDER — SODIUM CHLORIDE 9 MG/ML
1000 INJECTION INTRAMUSCULAR; INTRAVENOUS; SUBCUTANEOUS ONCE
Refills: 0 | Status: COMPLETED | OUTPATIENT
Start: 2021-03-10 | End: 2021-03-10

## 2021-03-10 RX ORDER — DIPHENHYDRAMINE HYDROCHLORIDE AND LIDOCAINE HYDROCHLORIDE AND ALUMINUM HYDROXIDE AND MAGNESIUM HYDRO
1 KIT
Qty: 21 | Refills: 0
Start: 2021-03-10 | End: 2021-03-16

## 2021-03-10 RX ADMIN — SODIUM CHLORIDE 1000 MILLILITER(S): 9 INJECTION INTRAMUSCULAR; INTRAVENOUS; SUBCUTANEOUS at 14:45

## 2021-03-10 RX ADMIN — ENOXAPARIN SODIUM 70 MILLIGRAM(S): 100 INJECTION SUBCUTANEOUS at 06:33

## 2021-03-10 RX ADMIN — PANTOPRAZOLE SODIUM 40 MILLIGRAM(S): 20 TABLET, DELAYED RELEASE ORAL at 06:33

## 2021-03-10 RX ADMIN — ENOXAPARIN SODIUM 70 MILLIGRAM(S): 100 INJECTION SUBCUTANEOUS at 17:49

## 2021-03-10 NOTE — DISCHARGE NOTE PROVIDER - PROVIDER TOKENS
PROVIDER:[TOKEN:[[id number]],FOLLOWUP:[2 weeks]],PROVIDER:[TOKEN:[[id number]],FOLLOWUP:[2 weeks]]

## 2021-03-10 NOTE — DISCHARGE NOTE PROVIDER - NSDCCPCAREPLAN_GEN_ALL_CORE_FT
PRINCIPAL DISCHARGE DIAGNOSIS  Diagnosis: Pulmonary embolism  Assessment and Plan of Treatment: You were diagnosed with Pulmonary embolism( blood clots in the lungs) on the CT scan of the chest which was likely from immobilization.   You were started on blood thinner injections Lovenox as your are still breast feeding. You were seen by the Cardiologist and Hematologist.   -Your Echocardiogram of the heart was normal   - Continue taking the Lovenox injections as prescribed once a day   - Follow up with Hematologist Dr Hernandez in 1 month for further workup and recommendations      SECONDARY DISCHARGE DIAGNOSES  Diagnosis: Instability of right ankle joint  Assessment and Plan of Treatment: You have the history of Right ankle fracture. You underwent MRI scan which showed multiple bone and ligament injuries. You were seen by the Podiatrist Dr John.  - You are advised to put Ankle ASO. and keep the right leg elevated.   - You are also advised Passive  range of motion of the ankle.   - You are advised to move around and you can put weight on the right ankle as you already had blood clot in the lungs due to immobility.   - You are advised to follow up with Podiatry in 1-2 weeks for further recommendations    Diagnosis: Gingivostomatitis  Assessment and Plan of Treatment: You were diagnosed with Gingivostomatitis .You were seen by the Infectious disease doctor. You are advised Listerine mouth washes twice daily . You can also use Magic mouthwash as needed.    Diagnosis: Vitamin D deficiency  Assessment and Plan of Treatment: Your Vitamin D level is 11.  Continue with Ergocalciferol 08054 U once a week for 8 weeks,then get your Vitamin D rechecked. Then continue with daily 1000 U over the counter as maintainence dose.     PRINCIPAL DISCHARGE DIAGNOSIS  Diagnosis: Pulmonary embolism  Assessment and Plan of Treatment: You were diagnosed with Pulmonary embolism( blood clots in the lungs) on the CT scan of the chest which was likely from immobilization.   You were started on blood thinner injections Lovenox as your are still breast feeding. You were seen by the Cardiologist and Hematologist.   -Your Echocardiogram of the heart was normal   - Continue taking the Lovenox injections as prescribed once a day   - Follow up with Hematologist Dr Hernandez in 1 month for further workup and recommendations      SECONDARY DISCHARGE DIAGNOSES  Diagnosis: Vitamin D deficiency  Assessment and Plan of Treatment: Your Vitamin D level is 11.  Continue with Ergocalciferol 00902 U once a week for 8 weeks,then get your Vitamin D rechecked. Then continue with daily 1000 U over the counter as maintainence dose.    Diagnosis: Gingivostomatitis  Assessment and Plan of Treatment: You were diagnosed with Gingivostomatitis .You were seen by the Infectious disease doctor. You are advised Listerine mouth washes twice daily . You can also use Magic mouthwash as needed. You are highly recommended to consult and follow up with a Dentist upon hospital discharge    Diagnosis: Instability of right ankle joint  Assessment and Plan of Treatment: You have the history of Right ankle fracture. You underwent MRI scan which showed multiple bone and ligament injuries. You were seen by the Podiatrist Dr John.  - You are advised to put Ankle ASO. and keep the right leg elevated.   - You are also advised Passive  range of motion of the ankle.   - You are advised to move around and you can put weight on the right ankle as you already had blood clot in the lungs due to immobility.   - You are advised to follow up with Podiatry in 1-2 weeks for further recommendations

## 2021-03-10 NOTE — DISCHARGE NOTE PROVIDER - CARE PROVIDER_API CALL
Don Hernandez  INTERNAL MEDICINE  [address]  [place] [id number]  Phone: [phone]  Fax: [phone]  Follow Up Time: 2 weeks    Dm Richter (DPM)  Foot Surgery; [facility]  [address] [place] [address]  Phone: [phone]  Fax: [phone]  Follow Up Time: 2 weeks

## 2021-03-10 NOTE — PROGRESS NOTE ADULT - SUBJECTIVE AND OBJECTIVE BOX
Patient denies chest pain or shortness of breath.   Review of systems otherwise (-)  	  MEDICATIONS:  MEDICATIONS  (STANDING):  enoxaparin Injectable 70 milliGRAM(s) SubCutaneous two times a day  ergocalciferol 61486 Unit(s) Oral <User Schedule>  FIRST- Mouthwash  BLM 10 milliLiter(s) Swish and Spit three times a day  pantoprazole    Tablet 40 milliGRAM(s) Oral before breakfast      LABS:	 	    CARDIAC MARKERS:                                12.3   5.02  )-----------( 351      ( 10 Mar 2021 09:12 )             39.3     Hemoglobin: 12.3 g/dL (03-10 @ 09:12)  Hemoglobin: 11.6 g/dL (03-09 @ 07:31)  Hemoglobin: 10.3 g/dL (03-08 @ 06:11)  Hemoglobin: 11.2 g/dL (03-07 @ 07:50)  Hemoglobin: 11.6 g/dL (03-06 @ 08:40)      03-10    137  |  104  |  10  ----------------------------<  79  3.9   |  26  |  0.63    Ca    9.6      10 Mar 2021 09:12  Phos  3.5     03-10  Mg     2.1     03-10    TPro  7.5  /  Alb  3.6  /  TBili  1.0  /  DBili  x   /  AST  11  /  ALT  20  /  AlkPhos  56  03-10    Creatinine Trend: 0.63<--, 0.63<--, 0.43<--, 0.60<--, 0.62<--, 0.68<--    COAGS:   PT/INR - ( 10 Mar 2021 09:12 )   PT: 16.2 sec;   INR: 1.38 ratio        PHYSICAL EXAM:  T(C): 37.1 (03-10-21 @ 11:19), Max: 37.1 (03-10-21 @ 11:19)  HR: 85 (03-10-21 @ 11:19) (71 - 95)  BP: 97/52 (03-10-21 @ 11:19) (95/62 - 104/64)  RR: 17 (03-10-21 @ 11:19) (17 - 19)  SpO2: 98% (03-10-21 @ 11:19) (98% - 99%)  Wt(kg): --  I&O's Summary    09 Mar 2021 07:01  -  10 Mar 2021 07:00  --------------------------------------------------------  IN: 436 mL / OUT: 1 mL / NET: 435 mL      HEENT:  (-)icterus (-)pallor  CV: N S1 S2 1/6 ENA (+)2 Pulses B/l  Resp:  Clear to ausculatation B/L, normal effort  GI: (+) BS Soft, NT, ND  Lymph:  (-)Edema, (-)obvious lymphadenopathy  Skin: Warm to touch, Normal turgor  Psych: Appropriate mood and affect      TELEMETRY: 	  sinus        ASSESSMENT/PLAN: 	25y  Female dmitted with provoked PE.    - Chest pain due to pE  - Echo with normal LV and R Vfx  - Anticoagulation per medical team  - No need for further inpatient cardiac work up.    Emir Castellanos MD, [facility]  BEEPER [phone]

## 2021-03-10 NOTE — DISCHARGE NOTE NURSING/CASE MANAGEMENT/SOCIAL WORK - PATIENT PORTAL LINK FT
You can access the FollowMyHealth Patient Portal offered by [facility] by registering at the following website: [email or web address]. By joining [facility]’s FollowMyHealth portal, you will also be able to view your health information using other applications (apps) compatible with our system.

## 2021-03-10 NOTE — PROGRESS NOTE ADULT - ATTENDING COMMENTS
Seen at bedside, images reviewed. no acute podiatric intervention. stable for outpatient followup. will consider bracing/PT prior to surigical stabilization pending how she responds

## 2021-03-10 NOTE — DISCHARGE NOTE PROVIDER - NSDCMRMEDTOKEN_GEN_ALL_CORE_FT
diphenhydramine/lidocaine/aluminum hydroxide/magnesium hydroxide/simethicone mucous membrane suspension: 1 application mucous membrane 3 times a day swish and spit    ergocalciferol 50,000 intl units (1.25 mg) oral capsule: 1 cap(s) orally once a week every Sunday   Lovenox 100 mg/mL injectable solution: 100 milligram(s) subcutaneously once a day   oxyCODONE 10 mg oral tablet: 1 tab(s) orally every 6 hours, As Needed -Severe Pain (7 - 10) MDD:4 tabs/24 hrs
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
## 2021-03-10 NOTE — PHYSICAL THERAPY INITIAL EVALUATION ADULT - ACTIVE RANGE OF MOTION EXAMINATION, REHAB EVAL
Right hip and knee-WFL. Right ankle is reduced  due to bandage/james. upper extremity Active ROM was WNL (within normal limits)/LLE Active ROM was WNL (within normal limits)

## 2021-03-10 NOTE — DISCHARGE NOTE PROVIDER - HOSPITAL COURSE
24 y/o Female with medical history significant of Anemia, herpes presented with chest pain since the morning of admission. She states that she had L sided chest pain, sharp pain, lasting for few seconds, not aggravated with exercise. Pt p/w palpitations and nausea, she was diagnosed with Herpes and received Acyclovir and IVF on Feb 23rd.  She had allergic reaction to Acyclovir after 1 st dose and later stopped it. She sprained her ankle in January. She had dislocation, had closed reduction done after which she was supposed to get MRI done which she never got. She has been walking using crutches, mostly bed bound.  She is not eating well and lost 12 pounds in a month. Pt is not sexually active but has a partner.  Pt noted to have pulm embolism, Provoked on CTA chest, now on LOVENOX (cannot take Eliquis as pt is breastfeeding)  Pt also had Chronic R ankle fracture for which pt got MRI done which was noted for multiple bone contusions and ligamentous injury.Podiatry was consulted   who recommended conservative management at this time and outpatient f/u   Pt was also noted to have sinus tachycardia likely from PE , Echo was normal     Pt is stable for discharge as per Attending

## 2021-03-10 NOTE — PROGRESS NOTE ADULT - ASSESSMENT
25y Female with medical history significant of Anemia, herpes presented with chest pain since morning. She sprained her ankle in January. She had discloacation, had closed reduction done after which she was supposed to get MRI done which she never got. She has been walking using crutches, mostly bed bound.   Vascular was consulted because admitting officer noticed decreased pulses on the Rt injured foot . No rest pain ,+excellent palpable Rt PT/DP pulses and on doppler, mild edema , no paresthesia or tingling on foot . All sensation intact     plan   stable from vasc surg  standpoint  no need for CTA of the abd/pelvis for vascular evaluation  f/u w ortho   reconsult prn 
A:  Right ankle chronic instability     P:   Patient evaluated and Chart reviewed   RLE MRI notes multiple bone contusions and ligamentous injury including ATFL, CFL and deltoid   Discussed diagnosis and treatment with patient including conservative vs surgical intervention  Applied ACE compression to R ankle   Given patient's recent history of PE,  recommend passive ankle ROM to RLE and LE elevation, and weight bearing as tolerated to R foot using lace up ankle ASO and sneakers   Recommend surgical intervention in the future once patient's current condition improves   Recommend lace up ankle ASO, f/u [facility] orthotics   WB status: FWB to LLE, WBAT to RLE  Patient stable for d/c from podiatric standpoint  upon discharge, pt to follow up in [facility] [address] [facility] [phone]    Podiatry to follow while inhouse   Discussed with attending Dr. Richter   Seen and evaluated bedside with attending Dr. Campos   
· Assessment	  25 year old lady with ?herpes in mouth and sprained ankle using crutches developed chest pain and palpitation.  CTA was positive for PE.    1. PE secondary to immobility  on lovenox.   can switch to DOAC  I do not think we need to do extensive thrombophilia w/u  but can check leiden mutation and prothrombin mutation    2. oral ulcers  ?herpes  ?pemphigus vulgaris or paraneoplastics  also R/O autoimmune disease  lip ulcers are gone  still has stomatitis and gingivitis  no palpable nodes or mass  she is asking for a cytology exam to confirm HSV infection  can do a brush at tongue and make a slide to stain and immunohistochemical study
· Assessment	  25 year old lady with ?herpes in mouth and sprained ankle using crutches developed chest pain and palpitation.  CTA was positive for PE.    1. PE secondary to immobility  on lovenox.   can switch to DOAC  I do not think we need to do extensive thrombophilia w/u  but can check leiden mutation and prothrombin mutation  she is s till breast feeding.  she can use only lovenox or coumadin  she prefer lovenox, can give 1.5 mg/kg once a day.      2. oral ulcers  ?herpes  ?pemphigus vulgaris or paraneoplastics  also R/O autoimmune disease  lip ulcers are gone  still has stomatitis and gingivitis  no palpable nodes or mass  she is asking for a cytology exam to confirm HSV infection  HSV1 pcr POSITIVE  
25y Female with medical history significant of Anemia, herpes presented with chest pain since the morning of hospital admission, CT chest showed PE, and currently being managed with Eliquis 10mg BID for AC.
25y Female with medical history significant of Anemia, herpes presented with chest pain since morning.     ED:  CT chest showed PE    Patient is being admitted to [facility] for PE.
25y Female with medical history significant of Anemia, herpes presented with chest pain since the morning of hospital admission, CT chest showed PE, and currently being managed with Lovenox full dose as Eliquis cannot be given during breastfeeding.

## 2021-03-10 NOTE — DISCHARGE NOTE PROVIDER - CARE PROVIDERS DIRECT ADDRESSES
,DirectAddress_Unknown,[email or web address]

## 2021-03-10 NOTE — PHYSICAL THERAPY INITIAL EVALUATION ADULT - PERTINENT HX OF CURRENT PROBLEM, REHAB EVAL
medical history significant of Anemia, herpes presented with chest pain since morning. She states the chest pain is on left side, sharp pain, lasting for few seconds, not aggravated with exercise. It is associated with palpitations and nausea..

## 2021-03-10 NOTE — PROGRESS NOTE ADULT - SUBJECTIVE AND OBJECTIVE BOX
feel fine  no cp, no sob  oral lesion not hurting anymore    MEDICATIONS  (STANDING):  enoxaparin Injectable 70 milliGRAM(s) SubCutaneous two times a day  ergocalciferol 70644 Unit(s) Oral <User Schedule>  FIRST- Mouthwash  BLM 10 milliLiter(s) Swish and Spit three times a day  pantoprazole    Tablet 40 milliGRAM(s) Oral before breakfast    MEDICATIONS  (PRN):  oxyCODONE    IR 10 milliGRAM(s) Oral every 4 hours PRN Severe Pain (7 - 10)      Allergies    No Known Allergies    Intolerances        Vital Signs Last 24 Hrs  T(C): 37.1 (10 Mar 2021 11:19), Max: 37.1 (10 Mar 2021 11:19)  T(F): 98.7 (10 Mar 2021 11:19), Max: 98.7 (10 Mar 2021 11:19)  HR: 85 (10 Mar 2021 11:19) (71 - 95)  BP: 97/52 (10 Mar 2021 11:19) (95/62 - 104/64)  BP(mean): --  RR: 17 (10 Mar 2021 11:19) (17 - 19)  SpO2: 98% (10 Mar 2021 11:19) (98% - 99%)    PHYSICAL EXAM  General: adult in NAD  HEENT: clear oropharynx, anicteric sclera, pink conjunctiva  Neck: supple  CV: normal S1/S2 with no murmur rubs or gallops  Lungs: positive air movement b/l ant lungs,clear to auscultation, no wheezes, no rales  Abdomen: soft non-tender non-distended, no hepatosplenomegaly  Ext: no clubbing cyanosis or edema  Skin: no rashes and no petechiae  Neuro: alert and oriented X 4, no focal deficits  LABS:                          12.3   5.02  )-----------( 351      ( 10 Mar 2021 09:12 )             39.3         Mean Cell Volume : 86.6 fl  Mean Cell Hemoglobin : 27.1 pg  Mean Cell Hemoglobin Concentration : 31.3 gm/dL  Auto Neutrophil # : x  Auto Lymphocyte # : x  Auto Monocyte # : x  Auto Eosinophil # : x  Auto Basophil # : x  Auto Neutrophil % : x  Auto Lymphocyte % : x  Auto Monocyte % : x  Auto Eosinophil % : x  Auto Basophil % : x    Serial CBC  Hematocrit 39.3  Hemoglobin 12.3  Plat 351  RBC 4.54  WBC 5.02  Serial CBC  Hematocrit 37.7  Hemoglobin 11.6  Plat 338  RBC 4.37  WBC 5.39  Serial CBC  Hematocrit 33.0  Hemoglobin 10.3  Plat 307  RBC 3.77  WBC 4.84  Serial CBC  Hematocrit 35.5  Hemoglobin 11.2  Plat 341  RBC 4.15  WBC 6.20    03-10    137  |  104  |  10  ----------------------------<  79  3.9   |  26  |  0.63    Ca    9.6      10 Mar 2021 09:12  Phos  3.5     03-10  Mg     2.1     03-10    TPro  7.5  /  Alb  3.6  /  TBili  1.0  /  DBili  x   /  AST  11  /  ALT  20  /  AlkPhos  56  03-10      PT/INR - ( 10 Mar 2021 09:12 )   PT: 16.2 sec;   INR: 1.38 ratio             Folate, Serum: 15.9 ng/mL (03-06 @ 02:41)  Vitamin B12, Serum: 812 pg/mL (03-06 @ 02:41)            BLOOD SMEAR INTERPRETATION:       RADIOLOGY & ADDITIONAL STUDIES:

## 2021-03-10 NOTE — PROGRESS NOTE ADULT - PROVIDER SPECIALTY LIST ADULT
Heme/Onc
Internal Medicine
Podiatry
Cardiology
Pulmonology
Pulmonology
Vascular Surgery
Heme/Onc
Internal Medicine

## 2021-03-10 NOTE — DISCHARGE NOTE PROVIDER - NSDCCAREPROVSEEN_GEN_ALL_CORE_FT
Emir Castellanos, Hal Velasquez, Jay Jay Velasquez, Ninoska Wright, Elvie Hernandez   # PULMONARY EMBOLISM - PLACED ON LOVENOX, REVIEWED CTA CHEST, LOWER EXTREMITY DOPPLER VENOUS/ARTERIAL NEGATIVE, VASCULAR SX CONSULT PLACED, HEME/ONC CONSULT - DR. GAN, PULMONARY CONSULT - DR. WRIGHT, CARDIOLOGY CONSULT  - NOTED PATIENT IS STILL BREAST FEEDING; LAST PREGNANCY WAS 2 YEARS AGO  - COVID AB NEGATIVE; F/U ALONDRA  - MAGUI - TRACE MR  - DENIES FAMILY HX OF AUTOIMMUNE DISEASE, DENIES HX OF MISCARRIAGES, DENIES HX OF COVID, DENIES USE OF OCP ; HAS BEEN NWB AND SEDENTARY SINCE RECENT ANKLE SPRAIN  # RECENT INJURY OF RIGHT ANKLE AND WAS ASKED TO BE NWB   - MR ANKLE DEMONSTRATES - BONE CONTUSION, SUBCHONDRAL TRABECULAR FRACTURE, SMALL ANKLE JOINT EFFUSION, TEAR OF THE ANTERIOR TALOFIBULAR LIGAMENT, COMPLETE TEAR OF THE CALCANEOFIBULAR LIGAMENT, SPRAIN, HIGH GRADE TEAR OF THE DEEP TIBIOTALAR LIGAMENT AND PARTIAL THICKNESS TEAR AT THE TALOCALCANEAL LIGAMENT, HIGH-GRADE PARTIAL THICKNESS TEAR AT THE DEEP DELTOID LIGAMENT AND MODERATE PARTIAL THICKNESS TEAR AT THE SUPERFICIAL DELTOID LIGAMENT  - PODIATRY RECOMMENDATION FOR BRACE, OUTPATIENT F/U AND SURGERY [AFTER 3 MONTHS OF A/C]  # VITAMIN D DEFICIENCY - PLACED ON SUPPLEMENT  # PERIODIC NUMBESS/TINGLING VS. BURNING IN UPPER EXTREMITIES AND FACE - NEUROLOGY CONSULT IN PROGRESS - DOES NOT SUSPECT CERVIGALGIA / RADICULOPATHY  # HX OF ANEMIA  # HX OF HSV W/ ? GINGIVOSTOMATITIS, SUSPECTED SIALODENTITIS AND GEOGRAPHIC TONGUE  - ID CONSULT PLACED, MAGIC MOUTHWASH, RECOMMEND OUTPATIENT F/U WITH DENTIST    NINOSKA VELASQUEZ MD COVERING Emir Mendoza MD, Hal Velasquez, Jay Jay Velasquez, Ninoska Wright, Elvie Hernandez

## 2021-03-17 ENCOUNTER — INPATIENT (INPATIENT)
Facility: HOSPITAL | Age: 26
LOS: 3 days | Discharge: HOME | DRG: 176 | End: 2021-03-21
Attending: INTERNAL MEDICINE | Admitting: INTERNAL MEDICINE
Payer: MEDICAID

## 2021-03-17 VITALS
SYSTOLIC BLOOD PRESSURE: 115 MMHG | OXYGEN SATURATION: 98 % | RESPIRATION RATE: 18 BRPM | HEART RATE: 91 BPM | DIASTOLIC BLOOD PRESSURE: 71 MMHG | WEIGHT: 152.12 LBS | HEIGHT: 62 IN | TEMPERATURE: 99 F

## 2021-03-17 DIAGNOSIS — N39.0 URINARY TRACT INFECTION, SITE NOT SPECIFIED: ICD-10-CM

## 2021-03-17 DIAGNOSIS — R07.9 CHEST PAIN, UNSPECIFIED: ICD-10-CM

## 2021-03-17 DIAGNOSIS — I26.99 OTHER PULMONARY EMBOLISM WITHOUT ACUTE COR PULMONALE: ICD-10-CM

## 2021-03-17 PROBLEM — D64.9 ANEMIA, UNSPECIFIED: Chronic | Status: ACTIVE | Noted: 2021-03-05

## 2021-03-17 LAB
ALBUMIN SERPL ELPH-MCNC: 3.5 G/DL — SIGNIFICANT CHANGE UP (ref 3.5–5)
ALP SERPL-CCNC: 48 U/L — SIGNIFICANT CHANGE UP (ref 40–120)
ALT FLD-CCNC: 37 U/L DA — SIGNIFICANT CHANGE UP (ref 10–60)
ANION GAP SERPL CALC-SCNC: 7 MMOL/L — SIGNIFICANT CHANGE UP (ref 5–17)
APPEARANCE UR: CLEAR — SIGNIFICANT CHANGE UP
APTT BLD: 33.7 SEC — SIGNIFICANT CHANGE UP (ref 27.5–35.5)
AST SERPL-CCNC: 13 U/L — SIGNIFICANT CHANGE UP (ref 10–40)
BASOPHILS # BLD AUTO: 0.02 K/UL — SIGNIFICANT CHANGE UP (ref 0–0.2)
BASOPHILS NFR BLD AUTO: 0.4 % — SIGNIFICANT CHANGE UP (ref 0–2)
BILIRUB SERPL-MCNC: 0.6 MG/DL — SIGNIFICANT CHANGE UP (ref 0.2–1.2)
BILIRUB UR-MCNC: NEGATIVE — SIGNIFICANT CHANGE UP
BUN SERPL-MCNC: 5 MG/DL — LOW (ref 7–18)
CALCIUM SERPL-MCNC: 9.5 MG/DL — SIGNIFICANT CHANGE UP (ref 8.4–10.5)
CHLORIDE SERPL-SCNC: 106 MMOL/L — SIGNIFICANT CHANGE UP (ref 96–108)
CO2 SERPL-SCNC: 26 MMOL/L — SIGNIFICANT CHANGE UP (ref 22–31)
COLOR SPEC: YELLOW — SIGNIFICANT CHANGE UP
CREAT SERPL-MCNC: 0.59 MG/DL — SIGNIFICANT CHANGE UP (ref 0.5–1.3)
DIFF PNL FLD: ABNORMAL
EOSINOPHIL # BLD AUTO: 0.04 K/UL — SIGNIFICANT CHANGE UP (ref 0–0.5)
EOSINOPHIL NFR BLD AUTO: 0.8 % — SIGNIFICANT CHANGE UP (ref 0–6)
GLUCOSE SERPL-MCNC: 92 MG/DL — SIGNIFICANT CHANGE UP (ref 70–99)
GLUCOSE UR QL: NEGATIVE — SIGNIFICANT CHANGE UP
HCG UR QL: NEGATIVE — SIGNIFICANT CHANGE UP
HCT VFR BLD CALC: 37.3 % — SIGNIFICANT CHANGE UP (ref 34.5–45)
HGB BLD-MCNC: 11.7 G/DL — SIGNIFICANT CHANGE UP (ref 11.5–15.5)
IMM GRANULOCYTES NFR BLD AUTO: 0.2 % — SIGNIFICANT CHANGE UP (ref 0–1.5)
INR BLD: 1.14 RATIO — SIGNIFICANT CHANGE UP (ref 0.88–1.16)
KETONES UR-MCNC: NEGATIVE — SIGNIFICANT CHANGE UP
LEUKOCYTE ESTERASE UR-ACNC: ABNORMAL
LYMPHOCYTES # BLD AUTO: 1.54 K/UL — SIGNIFICANT CHANGE UP (ref 1–3.3)
LYMPHOCYTES # BLD AUTO: 31.9 % — SIGNIFICANT CHANGE UP (ref 13–44)
MCHC RBC-ENTMCNC: 27.3 PG — SIGNIFICANT CHANGE UP (ref 27–34)
MCHC RBC-ENTMCNC: 31.4 GM/DL — LOW (ref 32–36)
MCV RBC AUTO: 86.9 FL — SIGNIFICANT CHANGE UP (ref 80–100)
MONOCYTES # BLD AUTO: 0.41 K/UL — SIGNIFICANT CHANGE UP (ref 0–0.9)
MONOCYTES NFR BLD AUTO: 8.5 % — SIGNIFICANT CHANGE UP (ref 2–14)
NEUTROPHILS # BLD AUTO: 2.81 K/UL — SIGNIFICANT CHANGE UP (ref 1.8–7.4)
NEUTROPHILS NFR BLD AUTO: 58.2 % — SIGNIFICANT CHANGE UP (ref 43–77)
NITRITE UR-MCNC: NEGATIVE — SIGNIFICANT CHANGE UP
NRBC # BLD: 0 /100 WBCS — SIGNIFICANT CHANGE UP (ref 0–0)
PH UR: 5 — SIGNIFICANT CHANGE UP (ref 5–8)
PLATELET # BLD AUTO: 255 K/UL — SIGNIFICANT CHANGE UP (ref 150–400)
POTASSIUM SERPL-MCNC: 4 MMOL/L — SIGNIFICANT CHANGE UP (ref 3.5–5.3)
POTASSIUM SERPL-SCNC: 4 MMOL/L — SIGNIFICANT CHANGE UP (ref 3.5–5.3)
PROT SERPL-MCNC: 7.2 G/DL — SIGNIFICANT CHANGE UP (ref 6–8.3)
PROT UR-MCNC: NEGATIVE — SIGNIFICANT CHANGE UP
PROTHROM AB SERPL-ACNC: 13.5 SEC — SIGNIFICANT CHANGE UP (ref 10.6–13.6)
RBC # BLD: 4.29 M/UL — SIGNIFICANT CHANGE UP (ref 3.8–5.2)
RBC # FLD: 15 % — HIGH (ref 10.3–14.5)
SARS-COV-2 RNA SPEC QL NAA+PROBE: SIGNIFICANT CHANGE UP
SODIUM SERPL-SCNC: 139 MMOL/L — SIGNIFICANT CHANGE UP (ref 135–145)
SP GR SPEC: 1.01 — SIGNIFICANT CHANGE UP (ref 1.01–1.02)
TROPONIN I SERPL-MCNC: <0.015 NG/ML — SIGNIFICANT CHANGE UP (ref 0–0.04)
TROPONIN I SERPL-MCNC: <0.015 NG/ML — SIGNIFICANT CHANGE UP (ref 0–0.04)
UROBILINOGEN FLD QL: NEGATIVE — SIGNIFICANT CHANGE UP
WBC # BLD: 4.83 K/UL — SIGNIFICANT CHANGE UP (ref 3.8–10.5)
WBC # FLD AUTO: 4.83 K/UL — SIGNIFICANT CHANGE UP (ref 3.8–10.5)

## 2021-03-17 PROCEDURE — 93010 ELECTROCARDIOGRAM REPORT: CPT

## 2021-03-17 PROCEDURE — 71275 CT ANGIOGRAPHY CHEST: CPT | Mod: 26

## 2021-03-17 PROCEDURE — 99285 EMERGENCY DEPT VISIT HI MDM: CPT

## 2021-03-17 PROCEDURE — 71046 X-RAY EXAM CHEST 2 VIEWS: CPT | Mod: 26

## 2021-03-17 RX ORDER — CEFTRIAXONE 500 MG/1
1000 INJECTION, POWDER, FOR SOLUTION INTRAMUSCULAR; INTRAVENOUS ONCE
Refills: 0 | Status: COMPLETED | OUTPATIENT
Start: 2021-03-17 | End: 2021-03-17

## 2021-03-17 RX ORDER — KETOROLAC TROMETHAMINE 30 MG/ML
15 SYRINGE (ML) INJECTION EVERY 8 HOURS
Refills: 0 | Status: DISCONTINUED | OUTPATIENT
Start: 2021-03-17 | End: 2021-03-21

## 2021-03-17 RX ORDER — CEFTRIAXONE 500 MG/1
1000 INJECTION, POWDER, FOR SOLUTION INTRAMUSCULAR; INTRAVENOUS EVERY 24 HOURS
Refills: 0 | Status: DISCONTINUED | OUTPATIENT
Start: 2021-03-18 | End: 2021-03-20

## 2021-03-17 RX ORDER — SODIUM CHLORIDE 9 MG/ML
1000 INJECTION INTRAMUSCULAR; INTRAVENOUS; SUBCUTANEOUS ONCE
Refills: 0 | Status: COMPLETED | OUTPATIENT
Start: 2021-03-17 | End: 2021-03-17

## 2021-03-17 RX ORDER — ENOXAPARIN SODIUM 100 MG/ML
100 INJECTION SUBCUTANEOUS DAILY
Refills: 0 | Status: DISCONTINUED | OUTPATIENT
Start: 2021-03-17 | End: 2021-03-21

## 2021-03-17 RX ORDER — ACETAMINOPHEN 500 MG
650 TABLET ORAL EVERY 6 HOURS
Refills: 0 | Status: DISCONTINUED | OUTPATIENT
Start: 2021-03-17 | End: 2021-03-21

## 2021-03-17 RX ORDER — CEFTRIAXONE 500 MG/1
INJECTION, POWDER, FOR SOLUTION INTRAMUSCULAR; INTRAVENOUS
Refills: 0 | Status: DISCONTINUED | OUTPATIENT
Start: 2021-03-17 | End: 2021-03-20

## 2021-03-17 RX ADMIN — SODIUM CHLORIDE 1000 MILLILITER(S): 9 INJECTION INTRAMUSCULAR; INTRAVENOUS; SUBCUTANEOUS at 22:30

## 2021-03-17 NOTE — ED ADULT NURSE NOTE - OBJECTIVE STATEMENT
pt is here for medical evaluation.  pt stated that "blood clot before", wants to following up, denied chest pain or sob, denied N/V/D or cough, no distress noted at this time.

## 2021-03-17 NOTE — ED PROVIDER NOTE - CLINICAL SUMMARY MEDICAL DECISION MAKING FREE TEXT BOX
Discuss case with Dr. Wright who is the pulmonologist caring for the patient and recommended admission to further evaluation cause of chest pain.

## 2021-03-17 NOTE — ED ADULT TRIAGE NOTE - CHIEF COMPLAINT QUOTE
pt stated that she has dizziness that comes and goes and left side chest pain at 6am when she was reaching to charge her phone, denies feeling any dizziness or chest pain at this time.

## 2021-03-17 NOTE — ED ADULT NURSE REASSESSMENT NOTE - NS ED NURSE REASSESS COMMENT FT1
Pt resting in bed, A&Ox3, ambulatory. Pt admitted tele service, awaiting floor bed.  No acute distress noted, denies chest pain, no shortness of breath indicated.

## 2021-03-17 NOTE — H&P ADULT - ATTENDING COMMENTS
PATIENT SEEN AND EXAMINED. CASE D/W ER MD AND FLOOR TEAM. ABOVE ROS/VS/PE REVIEWED AND VERIFIED.    HPI:    25y Female with PMH of Anemia, herpes presented with chest pain since morning. Pt reports sudden onset, 5/10, sharp left sided chest pain since 5 AM. Pt was recently admitted to [facility] for Pulmonary embolism and was discharged on Lovenox. Pt denies any fever, palpitations, shortness of breath, N/V/D, abdominal pain, urinary symptoms or any other acute complaints.    In ED, /62, HR 82    # REPORTS EPISODE OF LIGHTHEADEDNESS - RESOLVED - F/U ORTHOSTATIC VITALS, PLACED ON IVF  # PULMONARY EMBOLISM RECENTLY  PLACED ON LOVENOX NOW W/ SUSPECTED PLEURITIC CHEST PAIN - REVIEWED REPEAT CTA CHEST, EKG REVIEWED  - NOTED PATIENT IS STILL BREAST FEEDING; LAST PREGNANCY WAS 2 YEARS AGO  - COVID AB NEGATIVE RECENTLY  - MAGUI - TRACE MR  - DENIES FAMILY HX OF AUTOIMMUNE DISEASE, DENIES HX OF MISCARRIAGES, DENIES HX OF COVID, DENIES USE OF OCP ; HAS BEEN NWB AND SEDENTARY SINCE RECENT ANKLE SPRAIN  # UTI - PLACED ON ROCEPHIN, F/U UCX  # RECENT INJURY OF RIGHT ANKLE :  - MR ANKLE DEMONSTRATES - BONE CONTUSION, SUBCHONDRAL TRABECULAR FRACTURE, SMALL ANKLE JOINT EFFUSION, TEAR OF THE ANTERIOR TALOFIBULAR LIGAMENT, COMPLETE TEAR OF THE CALCANEOFIBULAR LIGAMENT, SPRAIN, HIGH GRADE TEAR OF THE DEEP TIBIOTALAR LIGAMENT AND PARTIAL THICKNESS TEAR AT THE TALOCALCANEAL LIGAMENT, HIGH-GRADE PARTIAL THICKNESS TEAR AT THE DEEP DELTOID LIGAMENT AND MODERATE PARTIAL THICKNESS TEAR AT THE SUPERFICIAL DELTOID LIGAMENT  - PODIATRY IS FOLLOWING AS OUTPATIENT - PLAN FOR SURGERY ONCE PATIENT CAN BE OFF OF LOVENOX  # VITAMIN D DEFICIENCY - ON SUPPLEMENT  # HX OF ANEMIA  # HX OF HSV W/  GINGIVOSTOMATITIS,  GEOGRAPHIC TONGUE  - MAGIC MOUTHWASH, RECOMMEND OUTPATIENT F/U WITH DENTIST  # GI PPX ; ON LOVENOX    CARLOS MERCHANT MD COVERING BLAS MERCHANT MD

## 2021-03-17 NOTE — ED ADULT NURSE NOTE - ED STAT RN HANDOFF DETAILS 3
pt.remained staBLE.denies pain. on tele box O with NSR. transfer to holding area.report given to kelvin sood.not in distress

## 2021-03-17 NOTE — H&P ADULT - PROBLEM SELECTOR PLAN 1
p/w Chest pain likely due to Pulmonary embolism  CXR clear  Troponin negative  EKG NSR   Tele monitoring  c/w lovenox 100 mg sc daily  f/u serial troponin  f/u CT angio chest p/w Chest pain likely due to Pulmonary embolism  CXR clear  Troponin negative  EKG NSR   Tele monitoring  c/w lovenox 100 mg sc daily  Pain medications as needed  f/u serial troponin  f/u CT angio chest

## 2021-03-17 NOTE — H&P ADULT - HISTORY OF PRESENT ILLNESS
25y Female with PMH of Anemia, herpes presented with chest pain since morning. Pt was recently admitted to [facility] for Pulmonary embolism and was discharged on Lovenox. Pt denies any fever, palpitations, shortness of breath, N/V/D, abdominal pain, urinary symptoms or any other acute complaints.    In ED, /62, HR 82 25y Female with PMH of Anemia, herpes presented with chest pain since morning. Pt reports sudden onset, 5/10, sharp left sided chest pain since 5 AM. Pt was recently admitted to [facility] for Pulmonary embolism and was discharged on Lovenox. Pt denies any fever, palpitations, shortness of breath, N/V/D, abdominal pain, urinary symptoms or any other acute complaints.    In ED, /62, HR 82

## 2021-03-17 NOTE — H&P ADULT - PROBLEM SELECTOR PLAN 2
Pt was recently diagnosed with PE likely from immobilization and was sent home on Lovenox  c/w Lovenox 100 sc daily  Echo in march 2021 was normal Pt was recently diagnosed with PE likely from immobilization and was sent home on Lovenox  c/w Lovenox 100 sc daily  Echo in march 2021 was normal  Vitals stable

## 2021-03-17 NOTE — ED PROVIDER NOTE - OBJECTIVE STATEMENT
26 y/o F pt with a PMH of known PE diagnosed on 3/5/21 after ankle fracture on Lovenox (because she is breast feeding), presents to the ED with complaints of chest pain that occurred for 5mins today. Patient denies fever, chills, difficulty breathing or any other complaints.

## 2021-03-17 NOTE — ED ADULT NURSE NOTE - COVID-19 RESULT
Called patient and relayed below result note. He verbalized understanding. No questions or concerns voiced. NEGATIVE

## 2021-03-17 NOTE — H&P ADULT - ASSESSMENT
25y Female with PMH of Anemia, herpes presented with chest pain since morning.     In ED, /62, HR 82    Pt is admitted for chest pain likely due to previous Pulmonary embolism

## 2021-03-17 NOTE — ED ADULT NURSE NOTE - ED STAT RN HANDOFF DETAILS 2
Report given to AYLIN Mascorro. Pt resting in bed, no acute distress noted, denies chest pain, no shortness of breath indicated.

## 2021-03-18 LAB
A1C WITH ESTIMATED AVERAGE GLUCOSE RESULT: 5.3 % — SIGNIFICANT CHANGE UP (ref 4–5.6)
ALBUMIN SERPL ELPH-MCNC: 3.1 G/DL — LOW (ref 3.5–5)
ALP SERPL-CCNC: 42 U/L — SIGNIFICANT CHANGE UP (ref 40–120)
ALT FLD-CCNC: 30 U/L DA — SIGNIFICANT CHANGE UP (ref 10–60)
ANION GAP SERPL CALC-SCNC: 7 MMOL/L — SIGNIFICANT CHANGE UP (ref 5–17)
AST SERPL-CCNC: 9 U/L — LOW (ref 10–40)
BASOPHILS # BLD AUTO: 0.02 K/UL — SIGNIFICANT CHANGE UP (ref 0–0.2)
BASOPHILS NFR BLD AUTO: 0.4 % — SIGNIFICANT CHANGE UP (ref 0–2)
BILIRUB SERPL-MCNC: 0.8 MG/DL — SIGNIFICANT CHANGE UP (ref 0.2–1.2)
BUN SERPL-MCNC: 6 MG/DL — LOW (ref 7–18)
CALCIUM SERPL-MCNC: 9.1 MG/DL — SIGNIFICANT CHANGE UP (ref 8.4–10.5)
CHLORIDE SERPL-SCNC: 109 MMOL/L — HIGH (ref 96–108)
CHOLEST SERPL-MCNC: 109 MG/DL — SIGNIFICANT CHANGE UP
CO2 SERPL-SCNC: 24 MMOL/L — SIGNIFICANT CHANGE UP (ref 22–31)
COVID-19 SPIKE DOMAIN AB INTERP: NEGATIVE — SIGNIFICANT CHANGE UP
COVID-19 SPIKE DOMAIN ANTIBODY RESULT: 0.4 U/ML — SIGNIFICANT CHANGE UP
CREAT SERPL-MCNC: 0.62 MG/DL — SIGNIFICANT CHANGE UP (ref 0.5–1.3)
EOSINOPHIL # BLD AUTO: 0.07 K/UL — SIGNIFICANT CHANGE UP (ref 0–0.5)
EOSINOPHIL NFR BLD AUTO: 1.5 % — SIGNIFICANT CHANGE UP (ref 0–6)
ESTIMATED AVERAGE GLUCOSE: 105 MG/DL — SIGNIFICANT CHANGE UP (ref 68–114)
GLUCOSE SERPL-MCNC: 78 MG/DL — SIGNIFICANT CHANGE UP (ref 70–99)
HCT VFR BLD CALC: 33.9 % — LOW (ref 34.5–45)
HDLC SERPL-MCNC: 41 MG/DL — LOW
HGB BLD-MCNC: 10.8 G/DL — LOW (ref 11.5–15.5)
IMM GRANULOCYTES NFR BLD AUTO: 0.4 % — SIGNIFICANT CHANGE UP (ref 0–1.5)
LIPID PNL WITH DIRECT LDL SERPL: 50 MG/DL — SIGNIFICANT CHANGE UP
LYMPHOCYTES # BLD AUTO: 1.47 K/UL — SIGNIFICANT CHANGE UP (ref 1–3.3)
LYMPHOCYTES # BLD AUTO: 32.1 % — SIGNIFICANT CHANGE UP (ref 13–44)
MAGNESIUM SERPL-MCNC: 2.1 MG/DL — SIGNIFICANT CHANGE UP (ref 1.6–2.6)
MCHC RBC-ENTMCNC: 27.6 PG — SIGNIFICANT CHANGE UP (ref 27–34)
MCHC RBC-ENTMCNC: 31.9 GM/DL — LOW (ref 32–36)
MCV RBC AUTO: 86.5 FL — SIGNIFICANT CHANGE UP (ref 80–100)
MONOCYTES # BLD AUTO: 0.45 K/UL — SIGNIFICANT CHANGE UP (ref 0–0.9)
MONOCYTES NFR BLD AUTO: 9.8 % — SIGNIFICANT CHANGE UP (ref 2–14)
NEUTROPHILS # BLD AUTO: 2.55 K/UL — SIGNIFICANT CHANGE UP (ref 1.8–7.4)
NEUTROPHILS NFR BLD AUTO: 55.8 % — SIGNIFICANT CHANGE UP (ref 43–77)
NON HDL CHOLESTEROL: 68 MG/DL — SIGNIFICANT CHANGE UP
NRBC # BLD: 0 /100 WBCS — SIGNIFICANT CHANGE UP (ref 0–0)
PHOSPHATE SERPL-MCNC: 3.6 MG/DL — SIGNIFICANT CHANGE UP (ref 2.5–4.5)
PLATELET # BLD AUTO: 232 K/UL — SIGNIFICANT CHANGE UP (ref 150–400)
POTASSIUM SERPL-MCNC: 3.5 MMOL/L — SIGNIFICANT CHANGE UP (ref 3.5–5.3)
POTASSIUM SERPL-SCNC: 3.5 MMOL/L — SIGNIFICANT CHANGE UP (ref 3.5–5.3)
PROT SERPL-MCNC: 6.1 G/DL — SIGNIFICANT CHANGE UP (ref 6–8.3)
RBC # BLD: 3.92 M/UL — SIGNIFICANT CHANGE UP (ref 3.8–5.2)
RBC # FLD: 15 % — HIGH (ref 10.3–14.5)
SARS-COV-2 IGG+IGM SERPL QL IA: 0.4 U/ML — SIGNIFICANT CHANGE UP
SARS-COV-2 IGG+IGM SERPL QL IA: NEGATIVE — SIGNIFICANT CHANGE UP
SODIUM SERPL-SCNC: 140 MMOL/L — SIGNIFICANT CHANGE UP (ref 135–145)
TRIGL SERPL-MCNC: 88 MG/DL — SIGNIFICANT CHANGE UP
WBC # BLD: 4.58 K/UL — SIGNIFICANT CHANGE UP (ref 3.8–10.5)
WBC # FLD AUTO: 4.58 K/UL — SIGNIFICANT CHANGE UP (ref 3.8–10.5)

## 2021-03-18 RX ORDER — SODIUM CHLORIDE 9 MG/ML
1000 INJECTION INTRAMUSCULAR; INTRAVENOUS; SUBCUTANEOUS ONCE
Refills: 0 | Status: COMPLETED | OUTPATIENT
Start: 2021-03-18 | End: 2021-03-18

## 2021-03-18 RX ORDER — SODIUM CHLORIDE 9 MG/ML
1000 INJECTION INTRAMUSCULAR; INTRAVENOUS; SUBCUTANEOUS
Refills: 0 | Status: DISCONTINUED | OUTPATIENT
Start: 2021-03-18 | End: 2021-03-21

## 2021-03-18 RX ADMIN — SODIUM CHLORIDE 1000 MILLILITER(S): 9 INJECTION INTRAMUSCULAR; INTRAVENOUS; SUBCUTANEOUS at 10:24

## 2021-03-18 RX ADMIN — SODIUM CHLORIDE 100 MILLILITER(S): 9 INJECTION INTRAMUSCULAR; INTRAVENOUS; SUBCUTANEOUS at 22:09

## 2021-03-18 RX ADMIN — ENOXAPARIN SODIUM 100 MILLIGRAM(S): 100 INJECTION SUBCUTANEOUS at 12:17

## 2021-03-18 NOTE — PROGRESS NOTE ADULT - SUBJECTIVE AND OBJECTIVE BOX
Patient is a 25y old  Female who presents with a chief complaint of dizziness / transient chest pain.    PATIENT IS SEEN AND EXAMINED IN MEDICAL FLOOR. PATIENT REPORTS IMPROVEMENT IN SYMPTOMS    ALLERGIES:  acyclovir (Swelling)    VITALS:    Vital Signs Last 24 Hrs  T(C): 36.7 (18 Mar 2021 07:30), Max: 37.7 (17 Mar 2021 19:15)  T(F): 98 (18 Mar 2021 07:30), Max: 99.8 (17 Mar 2021 19:15)  HR: 82 (18 Mar 2021 07:30) (74 - 94)  BP: 92/54 (18 Mar 2021 07:30) (92/54 - 116/71)  BP(mean): --  RR: 18 (18 Mar 2021 07:30) (18 - 18)  SpO2: 99% (18 Mar 2021 07:30) (98% - 100%)    LABS:    CBC Full  -  ( 18 Mar 2021 07:48 )  WBC Count : 4.58 K/uL  RBC Count : 3.92 M/uL  Hemoglobin : 10.8 g/dL  Hematocrit : 33.9 %  Platelet Count - Automated : 232 K/uL  Mean Cell Volume : 86.5 fl  Mean Cell Hemoglobin : 27.6 pg  Mean Cell Hemoglobin Concentration : 31.9 gm/dL  Auto Neutrophil # : 2.55 K/uL  Auto Lymphocyte # : 1.47 K/uL  Auto Monocyte # : 0.45 K/uL  Auto Eosinophil # : 0.07 K/uL  Auto Basophil # : 0.02 K/uL  Auto Neutrophil % : 55.8 %  Auto Lymphocyte % : 32.1 %  Auto Monocyte % : 9.8 %  Auto Eosinophil % : 1.5 %  Auto Basophil % : 0.4 %    PT/INR - ( 17 Mar 2021 09:25 )   PT: 13.5 sec;   INR: 1.14 ratio         PTT - ( 17 Mar 2021 09:25 )  PTT:33.7 sec  03-18    140  |  109<H>  |  6<L>  ----------------------------<  78  3.5   |  24  |  0.62    Ca    9.1      18 Mar 2021 07:47  Phos  3.6     03-18  Mg     2.1     03-18    TPro  6.1  /  Alb  3.1<L>  /  TBili  0.8  /  DBili  x   /  AST  9<L>  /  ALT  30  /  AlkPhos  42  03-18    CAPILLARY BLOOD GLUCOSE        CARDIAC MARKERS ( 17 Mar 2021 17:49 )  <0.015 ng/mL / x     / x     / x     / x      CARDIAC MARKERS ( 17 Mar 2021 09:25 )  <0.015 ng/mL / x     / x     / x     / x          LIVER FUNCTIONS - ( 18 Mar 2021 07:47 )  Alb: 3.1 g/dL / Pro: 6.1 g/dL / ALK PHOS: 42 U/L / ALT: 30 U/L DA / AST: 9 U/L / GGT: x           Creatinine Trend: 0.62<--, 0.59<--, 0.63<--, 0.63<--, 0.43<--, 0.60<--  I&O's Summary    MEDICATIONS:    MEDICATIONS  (STANDING):  cefTRIAXone   IVPB      cefTRIAXone   IVPB 1000 milliGRAM(s) IV Intermittent every 24 hours  enoxaparin Injectable 100 milliGRAM(s) SubCutaneous daily  sodium chloride 0.9% Bolus 1000 milliLiter(s) IV Bolus once      MEDICATIONS  (PRN):  acetaminophen   Tablet .. 650 milliGRAM(s) Oral every 6 hours PRN Mild Pain (1 - 3)  ketorolac   Injectable 15 milliGRAM(s) IV Push every 8 hours PRN Moderate Pain (4 - 6)      REVIEW OF SYSTEMS:                           ALL ROS DONE [ X   ]    CONSTITUTIONAL:  LETHARGIC [   ], FEVER [   ], UNRESPONSIVE [   ]  CVS:  CP  [   ], SOB, [   ], PALPITATIONS [   ], DIZZYNESS [   ]  RS: COUGH [   ], SPUTUM [   ]  GI: ABDOMINAL PAIN [   ], NAUSEA [   ], VOMITINGS [   ], DIARRHEA [   ], CONSTIPATION [   ]  :  DYSURIA [   ], NOCTURIA [   ], INCREASED FREQUENCY [   ], DRIBLING [   ],  SKELETAL: PAINFUL JOINTS [   ], SWOLLEN JOINTS [   ], NECK ACHE [   ], LOW BACK ACHE [   ],  SKIN : ULCERS [   ], RASH [   ], ITCHING [   ]  CNS: HEAD ACHE [   ], DOUBLE VISION [   ], BLURRED VISION [   ], AMS / CONFUSION [   ], SEIZURES [   ], WEAKNESS [   ],TINGLING / NUMBNESS [   ]    PHYSICAL EXAMINATION:  GENERAL APPEARANCE: NO DISTRESS  HEENT:  NO PALLOR, NO  JVD,  NO   NODES, NECK SUPPLE  CVS: S1 +, S2 +,   RS: AEEB,  OCCASIONAL  RALES +,   NO RONCHI  ABD: SOFT, NT, NO, BS +  EXT: NO PE  SKIN: WARM,     SKELETAL:  ROM ACCEPTABLE     R ANKLE WRAPPED W/ COMPRESSION ACE WRAPPING  CNS:  AAO X 3 ,   DEFICITS    RADIOLOGY :    EXAM:  CT ANGIO CHEST (W)AW IC                            PROCEDURE DATE:  03/17/2021          INTERPRETATION:  CLINICAL INFORMATION: Chest pain with history of pulmonary embolism    COMPARISON: 3/5/2021    CONTRAST/COMPLICATIONS:  IV Contrast: Omnipaque 350 / 50 cc administered / 50 cc discarded.  Oral Contrast: NONE  Complications: None reported at time of study completion    PROCEDURE:  CT Angiography of the Chest.  Sagittal and coronal reformats were performed as well as 3D (MIP) reconstructions. Study is limited by suboptimal bolus timing.    FINDINGS:    LUNGS AND AIRWAYS: Patent central airways.  Lungs are clear.  PLEURA: No pleural effusion.  MEDIASTINUM AND ARVIND: No lymphadenopathy.  VESSELS: Within normal limits.  HEART: Heart size is normal. No pericardial effusion.  CHEST WALL AND LOWER NECK: Within normal limits.  VISUALIZED UPPER ABDOMEN: Within normal limits.  BONES: Within normal limits.    IMPRESSION:  Limited by bolus timing. No central pulmonary embolism seen. Cannot evaluate previously seen right segmental and subsegmental branches.    Acute pathology.    ==========================================================      ASSESSMENT :     Other pulmonary embolism without acute cor pulmonale    Pulmonary embolism    Anemia    No pertinent past medical history    No significant past surgical history        PLAN:  HPI:  25y Female with PMH of Anemia, herpes presented with chest pain since morning. Pt reports sudden onset, 5/10, sharp left sided chest pain since 5 AM. Pt was recently admitted to [facility] for Pulmonary embolism and was discharged on Lovenox. Pt denies any fever, palpitations, shortness of breath, N/V/D, abdominal pain, urinary symptoms or any other acute complaints.    In ED, /62, HR 82 (17 Mar 2021 15:00)    # REPORTS EPISODE OF LIGHTHEADEDNESS - RESOLVED - F/U ORTHOSTATIC VITALS, PLACED ON IVF  # PULMONARY EMBOLISM RECENTLY  PLACED ON LOVENOX NOW W/ SUSPECTED PLEURITIC CHEST PAIN - REVIEWED REPEAT CTA CHEST, EKG REVIEWED  - NOTED PATIENT IS STILL BREAST FEEDING; LAST PREGNANCY WAS 2 YEARS AGO  - COVID AB NEGATIVE RECENTLY  - MAGUI - TRACE MR  - DENIES FAMILY HX OF AUTOIMMUNE DISEASE, DENIES HX OF MISCARRIAGES, DENIES HX OF COVID, DENIES USE OF OCP ; HAS BEEN NWB AND SEDENTARY SINCE RECENT ANKLE SPRAIN  # UTI - PLACED ON ROCEPHIN, F/U UCX  # RECENT INJURY OF RIGHT ANKLE :  - MR ANKLE DEMONSTRATES - BONE CONTUSION, SUBCHONDRAL TRABECULAR FRACTURE, SMALL ANKLE JOINT EFFUSION, TEAR OF THE ANTERIOR TALOFIBULAR LIGAMENT, COMPLETE TEAR OF THE CALCANEOFIBULAR LIGAMENT, SPRAIN, HIGH GRADE TEAR OF THE DEEP TIBIOTALAR LIGAMENT AND PARTIAL THICKNESS TEAR AT THE TALOCALCANEAL LIGAMENT, HIGH-GRADE PARTIAL THICKNESS TEAR AT THE DEEP DELTOID LIGAMENT AND MODERATE PARTIAL THICKNESS TEAR AT THE SUPERFICIAL DELTOID LIGAMENT  - PODIATRY IS FOLLOWING AS OUTPATIENT - PLAN FOR SURGERY ONCE PATIENT CAN BE OFF OF LOVENOX  # VITAMIN D DEFICIENCY - ON SUPPLEMENT  # HX OF ANEMIA  # HX OF HSV W/  GINGIVOSTOMATITIS,  GEOGRAPHIC TONGUE  - MAGIC MOUTHWASH, RECOMMEND OUTPATIENT F/U WITH DENTIST  # GI PPX ; ON LOVENOX    CARLOS MERCHANT MD COVERING BLAS MERCHANT MD Patient is a 25y old  Female who presents with a chief complaint of dizziness / transient chest pain.    PATIENT IS SEEN AND EXAMINED IN MEDICAL FLOOR. PATIENT REPORTS IMPROVEMENT IN SYMPTOMS    ALLERGIES:  acyclovir (Swelling)    VITALS:    Vital Signs Last 24 Hrs  T(C): 36.7 (18 Mar 2021 07:30), Max: 37.7 (17 Mar 2021 19:15)  T(F): 98 (18 Mar 2021 07:30), Max: 99.8 (17 Mar 2021 19:15)  HR: 82 (18 Mar 2021 07:30) (74 - 94)  BP: 92/54 (18 Mar 2021 07:30) (92/54 - 116/71)  BP(mean): --  RR: 18 (18 Mar 2021 07:30) (18 - 18)  SpO2: 99% (18 Mar 2021 07:30) (98% - 100%)    LABS:    CBC Full  -  ( 18 Mar 2021 07:48 )  WBC Count : 4.58 K/uL  RBC Count : 3.92 M/uL  Hemoglobin : 10.8 g/dL  Hematocrit : 33.9 %  Platelet Count - Automated : 232 K/uL  Mean Cell Volume : 86.5 fl  Mean Cell Hemoglobin : 27.6 pg  Mean Cell Hemoglobin Concentration : 31.9 gm/dL  Auto Neutrophil # : 2.55 K/uL  Auto Lymphocyte # : 1.47 K/uL  Auto Monocyte # : 0.45 K/uL  Auto Eosinophil # : 0.07 K/uL  Auto Basophil # : 0.02 K/uL  Auto Neutrophil % : 55.8 %  Auto Lymphocyte % : 32.1 %  Auto Monocyte % : 9.8 %  Auto Eosinophil % : 1.5 %  Auto Basophil % : 0.4 %    PT/INR - ( 17 Mar 2021 09:25 )   PT: 13.5 sec;   INR: 1.14 ratio         PTT - ( 17 Mar 2021 09:25 )  PTT:33.7 sec  03-18    140  |  109<H>  |  6<L>  ----------------------------<  78  3.5   |  24  |  0.62    Ca    9.1      18 Mar 2021 07:47  Phos  3.6     03-18  Mg     2.1     03-18    TPro  6.1  /  Alb  3.1<L>  /  TBili  0.8  /  DBili  x   /  AST  9<L>  /  ALT  30  /  AlkPhos  42  03-18    CAPILLARY BLOOD GLUCOSE        CARDIAC MARKERS ( 17 Mar 2021 17:49 )  <0.015 ng/mL / x     / x     / x     / x      CARDIAC MARKERS ( 17 Mar 2021 09:25 )  <0.015 ng/mL / x     / x     / x     / x          LIVER FUNCTIONS - ( 18 Mar 2021 07:47 )  Alb: 3.1 g/dL / Pro: 6.1 g/dL / ALK PHOS: 42 U/L / ALT: 30 U/L DA / AST: 9 U/L / GGT: x           Creatinine Trend: 0.62<--, 0.59<--, 0.63<--, 0.63<--, 0.43<--, 0.60<--  I&O's Summary    MEDICATIONS:    MEDICATIONS  (STANDING):  cefTRIAXone   IVPB      cefTRIAXone   IVPB 1000 milliGRAM(s) IV Intermittent every 24 hours  enoxaparin Injectable 100 milliGRAM(s) SubCutaneous daily  sodium chloride 0.9% Bolus 1000 milliLiter(s) IV Bolus once      MEDICATIONS  (PRN):  acetaminophen   Tablet .. 650 milliGRAM(s) Oral every 6 hours PRN Mild Pain (1 - 3)  ketorolac   Injectable 15 milliGRAM(s) IV Push every 8 hours PRN Moderate Pain (4 - 6)      REVIEW OF SYSTEMS:                           ALL ROS DONE [ X   ]    CONSTITUTIONAL:  LETHARGIC [   ], FEVER [   ], UNRESPONSIVE [   ]  CVS:  CP  [   ], SOB, [   ], PALPITATIONS [   ], DIZZYNESS [   ]  RS: COUGH [   ], SPUTUM [   ]  GI: ABDOMINAL PAIN [   ], NAUSEA [   ], VOMITINGS [   ], DIARRHEA [   ], CONSTIPATION [   ]  :  DYSURIA [   ], NOCTURIA [   ], INCREASED FREQUENCY [   ], DRIBLING [   ],  SKELETAL: PAINFUL JOINTS [   ], SWOLLEN JOINTS [   ], NECK ACHE [   ], LOW BACK ACHE [   ],  SKIN : ULCERS [   ], RASH [   ], ITCHING [   ]  CNS: HEAD ACHE [   ], DOUBLE VISION [   ], BLURRED VISION [   ], AMS / CONFUSION [   ], SEIZURES [   ], WEAKNESS [   ],TINGLING / NUMBNESS [   ]    PHYSICAL EXAMINATION:  GENERAL APPEARANCE: NO DISTRESS  HEENT:  NO PALLOR, NO  JVD,  NO   NODES, NECK SUPPLE  CVS: S1 +, S2 +,   RS: AEEB,  OCCASIONAL  RALES +,   NO RONCHI  ABD: SOFT, NT, NO, BS +  EXT: NO PE  SKIN: WARM,     SKELETAL:  ROM ACCEPTABLE     R ANKLE WRAPPED W/ COMPRESSION ACE WRAPPING  CNS:  AAO X 3 ,   DEFICITS    RADIOLOGY :    EXAM:  CT ANGIO CHEST (W)AW IC                            PROCEDURE DATE:  03/17/2021          INTERPRETATION:  CLINICAL INFORMATION: Chest pain with history of pulmonary embolism    COMPARISON: 3/5/2021    CONTRAST/COMPLICATIONS:  IV Contrast: Omnipaque 350 / 50 cc administered / 50 cc discarded.  Oral Contrast: NONE  Complications: None reported at time of study completion    PROCEDURE:  CT Angiography of the Chest.  Sagittal and coronal reformats were performed as well as 3D (MIP) reconstructions. Study is limited by suboptimal bolus timing.    FINDINGS:    LUNGS AND AIRWAYS: Patent central airways.  Lungs are clear.  PLEURA: No pleural effusion.  MEDIASTINUM AND ARVIND: No lymphadenopathy.  VESSELS: Within normal limits.  HEART: Heart size is normal. No pericardial effusion.  CHEST WALL AND LOWER NECK: Within normal limits.  VISUALIZED UPPER ABDOMEN: Within normal limits.  BONES: Within normal limits.    IMPRESSION:  Limited by bolus timing. No central pulmonary embolism seen. Cannot evaluate previously seen right segmental and subsegmental branches.    Acute pathology.    ==========================================================      ASSESSMENT :     Other pulmonary embolism without acute cor pulmonale    Pulmonary embolism    Anemia    No pertinent past medical history    No significant past surgical history        PLAN:  HPI:  25y Female with PMH of Anemia, herpes presented with chest pain since morning. Pt reports sudden onset, 5/10, sharp left sided chest pain since 5 AM. Pt was recently admitted to [facility] for Pulmonary embolism and was discharged on Lovenox. Pt denies any fever, palpitations, shortness of breath, N/V/D, abdominal pain, urinary symptoms or any other acute complaints.    In ED, /62, HR 82 (17 Mar 2021 15:00)    # REPORTS EPISODE OF LIGHTHEADEDNESS - IMPROVED - F/U ORTHOSTATIC VITALS, PLACED ON IVF  # SINUS TACHYCARDIA - ON TELEMETRY, ON IVF, F/U UCA AND BCX  # PULMONARY EMBOLISM RECENTLY  PLACED ON LOVENOX NOW W/ SUSPECTED PLEURITIC CHEST PAIN - REVIEWED REPEAT CTA CHEST, EKG REVIEWED  - NOTED PATIENT IS STILL BREAST FEEDING; LAST PREGNANCY WAS 2 YEARS AGO  - COVID AB NEGATIVE RECENTLY  - MAGUI - TRACE MR  - DENIES FAMILY HX OF AUTOIMMUNE DISEASE, DENIES HX OF MISCARRIAGES, DENIES HX OF COVID, DENIES USE OF OCP ; HAS BEEN NWB AND SEDENTARY SINCE RECENT ANKLE SPRAIN  # UTI - PLACED ON ROCEPHIN, F/U UCX  # RECENT INJURY OF RIGHT ANKLE :  - MR ANKLE DEMONSTRATES - BONE CONTUSION, SUBCHONDRAL TRABECULAR FRACTURE, SMALL ANKLE JOINT EFFUSION, TEAR OF THE ANTERIOR TALOFIBULAR LIGAMENT, COMPLETE TEAR OF THE CALCANEOFIBULAR LIGAMENT, SPRAIN, HIGH GRADE TEAR OF THE DEEP TIBIOTALAR LIGAMENT AND PARTIAL THICKNESS TEAR AT THE TALOCALCANEAL LIGAMENT, HIGH-GRADE PARTIAL THICKNESS TEAR AT THE DEEP DELTOID LIGAMENT AND MODERATE PARTIAL THICKNESS TEAR AT THE SUPERFICIAL DELTOID LIGAMENT  - PODIATRY IS FOLLOWING AS OUTPATIENT - PLAN FOR SURGERY ONCE PATIENT CAN BE OFF OF LOVENOX  # VITAMIN D DEFICIENCY - ON SUPPLEMENT  # HX OF ANEMIA  # HX OF HSV W/  GINGIVOSTOMATITIS,  GEOGRAPHIC TONGUE  - MAGIC MOUTHWASH, RECOMMEND OUTPATIENT F/U WITH DENTIST  # GI PPX ; ON LOVENOX    CARLOS MERCHANT MD COVERING BLAS MERCHANT MD

## 2021-03-18 NOTE — PROGRESS NOTE ADULT - SUBJECTIVE AND OBJECTIVE BOX
NP Note discussed with  Primary Attending    Patient is a 25y old  Female who presents with a chief complaint of DIZZINESS (18 Mar 2021 10:02)      INTERVAL HPI/OVERNIGHT EVENTS: no new complaints    MEDICATIONS  (STANDING):  cefTRIAXone   IVPB      cefTRIAXone   IVPB 1000 milliGRAM(s) IV Intermittent every 24 hours  enoxaparin Injectable 100 milliGRAM(s) SubCutaneous daily    MEDICATIONS  (PRN):  acetaminophen   Tablet .. 650 milliGRAM(s) Oral every 6 hours PRN Mild Pain (1 - 3)  ketorolac   Injectable 15 milliGRAM(s) IV Push every 8 hours PRN Moderate Pain (4 - 6)      __________________________________________________  REVIEW OF SYSTEMS:    CONSTITUTIONAL: No fever,   EYES: no acute visual disturbances  NECK: No pain or stiffness  RESPIRATORY: No cough; No shortness of breath  CARDIOVASCULAR:  chest pain improved,  no palpitations  GASTROINTESTINAL: No pain. No nausea or vomiting; No diarrhea   NEUROLOGICAL: No headache or numbness, no tremors  MUSCULOSKELETAL: No joint pain, no muscle pain  GENITOURINARY: no dysuria, no frequency, no hesitancy        Vital Signs Last 24 Hrs  T(C): 37.1 (18 Mar 2021 15:10), Max: 37.7 (17 Mar 2021 19:15)  T(F): 98.7 (18 Mar 2021 15:10), Max: 99.8 (17 Mar 2021 19:15)  HR: 91 (18 Mar 2021 15:10) (74 - 91)  BP: 94/55 (18 Mar 2021 15:10) (92/54 - 111/68)  BP(mean): --  RR: 18 (18 Mar 2021 15:10) (18 - 18)  SpO2: 98% (18 Mar 2021 15:10) (98% - 100%)    ________________________________________________  PHYSICAL EXAM:  GENERAL: NAD  HEENT: Normocephalic;  conjunctivae and sclerae clear; moist mucous membranes;   NECK : supple  CHEST/LUNG: Clear to auscultation bilaterally with good air entry   HEART: S1 S2  regular; peroids of sinus tachycardia to 120s, mildly hypotensive   ABDOMEN: Soft, Nontender, Nondistended; Bowel sounds present  EXTREMITIES: no cyanosis; no edema; no calf tenderness  SKIN: warm and dry; no rash  NERVOUS SYSTEM:  Awake and alert; Oriented  to place, person and time ; no new deficits    _________________________________________________  LABS:                        10.8   4.58  )-----------( 232      ( 18 Mar 2021 07:48 )             33.9     03-18    140  |  109<H>  |  6<L>  ----------------------------<  78  3.5   |  24  |  0.62    Ca    9.1      18 Mar 2021 07:47  Phos  3.6     03-18  Mg     2.1     03-18    TPro  6.1  /  Alb  3.1<L>  /  TBili  0.8  /  DBili  x   /  AST  9<L>  /  ALT  30  /  AlkPhos  42  03-18    PT/INR - ( 17 Mar 2021 09:25 )   PT: 13.5 sec;   INR: 1.14 ratio         PTT - ( 17 Mar 2021 09:25 )  PTT:33.7 sec  Urinalysis Basic - ( 17 Mar 2021 08:30 )    Color: Yellow / Appearance: Clear / S.015 / pH: x  Gluc: x / Ketone: Negative  / Bili: Negative / Urobili: Negative   Blood: x / Protein: Negative / Nitrite: Negative   Leuk Esterase: Moderate / RBC: 0-2 /HPF / WBC 11-25 /HPF   Sq Epi: x / Non Sq Epi: Few /HPF / Bacteria: Few /HPF      CAPILLARY BLOOD GLUCOSE            RADIOLOGY & ADDITIONAL TESTS:    Imaging Personally Reviewed:  YES/NO    Consultant(s) Notes Reviewed:   YES/ No    Care Discussed with Consultants :     Plan of care was discussed with patient and /or primary care giver; all questions and concerns were addressed and care was aligned with patient's wishes.

## 2021-03-18 NOTE — PATIENT PROFILE ADULT - DOES PATIENT HAVE ADVANCE DIRECTIVE
Chief Complaint   Patient presents with     Establish Care     patient states she is here to establish care and physical.       HUGH CASTILLO at 12:38 PM on 3/6/2017.     4 No

## 2021-03-18 NOTE — PROGRESS NOTE ADULT - ASSESSMENT
25y Female with PMH of Anemia  presented with sudden onset, 5/10, sharp left sided chest. Pt was recently admitted to [facility] for Pulmonary embolism and was discharged on Lovenox.   Admitted for chest pain likely due to PE   UA mildly positive, started on ceftriaxone, pending urine culture   Pt seen at bedside, states is feeling better, no chest pain. Monitored on telemetry with period of sinus tachycardia to 120s, mildly hypotensive 95/55   Cardiology consulted

## 2021-03-18 NOTE — PROGRESS NOTE ADULT - PROBLEM SELECTOR PLAN 1
p/w Chest pain likely due to Pulmonary embolism  CXR clear  Troponin negative  EKG NSR   Tele monitoring  c/w lovenox 100 mg sc daily  Pain medications as needed  Dr Castellanos cardio consulted

## 2021-03-19 ENCOUNTER — TRANSCRIPTION ENCOUNTER (OUTPATIENT)
Age: 26
End: 2021-03-19

## 2021-03-19 DIAGNOSIS — Z02.9 ENCOUNTER FOR ADMINISTRATIVE EXAMINATIONS, UNSPECIFIED: ICD-10-CM

## 2021-03-19 DIAGNOSIS — Z53.29 PROCEDURE AND TREATMENT NOT CARRIED OUT BECAUSE OF PATIENT'S DECISION FOR OTHER REASONS: ICD-10-CM

## 2021-03-19 DIAGNOSIS — S99.911A UNSPECIFIED INJURY OF RIGHT ANKLE, INITIAL ENCOUNTER: ICD-10-CM

## 2021-03-19 LAB
ANION GAP SERPL CALC-SCNC: 5 MMOL/L — SIGNIFICANT CHANGE UP (ref 5–17)
BUN SERPL-MCNC: 8 MG/DL — SIGNIFICANT CHANGE UP (ref 7–18)
CALCIUM SERPL-MCNC: 8.4 MG/DL — SIGNIFICANT CHANGE UP (ref 8.4–10.5)
CHLORIDE SERPL-SCNC: 112 MMOL/L — HIGH (ref 96–108)
CO2 SERPL-SCNC: 25 MMOL/L — SIGNIFICANT CHANGE UP (ref 22–31)
CREAT SERPL-MCNC: 0.56 MG/DL — SIGNIFICANT CHANGE UP (ref 0.5–1.3)
CULTURE RESULTS: SIGNIFICANT CHANGE UP
GLUCOSE SERPL-MCNC: 77 MG/DL — SIGNIFICANT CHANGE UP (ref 70–99)
HCT VFR BLD CALC: 32.5 % — LOW (ref 34.5–45)
HGB BLD-MCNC: 10.1 G/DL — LOW (ref 11.5–15.5)
MCHC RBC-ENTMCNC: 26.7 PG — LOW (ref 27–34)
MCHC RBC-ENTMCNC: 31.1 GM/DL — LOW (ref 32–36)
MCV RBC AUTO: 86 FL — SIGNIFICANT CHANGE UP (ref 80–100)
NRBC # BLD: 0 /100 WBCS — SIGNIFICANT CHANGE UP (ref 0–0)
PLATELET # BLD AUTO: 227 K/UL — SIGNIFICANT CHANGE UP (ref 150–400)
POTASSIUM SERPL-MCNC: 3.7 MMOL/L — SIGNIFICANT CHANGE UP (ref 3.5–5.3)
POTASSIUM SERPL-SCNC: 3.7 MMOL/L — SIGNIFICANT CHANGE UP (ref 3.5–5.3)
RBC # BLD: 3.78 M/UL — LOW (ref 3.8–5.2)
RBC # FLD: 14.8 % — HIGH (ref 10.3–14.5)
SODIUM SERPL-SCNC: 142 MMOL/L — SIGNIFICANT CHANGE UP (ref 135–145)
SPECIMEN SOURCE: SIGNIFICANT CHANGE UP
WBC # BLD: 4.46 K/UL — SIGNIFICANT CHANGE UP (ref 3.8–10.5)
WBC # FLD AUTO: 4.46 K/UL — SIGNIFICANT CHANGE UP (ref 3.8–10.5)

## 2021-03-19 RX ADMIN — ENOXAPARIN SODIUM 100 MILLIGRAM(S): 100 INJECTION SUBCUTANEOUS at 12:12

## 2021-03-19 NOTE — PROGRESS NOTE ADULT - SUBJECTIVE AND OBJECTIVE BOX
Patient is a 25y old  Female who presents with a chief complaint of DIZZINESS (18 Mar 2021 10:02)    PATIENT IS SEEN AND EXAMINED IN MEDICAL FLOOR.  NGT [    ]    CASTREJON [   ]      GT [   ]    ALLERGIES:  acyclovir (Swelling)      Daily     Daily     VITALS:    Vital Signs Last 24 Hrs  T(C): 36.9 (19 Mar 2021 05:11), Max: 37.2 (18 Mar 2021 21:19)  T(F): 98.5 (19 Mar 2021 05:11), Max: 98.9 (18 Mar 2021 21:19)  HR: 72 (19 Mar 2021 05:11) (72 - 96)  BP: 95/60 (19 Mar 2021 05:11) (94/55 - 103/65)  BP(mean): --  RR: 18 (19 Mar 2021 05:11) (16 - 18)  SpO2: 97% (19 Mar 2021 05:11) (97% - 98%)    LABS:    CBC Full  -  ( 19 Mar 2021 08:48 )  WBC Count : 4.46 K/uL  RBC Count : 3.78 M/uL  Hemoglobin : 10.1 g/dL  Hematocrit : 32.5 %  Platelet Count - Automated : 227 K/uL  Mean Cell Volume : 86.0 fl  Mean Cell Hemoglobin : 26.7 pg  Mean Cell Hemoglobin Concentration : 31.1 gm/dL  Auto Neutrophil # : x  Auto Lymphocyte # : x  Auto Monocyte # : x  Auto Eosinophil # : x  Auto Basophil # : x  Auto Neutrophil % : x  Auto Lymphocyte % : x  Auto Monocyte % : x  Auto Eosinophil % : x  Auto Basophil % : x      03-19    142  |  112<H>  |  8   ----------------------------<  77  3.7   |  25  |  0.56    Ca    8.4      19 Mar 2021 08:48  Phos  3.6     03-18  Mg     2.1     03-18    TPro  6.1  /  Alb  3.1<L>  /  TBili  0.8  /  DBili  x   /  AST  9<L>  /  ALT  30  /  AlkPhos  42  03-18    CAPILLARY BLOOD GLUCOSE        CARDIAC MARKERS ( 17 Mar 2021 17:49 )  <0.015 ng/mL / x     / x     / x     / x          LIVER FUNCTIONS - ( 18 Mar 2021 07:47 )  Alb: 3.1 g/dL / Pro: 6.1 g/dL / ALK PHOS: 42 U/L / ALT: 30 U/L DA / AST: 9 U/L / GGT: x           Creatinine Trend: 0.56<--, 0.62<--, 0.59<--, 0.63<--, 0.63<--, 0.43<--  I&O's Summary              MEDICATIONS:    MEDICATIONS  (STANDING):  cefTRIAXone   IVPB      cefTRIAXone   IVPB 1000 milliGRAM(s) IV Intermittent every 24 hours  enoxaparin Injectable 100 milliGRAM(s) SubCutaneous daily  sodium chloride 0.9%. 1000 milliLiter(s) (100 mL/Hr) IV Continuous <Continuous>      MEDICATIONS  (PRN):  acetaminophen   Tablet .. 650 milliGRAM(s) Oral every 6 hours PRN Mild Pain (1 - 3)  ketorolac   Injectable 15 milliGRAM(s) IV Push every 8 hours PRN Moderate Pain (4 - 6)      REVIEW OF SYSTEMS:                           ALL ROS DONE [ X   ]    CONSTITUTIONAL:  LETHARGIC [   ], FEVER [   ], UNRESPONSIVE [   ]  CVS:  CP  [   ], SOB, [   ], PALPITATIONS [   ], DIZZYNESS [   ]  RS: COUGH [   ], SPUTUM [   ]  GI: ABDOMINAL PAIN [   ], NAUSEA [   ], VOMITINGS [   ], DIARRHEA [   ], CONSTIPATION [   ]  :  DYSURIA [   ], NOCTURIA [   ], INCREASED FREQUENCY [   ], DRIBLING [   ],  SKELETAL: PAINFUL JOINTS [   ], SWOLLEN JOINTS [   ], NECK ACHE [   ], LOW BACK ACHE [   ],  SKIN : ULCERS [   ], RASH [   ], ITCHING [   ]  CNS: HEAD ACHE [   ], DOUBLE VISION [   ], BLURRED VISION [   ], AMS / CONFUSION [   ], SEIZURES [   ], WEAKNESS [   ],TINGLING / NUMBNESS [   ]    REVIEW OF SYSTEMS:                           ALL ROS DONE [ X   ]    CONSTITUTIONAL:  LETHARGIC [   ], FEVER [   ], UNRESPONSIVE [   ]  CVS:  CP  [   ], SOB, [   ], PALPITATIONS [   ], DIZZYNESS [   ]  RS: COUGH [   ], SPUTUM [   ]  GI: ABDOMINAL PAIN [   ], NAUSEA [   ], VOMITINGS [   ], DIARRHEA [   ], CONSTIPATION [   ]  :  DYSURIA [   ], NOCTURIA [   ], INCREASED FREQUENCY [   ], DRIBLING [   ],  SKELETAL: PAINFUL JOINTS [   ], SWOLLEN JOINTS [   ], NECK ACHE [   ], LOW BACK ACHE [   ],  SKIN : ULCERS [   ], RASH [   ], ITCHING [   ]  CNS: HEAD ACHE [   ], DOUBLE VISION [   ], BLURRED VISION [   ], AMS / CONFUSION [   ], SEIZURES [   ], WEAKNESS [   ],TINGLING / NUMBNESS [   ]    PHYSICAL EXAMINATION:  GENERAL APPEARANCE: NO DISTRESS  HEENT:  NO PALLOR, NO  JVD,  NO   NODES, NECK SUPPLE  CVS: S1 +, S2 +,   RS: AEEB,  OCCASIONAL  RALES +,   NO RONCHI  ABD: SOFT, NT, NO, BS +  EXT: NO PE  SKIN: WARM,     SKELETAL:  ROM ACCEPTABLE     R ANKLE WRAPPED W/ COMPRESSION ACE WRAPPING  CNS:  AAO X 3 ,   DEFICITS    RADIOLOGY :    EXAM:  CT ANGIO CHEST (W)AW IC                            PROCEDURE DATE:  03/17/2021          INTERPRETATION:  CLINICAL INFORMATION: Chest pain with history of pulmonary embolism    COMPARISON: 3/5/2021    CONTRAST/COMPLICATIONS:  IV Contrast: Omnipaque 350 / 50 cc administered / 50 cc discarded.  Oral Contrast: NONE  Complications: None reported at time of study completion    PROCEDURE:  CT Angiography of the Chest.  Sagittal and coronal reformats were performed as well as 3D (MIP) reconstructions. Study is limited by suboptimal bolus timing.    FINDINGS:    LUNGS AND AIRWAYS: Patent central airways.  Lungs are clear.  PLEURA: No pleural effusion.  MEDIASTINUM AND ARVIND: No lymphadenopathy.  VESSELS: Within normal limits.  HEART: Heart size is normal. No pericardial effusion.  CHEST WALL AND LOWER NECK: Within normal limits.  VISUALIZED UPPER ABDOMEN: Within normal limits.  BONES: Within normal limits.    IMPRESSION:  Limited by bolus timing. No central pulmonary embolism seen. Cannot evaluate previously seen right segmental and subsegmental branches.    Acute pathology.    ==========================================================      ASSESSMENT :     Other pulmonary embolism without acute cor pulmonale    Pulmonary embolism    Anemia    No pertinent past medical history    No significant past surgical history        PLAN:  HPI:  25y Female with PMH of Anemia, herpes presented with chest pain since morning. Pt reports sudden onset, 5/10, sharp left sided chest pain since 5 AM. Pt was recently admitted to [facility] for Pulmonary embolism and was discharged on Lovenox. Pt denies any fever, palpitations, shortness of breath, N/V/D, abdominal pain, urinary symptoms or any other acute complaints.    In ED, /62, HR 82 (17 Mar 2021 15:00)    # REPORTS EPISODE OF LIGHTHEADEDNESS - IMPROVED - F/U ORTHOSTATIC VITALS, PLACED ON IVF  # SINUS TACHYCARDIA - ON TELEMETRY, ON IVF, F/U UCA AND BCX  # PULMONARY EMBOLISM RECENTLY  PLACED ON LOVENOX NOW W/ SUSPECTED PLEURITIC CHEST PAIN - REVIEWED REPEAT CTA CHEST, EKG REVIEWED  - NOTED PATIENT IS STILL BREAST FEEDING; LAST PREGNANCY WAS 2 YEARS AGO  - COVID AB NEGATIVE RECENTLY  - MAGUI - TRACE MR  - DENIES FAMILY HX OF AUTOIMMUNE DISEASE, DENIES HX OF MISCARRIAGES, DENIES HX OF COVID, DENIES USE OF OCP ; HAS BEEN NWB AND SEDENTARY SINCE RECENT ANKLE SPRAIN  # UTI - PLACED ON ROCEPHIN, F/U UCX  # RECENT INJURY OF RIGHT ANKLE :  - MR ANKLE DEMONSTRATES - BONE CONTUSION, SUBCHONDRAL TRABECULAR FRACTURE, SMALL ANKLE JOINT EFFUSION, TEAR OF THE ANTERIOR TALOFIBULAR LIGAMENT, COMPLETE TEAR OF THE CALCANEOFIBULAR LIGAMENT, SPRAIN, HIGH GRADE TEAR OF THE DEEP TIBIOTALAR LIGAMENT AND PARTIAL THICKNESS TEAR AT THE TALOCALCANEAL LIGAMENT, HIGH-GRADE PARTIAL THICKNESS TEAR AT THE DEEP DELTOID LIGAMENT AND MODERATE PARTIAL THICKNESS TEAR AT THE SUPERFICIAL DELTOID LIGAMENT  - PODIATRY IS FOLLOWING AS OUTPATIENT - PLAN FOR SURGERY ONCE PATIENT CAN BE OFF OF LOVENOX  # VITAMIN D DEFICIENCY - ON SUPPLEMENT  # HX OF ANEMIA  # HX OF HSV W/  GINGIVOSTOMATITIS,  GEOGRAPHIC TONGUE  - MAGIC MOUTHWASH, RECOMMEND OUTPATIENT F/U WITH DENTIST  # GI PPX ; ON LOVENOX    CARLOS MERCHANT MD COVERING BLAS MERCHANT MD   Patient is a 25y old  Female who presents with a chief complaint of DIZZINESS (18 Mar 2021 10:02)    PATIENT IS SEEN AND EXAMINED IN MEDICAL FLOOR.    ALLERGIES:  acyclovir (Swelling)    VITALS:    Vital Signs Last 24 Hrs  T(C): 36.9 (19 Mar 2021 05:11), Max: 37.2 (18 Mar 2021 21:19)  T(F): 98.5 (19 Mar 2021 05:11), Max: 98.9 (18 Mar 2021 21:19)  HR: 72 (19 Mar 2021 05:11) (72 - 96)  BP: 95/60 (19 Mar 2021 05:11) (94/55 - 103/65)  BP(mean): --  RR: 18 (19 Mar 2021 05:11) (16 - 18)  SpO2: 97% (19 Mar 2021 05:11) (97% - 98%)    LABS:    CBC Full  -  ( 19 Mar 2021 08:48 )  WBC Count : 4.46 K/uL  RBC Count : 3.78 M/uL  Hemoglobin : 10.1 g/dL  Hematocrit : 32.5 %  Platelet Count - Automated : 227 K/uL  Mean Cell Volume : 86.0 fl  Mean Cell Hemoglobin : 26.7 pg  Mean Cell Hemoglobin Concentration : 31.1 gm/dL  Auto Neutrophil # : x  Auto Lymphocyte # : x  Auto Monocyte # : x  Auto Eosinophil # : x  Auto Basophil # : x  Auto Neutrophil % : x  Auto Lymphocyte % : x  Auto Monocyte % : x  Auto Eosinophil % : x  Auto Basophil % : x      03-19    142  |  112<H>  |  8   ----------------------------<  77  3.7   |  25  |  0.56    Ca    8.4      19 Mar 2021 08:48  Phos  3.6     03-18  Mg     2.1     03-18    TPro  6.1  /  Alb  3.1<L>  /  TBili  0.8  /  DBili  x   /  AST  9<L>  /  ALT  30  /  AlkPhos  42  03-18    CAPILLARY BLOOD GLUCOSE        CARDIAC MARKERS ( 17 Mar 2021 17:49 )  <0.015 ng/mL / x     / x     / x     / x          LIVER FUNCTIONS - ( 18 Mar 2021 07:47 )  Alb: 3.1 g/dL / Pro: 6.1 g/dL / ALK PHOS: 42 U/L / ALT: 30 U/L DA / AST: 9 U/L / GGT: x           Creatinine Trend: 0.56<--, 0.62<--, 0.59<--, 0.63<--, 0.63<--, 0.43<--  I&O's Summary              MEDICATIONS:    MEDICATIONS  (STANDING):  cefTRIAXone   IVPB      cefTRIAXone   IVPB 1000 milliGRAM(s) IV Intermittent every 24 hours  enoxaparin Injectable 100 milliGRAM(s) SubCutaneous daily  sodium chloride 0.9%. 1000 milliLiter(s) (100 mL/Hr) IV Continuous <Continuous>      MEDICATIONS  (PRN):  acetaminophen   Tablet .. 650 milliGRAM(s) Oral every 6 hours PRN Mild Pain (1 - 3)  ketorolac   Injectable 15 milliGRAM(s) IV Push every 8 hours PRN Moderate Pain (4 - 6)      REVIEW OF SYSTEMS:                           ALL ROS DONE [ X   ]    CONSTITUTIONAL:  LETHARGIC [   ], FEVER [   ], UNRESPONSIVE [   ]  CVS:  CP  [   ], SOB, [   ], PALPITATIONS [   ], DIZZYNESS [   ]  RS: COUGH [   ], SPUTUM [   ]  GI: ABDOMINAL PAIN [   ], NAUSEA [   ], VOMITINGS [   ], DIARRHEA [   ], CONSTIPATION [   ]  :  DYSURIA [   ], NOCTURIA [   ], INCREASED FREQUENCY [   ], DRIBLING [   ],  SKELETAL: PAINFUL JOINTS [   ], SWOLLEN JOINTS [   ], NECK ACHE [   ], LOW BACK ACHE [   ],  SKIN : ULCERS [   ], RASH [   ], ITCHING [   ]  CNS: HEAD ACHE [   ], DOUBLE VISION [   ], BLURRED VISION [   ], AMS / CONFUSION [   ], SEIZURES [   ], WEAKNESS [   ],TINGLING / NUMBNESS [   ]    REVIEW OF SYSTEMS:                           ALL ROS DONE [ X   ]    CONSTITUTIONAL:  LETHARGIC [   ], FEVER [   ], UNRESPONSIVE [   ]  CVS:  CP  [   ], SOB, [   ], PALPITATIONS [   ], DIZZYNESS [   ]  RS: COUGH [   ], SPUTUM [   ]  GI: ABDOMINAL PAIN [   ], NAUSEA [   ], VOMITINGS [   ], DIARRHEA [   ], CONSTIPATION [   ]  :  DYSURIA [   ], NOCTURIA [   ], INCREASED FREQUENCY [   ], DRIBLING [   ],  SKELETAL: PAINFUL JOINTS [   ], SWOLLEN JOINTS [   ], NECK ACHE [   ], LOW BACK ACHE [   ],  SKIN : ULCERS [   ], RASH [   ], ITCHING [   ]  CNS: HEAD ACHE [   ], DOUBLE VISION [   ], BLURRED VISION [   ], AMS / CONFUSION [   ], SEIZURES [   ], WEAKNESS [   ],TINGLING / NUMBNESS [   ]    PHYSICAL EXAMINATION:  GENERAL APPEARANCE: NO DISTRESS  HEENT:  NO PALLOR, NO  JVD,  NO   NODES, NECK SUPPLE  CVS: S1 +, S2 +,   RS: AEEB,  OCCASIONAL  RALES +,   NO RONCHI  ABD: SOFT, NT, NO, BS +  EXT: NO PE  SKIN: WARM,     SKELETAL:  ROM ACCEPTABLE     R ANKLE WRAPPED W/ COMPRESSION ACE WRAPPING  CNS:  AAO X 3 ,   DEFICITS    RADIOLOGY :    EXAM:  CT ANGIO CHEST (W)AW IC                            PROCEDURE DATE:  03/17/2021          INTERPRETATION:  CLINICAL INFORMATION: Chest pain with history of pulmonary embolism    COMPARISON: 3/5/2021    CONTRAST/COMPLICATIONS:  IV Contrast: Omnipaque 350 / 50 cc administered / 50 cc discarded.  Oral Contrast: NONE  Complications: None reported at time of study completion    PROCEDURE:  CT Angiography of the Chest.  Sagittal and coronal reformats were performed as well as 3D (MIP) reconstructions. Study is limited by suboptimal bolus timing.    FINDINGS:    LUNGS AND AIRWAYS: Patent central airways.  Lungs are clear.  PLEURA: No pleural effusion.  MEDIASTINUM AND ARVIND: No lymphadenopathy.  VESSELS: Within normal limits.  HEART: Heart size is normal. No pericardial effusion.  CHEST WALL AND LOWER NECK: Within normal limits.  VISUALIZED UPPER ABDOMEN: Within normal limits.  BONES: Within normal limits.    IMPRESSION:  Limited by bolus timing. No central pulmonary embolism seen. Cannot evaluate previously seen right segmental and subsegmental branches.    Acute pathology.    ==========================================================      ASSESSMENT :     Other pulmonary embolism without acute cor pulmonale    Pulmonary embolism    Anemia    No pertinent past medical history    No significant past surgical history        PLAN:  HPI:  25y Female with PMH of Anemia, herpes presented with chest pain since morning. Pt reports sudden onset, 5/10, sharp left sided chest pain since 5 AM. Pt was recently admitted to [facility] for Pulmonary embolism and was discharged on Lovenox. Pt denies any fever, palpitations, shortness of breath, N/V/D, abdominal pain, urinary symptoms or any other acute complaints.    In ED, /62, HR 82 (17 Mar 2021 15:00)    # REPORTS EPISODE OF LIGHTHEADEDNESS - REVIEWED ORTHOSTATIC VITALS, S/P IVF, EP AND CARDIOLOGY CONSULT - SUGGEST THIS IS S/T DECONDITIONING AND RECOMMEND OUTPATIENT PT AND F/U WITH CARDIOLOGY FOR FURTHER EVALUATION  # SINUS TACHYCARDIA - ON TELEMETRY, ON IVF, F/U UCA AND BCX  # PULMONARY EMBOLISM RECENTLY  PLACED ON LOVENOX NOW W/ SUSPECTED PLEURITIC CHEST PAIN - REVIEWED REPEAT CTA CHEST, EKG REVIEWED  - NOTED PATIENT IS STILL BREAST FEEDING; LAST PREGNANCY WAS 2 YEARS AGO  - COVID AB NEGATIVE RECENTLY  - MAGUI - TRACE MR  - DENIES FAMILY HX OF AUTOIMMUNE DISEASE, DENIES HX OF MISCARRIAGES, DENIES HX OF COVID, DENIES USE OF OCP ; HAS BEEN NWB AND SEDENTARY SINCE RECENT ANKLE SPRAIN  # UTI - PLACED ON ROCEPHIN, F/U UCX  # RECENT INJURY OF RIGHT ANKLE :  - MR ANKLE DEMONSTRATES - BONE CONTUSION, SUBCHONDRAL TRABECULAR FRACTURE, SMALL ANKLE JOINT EFFUSION, TEAR OF THE ANTERIOR TALOFIBULAR LIGAMENT, COMPLETE TEAR OF THE CALCANEOFIBULAR LIGAMENT, SPRAIN, HIGH GRADE TEAR OF THE DEEP TIBIOTALAR LIGAMENT AND PARTIAL THICKNESS TEAR AT THE TALOCALCANEAL LIGAMENT, HIGH-GRADE PARTIAL THICKNESS TEAR AT THE DEEP DELTOID LIGAMENT AND MODERATE PARTIAL THICKNESS TEAR AT THE SUPERFICIAL DELTOID LIGAMENT  - PODIATRY IS FOLLOWING AS OUTPATIENT - PLAN FOR SURGERY ONCE PATIENT CAN BE OFF OF LOVENOX  # VITAMIN D DEFICIENCY - ON SUPPLEMENT  # HX OF ANEMIA  # HX OF HSV W/  GINGIVOSTOMATITIS,  GEOGRAPHIC TONGUE  - MAGIC MOUTHWASH, RECOMMEND OUTPATIENT F/U WITH DENTIST  # GI PPX ; ON LOVENOX    CARLOS MERCHANT MD COVERING BLAS MERCHANT MD

## 2021-03-19 NOTE — DISCHARGE NOTE PROVIDER - NSDCMRMEDTOKEN_GEN_ALL_CORE_FT
diphenhydramine/lidocaine/aluminum hydroxide/magnesium hydroxide/simethicone mucous membrane suspension: 1 application mucous membrane 3 times a day swish and spit    ergocalciferol 50,000 intl units (1.25 mg) oral capsule: 1 cap(s) orally once a week every Sunday   Lovenox 100 mg/mL injectable solution: 100 milligram(s) subcutaneously once a day   oxyCODONE 10 mg oral tablet: 1 tab(s) orally every 6 hours, As Needed -Severe Pain (7 - 10) MDD:4 tabs/24 hrs   acetaminophen 325 mg oral tablet: 2 tab(s) orally every 6 hours, As needed, Mild Pain (1 - 3)  diphenhydramine/lidocaine/aluminum hydroxide/magnesium hydroxide/simethicone mucous membrane suspension: 1 application mucous membrane 3 times a day swish and spit    ergocalciferol 50,000 intl units (1.25 mg) oral capsule: 1 cap(s) orally once a week every Sunday   Lovenox 100 mg/mL injectable solution: 100 milligram(s) subcutaneously once a day   oxyCODONE 10 mg oral tablet: 1 tab(s) orally every 6 hours, As Needed -Severe Pain (7 - 10) MDD:4 tabs/24 hrs

## 2021-03-19 NOTE — CONSULT NOTE ADULT - SUBJECTIVE AND OBJECTIVE BOX
HISTORY OF PRESENT ILLNESS: HPI:  25y Female with PMH of Anemia, recent PE after being immobile due to an ankle injury presented with chest pain, palpitations since morning of admission. Pt reports sudden onset, 5/10, sharp left sided chest pain since 5 AM. Pt was recently admitted to [facility] for Pulmonary embolism and was discharged on Lovenox. Pt denies any fever, palpitations, shortness of breath, N/V/D, abdominal pain, urinary symptoms or any other acute complaints.  Reports that her palpitations can occur when resting but more noticeable with standing.    In ED, /62, HR 82 (17 Mar 2021 15:00)      PAST MEDICAL & SURGICAL HISTORY:  Pulmonary embolism    Anemia    No significant past surgical history            MEDICATIONS:  MEDICATIONS  (STANDING):  cefTRIAXone   IVPB      cefTRIAXone   IVPB 1000 milliGRAM(s) IV Intermittent every 24 hours  enoxaparin Injectable 100 milliGRAM(s) SubCutaneous daily  sodium chloride 0.9%. 1000 milliLiter(s) (100 mL/Hr) IV Continuous <Continuous>      Allergies    acyclovir (Swelling)    Intolerances        FAMILY HISTORY:    Non-contributary for premature coronary disease or sudden cardiac death    SOCIAL HISTORY:    [ X] Non-smoker  [ ] Smoker  [ ] Alcohol      REVIEW OF SYSTEMS:  [ X]chest pain  [  ]shortness of breath  [ X ]palpitations  [  ]syncope  [ ]near syncope [ ]upper extremity weakness   [ ] lower extremity weakness  [  ]diplopia  [  ]altered mental status   [  ]fevers  [ ]chills [ ]nausea  [ ]vomitting  [  ]dysphagia    [ ]abdominal pain  [ ]melena  [ ]BRBPR    [  ]epistaxis  [  ]rash    [ ]lower extremity edema        [ ] All others negative	  [ ] Unable to obtain      LABS:	 	    CARDIAC MARKERS:  CARDIAC MARKERS ( 17 Mar 2021 17:49 )  <0.015 ng/mL / x     / x     / x     / x      CARDIAC MARKERS ( 17 Mar 2021 09:25 )  <0.015 ng/mL / x     / x     / x     / x                                  10.1   4.46  )-----------( 227      ( 19 Mar 2021 08:48 )             32.5     Hb Trend: 10.1<--    03-19    142  |  112<H>  |  8   ----------------------------<  77  3.7   |  25  |  0.56    Ca    8.4      19 Mar 2021 08:48  Phos  3.6     03-18  Mg     2.1     03-18    TPro  6.1  /  Alb  3.1<L>  /  TBili  0.8  /  DBili  x   /  AST  9<L>  /  ALT  30  /  AlkPhos  42  03-18    Creatinine Trend: 0.56<--, 0.62<--, 0.59<--, 0.63<--, 0.63<--, 0.43<--          PHYSICAL EXAM:  T(C): 36.9 (03-19-21 @ 05:11), Max: 37.2 (03-18-21 @ 21:19)  HR: 72 (03-19-21 @ 05:11) (72 - 96)  BP: 95/60 (03-19-21 @ 05:11) (94/55 - 103/65)  RR: 18 (03-19-21 @ 05:11) (16 - 18)  SpO2: 97% (03-19-21 @ 05:11) (97% - 98%)  Wt(kg): --   BMI (kg/m2): 27.8 (03-17-21 @ 06:45)  I&O's Summary      HEENT:  (-)icterus (-)pallor  CV: N S1 S2 1/6 ENA (+)2 Pulses B/l  Resp:  Clear to ausculatation B/L, normal effort  GI: (+) BS Soft, NT, ND  Lymph:  (-)Edema, (-)obvious lymphadenopathy  Skin: Warm to touch, Normal turgor  Psych: Appropriate mood and affect        TELEMETRY: 	  sinus jose sinus Tach vs PAT    ECG:  	Sinus 74 BPM    RADIOLOGY:         CXR:   No infiltrate    ASSESSMENT/PLAN: 	25y Female PMH of Anemia, recent PE after being immobile due to an ankle injury presented with chest pain, palpitations since morning of admission. recent echo with no structural heart disease.    - HR trens on tele is unusual, instead of a slow ramp up of rate she appears to shoot into a higher heart rate which may suggest an atrial tachycardia.  Interestingly, herorthostatic vitals revela a relatively stable blood pressure with an increase in heart rate ? postural orthostatic tachycardia.  Ep eval Dr. Barnes called  - IVF  - TSH within normal limits  - A suboptimal  CTPA did not demonste the previously seen pulmonary emboli     I once again thank you for allowing me to participate in the care of your patient.  If you have any questions or concerns please do not hesitate to contact me.    Emir Castellanos MD, [facility]  BEEPER [phone]       Modified Advancement Flap Text: The defect edges were debeveled with a #15 scalpel blade.  Given the location of the defect, shape of the defect and the proximity to free margins a modified advancement flap was deemed most appropriate.  Using a sterile surgical marker, an appropriate advancement flap was drawn incorporating the defect and placing the expected incisions within the relaxed skin tension lines where possible.    The area thus outlined was incised deep to adipose tissue with a #15 scalpel blade.  The skin margins were undermined to an appropriate distance in all directions utilizing iris scissors.

## 2021-03-19 NOTE — DISCHARGE NOTE PROVIDER - NSDCCPCAREPLAN_GEN_ALL_CORE_FT
PRINCIPAL DISCHARGE DIAGNOSIS  Diagnosis: Pulmonary embolism  Assessment and Plan of Treatment: You were admitted for chest pain, palpitation likey due to recent pulmonary embolism. EKG shows NSR, Chest xray clear. Monitored closely on cardiac monitor. You were followed by cardiologist and electrophysiologist to evaluate. Your heart rate elevated on exertion. w/ higher sensitivity to exercise, due to deconditioning.  CT angio chest  did not demonste the previously seen pulmonary emboli. cardiac enzyme is normal.  No further cardiac work up is nessesary for cardiology.    Continue taking the Lovenox injections as prescribed once a day   Follow up with Hematologist Dr Hernandez and cardiology dr. Castellanos in 2weeks.      SECONDARY DISCHARGE DIAGNOSES  Diagnosis: Instability of right ankle joint  Assessment and Plan of Treatment: You have the history of Right ankle fracture. You underwent MRI scan which showed multiple bone and ligament injuries. You were seen by the Podiatrist previous admission and missed appointment.   - You are advised to put Ankle ASO. and keep the right leg elevated.   -Full weight bearing left leg. weight bearing as tolerated on right lower leg.   - You are also advised Passive  range of motion of the ankle.   - You are advised to move around and you can put weight on the right ankle as you already had blood clot in the lungs due to immobility.   - You are advised to follow up with Podiatry Dr John in 1-2 weeks for further recommendations   - Follow up orthopedic dr. Goldberg to revaluate ligamentous findings and to determine duration of NWB status.    Diagnosis: UTI (urinary tract infection)  Assessment and Plan of Treatment: Your urine test shows urinary tract infection. Treated wtih IV antibiotics.   continue oral antibiotic as prescribed. --------?  HOME CARE INSTRUCTIONS  If you were prescribed antibiotics, take them exactly as your caregiver instructs you. Finish the medication even if you feel better after you have only taken some of the medication.  Drink enough water and fluids to keep your urine clear or pale yellow.  Avoid caffeine, tea, and carbonated beverages. They tend to irritate your bladder.  Empty your bladder often. Avoid holding urine for long periods of time.  After a bowel movement, women should cleanse from front to back. Use each tissue only once.  SEEK MEDICAL CARE IF:  You have back pain.  You develop a fever.  Your symptoms do not begin to resolve within 3 days.  SEEK IMMEDIATE MEDICAL CARE IF:  You have severe back pain or lower abdominal pain.  You develop chills.  You have nausea or vomiting.  You have continued burning or discomfort with urination.       PRINCIPAL DISCHARGE DIAGNOSIS  Diagnosis: Pulmonary embolism  Assessment and Plan of Treatment: You were admitted for chest pain, palpitation likey due to recent pulmonary embolism. EKG shows NSR, Chest xray clear. Monitored closely on cardiac monitor. You were followed by cardiologist and electrophysiologist to evaluate. Your heart rate elevated on exertion. w/ higher sensitivity to exercise, due to deconditioning with ankle injur and PE.  CT angio chest  did not demonste the previously seen pulmonary emboli. cardiac enzyme is normal.  No further cardiac work up is nessesary  as per cardiology.    Continue taking the Lovenox injections as prescribed once a day   Follow up with Hematologist Dr Hernandez and cardiology dr. Castellanos in 2weeks.      SECONDARY DISCHARGE DIAGNOSES  Diagnosis: Instability of right ankle joint  Assessment and Plan of Treatment: You have the history of Right ankle fracture. You underwent MRI scan which showed multiple bone and ligament injuries. You were seen by the Podiatrist previous admission and missed appointment.   - You are advised to put Ankle ASO. and keep the right leg elevated.   -Full weight bearing left leg. weight bearing as tolerated on right lower leg.   - You are also advised Passive  range of motion of the ankle.   - You are advised to move around and you can put weight on the right ankle as you already had blood clot in the lungs due to immobility.   - You are advised to follow up with Podiatry Dr John in 1-2 weeks for further recommendations   - Follow up orthopedic dr. Goldberg to revaluate ligamentous findings and to determine duration of NWB status.    Diagnosis: UTI (urinary tract infection)  Assessment and Plan of Treatment: Your urine test shows urinary tract infection. Treated wtih IV antibiotics.   continue oral antibiotic as prescribed. --------?  HOME CARE INSTRUCTIONS  If you were prescribed antibiotics, take them exactly as your caregiver instructs you. Finish the medication even if you feel better after you have only taken some of the medication.  Drink enough water and fluids to keep your urine clear or pale yellow.  Avoid caffeine, tea, and carbonated beverages. They tend to irritate your bladder.  Empty your bladder often. Avoid holding urine for long periods of time.  After a bowel movement, women should cleanse from front to back. Use each tissue only once.  SEEK MEDICAL CARE IF:  You have back pain.  You develop a fever.  Your symptoms do not begin to resolve within 3 days.  SEEK IMMEDIATE MEDICAL CARE IF:  You have severe back pain or lower abdominal pain.  You develop chills.  You have nausea or vomiting.  You have continued burning or discomfort with urination.       PRINCIPAL DISCHARGE DIAGNOSIS  Diagnosis: Pulmonary embolism  Assessment and Plan of Treatment: You were admitted for chest pain, palpitation likey due to recent pulmonary embolism. EKG shows NSR, Chest xray clear. Monitored closely on cardiac monitor. You were followed by cardiologist and electrophysiologist to evaluate. Your heart rate elevated on exertion. w/ higher sensitivity to exercise, due to deconditioning with ankle injur and PE.  CT angio chest  did not demonste the previously seen pulmonary emboli. cardiac enzyme is normal.  No further cardiac work up is nessesary  as per cardiology.    Continue taking the Lovenox injections as prescribed once a day   Follow up with Hematologist Dr Hernandez and cardiology dr. Castellanos in 2weeks.  Please follow up with your PC[facility] and repeat CBC within this week      SECONDARY DISCHARGE DIAGNOSES  Diagnosis: Instability of right ankle joint  Assessment and Plan of Treatment: You have the history of Right ankle fracture. You underwent MRI scan which showed multiple bone and ligament injuries. You were seen by the Podiatrist previous admission and missed appointment.   - You are advised to put Ankle ASO. and keep the right leg elevated.   -Full weight bearing left leg. weight bearing as tolerated on right lower leg.   - You are also advised Passive  range of motion of the ankle.   - You are advised to move around and you can put weight on the right ankle as you already had blood clot in the lungs due to immobility.   - You are advised to follow up with Podiatry Dr John in 1-2 weeks for further recommendations   - Follow up orthopedic dr. Goldberg to revaluate ligamentous findings and to determine duration of NWB status.    Diagnosis: UTI (urinary tract infection)  Assessment and Plan of Treatment: Your urine culture was negative for any UTI   HOME CARE INSTRUCTIONS  If you were prescribed antibiotics, take them exactly as your caregiver instructs you. Finish the medication even if you feel better after you have only taken some of the medication.  Drink enough water and fluids to keep your urine clear or pale yellow.  Avoid caffeine, tea, and carbonated beverages. They tend to irritate your bladder.  Empty your bladder often. Avoid holding urine for long periods of time.  After a bowel movement, women should cleanse from front to back. Use each tissue only once.  SEEK MEDICAL CARE IF:  You have back pain.  You develop a fever.  Your symptoms do not begin to resolve within 3 days.  SEEK IMMEDIATE MEDICAL CARE IF:  You have severe back pain or lower abdominal pain.  You develop chills.  You have nausea or vomiting.  You have continued burning or discomfort with urination.       PRINCIPAL DISCHARGE DIAGNOSIS  Diagnosis: Pulmonary embolism  Assessment and Plan of Treatment: You were admitted for chest pain, palpitation likey due to recent pulmonary embolism. EKG shows NSR, Chest xray clear. Monitored closely on cardiac monitor. You were followed by cardiologist and electrophysiologist to evaluate. Your heart rate elevated on exertion. w/ higher sensitivity to exercise, due to deconditioning with ankle injur and PE.  CT angio chest  did not demonste the previously seen pulmonary emboli. cardiac enzyme is normal.  No further cardiac work up is nessesary  as per cardiology.    Continue taking the Lovenox injections as prescribed once a day   Follow up with Hematologist Dr Hernandez and cardiology dr. Castellanos in 2weeks.  Please follow up with your PCP and repeat CBC within this week      SECONDARY DISCHARGE DIAGNOSES  Diagnosis: Instability of right ankle joint  Assessment and Plan of Treatment: You have the history of Right ankle fracture. You underwent MRI scan which showed multiple bone and ligament injuries. You were seen by the Podiatrist previous admission and missed appointment.   - You are advised to put Ankle ASO. and keep the right leg elevated.   -Full weight bearing left leg. weight bearing as tolerated on right lower leg.   - You are also advised Passive  range of motion of the ankle.   - You are advised to move around and you can put weight on the right ankle as you already had blood clot in the lungs due to immobility.   - You are advised to follow up with Podiatry Dr John in 1-2 weeks for further recommendations   - Follow up orthopedic dr. Goldberg to revaluate ligamentous findings and to determine duration of NWB status.    Diagnosis: UTI (urinary tract infection)  Assessment and Plan of Treatment: Your urine culture was negative for any UTI   HOME CARE INSTRUCTIONS  If you were prescribed antibiotics, take them exactly as your caregiver instructs you. Finish the medication even if you feel better after you have only taken some of the medication.  Drink enough water and fluids to keep your urine clear or pale yellow.  Avoid caffeine, tea, and carbonated beverages. They tend to irritate your bladder.  Empty your bladder often. Avoid holding urine for long periods of time.  After a bowel movement, women should cleanse from front to back. Use each tissue only once.  SEEK MEDICAL CARE IF:  You have back pain.  You develop a fever.  Your symptoms do not begin to resolve within 3 days.  SEEK IMMEDIATE MEDICAL CARE IF:  You have severe back pain or lower abdominal pain.  You develop chills.  You have nausea or vomiting.  You have continued burning or discomfort with urination.

## 2021-03-19 NOTE — PROGRESS NOTE ADULT - PROBLEM SELECTOR PLAN 3
Urinalysis positive  on ceftriaxone  f/u Urine Cx Urinalysis positive  on ceftriaxone - has not taken any dose yet will take 1st dose today   ID DR. Prieto consulted   f/u Urine Cx

## 2021-03-19 NOTE — DISCHARGE NOTE PROVIDER - PROVIDER TOKENS
FREE:[LAST:[Deep],FIRST:[Day],PHONE:[phone]],FAX:[(   )    -],FOLLOWUP:[1 week]],PROVIDER:[TOKEN:[[id number]],FOLLOWUP:[1 week]] PROVIDER:[TOKEN:[[id number]],FOLLOWUP:[1 week]],FREE:[LAST:[Deep],FIRST:[Day],PHONE:[phone]],FAX:[(   )    -],FOLLOWUP:[1 week]],PROVIDER:[TOKEN:[[id number]],FOLLOWUP:[1 week]],PROVIDER:[TOKEN:[[id number]]]

## 2021-03-19 NOTE — DISCHARGE NOTE PROVIDER - NSFOLLOWUPCLINICS_GEN_ALL_ED_FT
[facility]  Orthopedics  [address] [place] [address]  Phone: [phone]  Fax: [phone]  Follow Up Time:     [facility]  Podiatry/Wound Care  [address] [place] [address]  Phone: [phone]  Fax: [phone]  Follow Up Time:

## 2021-03-19 NOTE — DISCHARGE NOTE PROVIDER - NSDCQMPCI_CARD_ALL_CORE
Caller would like to discuss an/a Return call for medications. Writer advised caller of callback within 24-72 hours.    Patient Name: Hayden Banuelos  Caller Name: Patient\  Name of Facility: [facility]  Callback Number: [phone]  Best Availability: NA  Fax Number: NA  Additional Info: Patient would like a refill of azithromycin (ZITHROMAX Z-NORBERT) 250 MG if possible as his allergies have been progressively getting worse.  Allergy medication does not seem to be working.  Has an appointment with ENT next week and would this affect his visit.  Please advise.    Did you confirm the message with the caller?: yes    Thank you,  Judy Lundberg   No

## 2021-03-19 NOTE — CONSULT NOTE ADULT - ASSESSMENT
?UTI - u/a is not impressive and patient is asymptomatic.    Plan - can hold off antibiotics for now  if urine culture is negative , will not need any abxs at all.  She should not breast feed for at least 2 days as she got got IV contrast for her CT chest and I informed her of this.  reconsult prn. ?UTI - u/a is not impressive and patient is asymptomatic.    Plan - can hold off antibiotics for now  if urine culture is negative , will not need any abxs at all.  She should not breast feed for at least 2 days as she got  IV contrast for her CT chest and I informed her of this.  reconsult prn.

## 2021-03-19 NOTE — PROGRESS NOTE ADULT - SUBJECTIVE AND OBJECTIVE BOX
NP Note discussed with  Primary Attending    Patient is a 25y old  Female who presents with a chief complaint of DIZZINESS (18 Mar 2021 10:02)      INTERVAL HPI/OVERNIGHT EVENTS: no new complaints    MEDICATIONS  (STANDING):  cefTRIAXone   IVPB      cefTRIAXone   IVPB 1000 milliGRAM(s) IV Intermittent every 24 hours  enoxaparin Injectable 100 milliGRAM(s) SubCutaneous daily  sodium chloride 0.9%. 1000 milliLiter(s) (100 mL/Hr) IV Continuous <Continuous>    MEDICATIONS  (PRN):  acetaminophen   Tablet .. 650 milliGRAM(s) Oral every 6 hours PRN Mild Pain (1 - 3)  ketorolac   Injectable 15 milliGRAM(s) IV Push every 8 hours PRN Moderate Pain (4 - 6)      __________________________________________________  REVIEW OF SYSTEMS:    CONSTITUTIONAL: No fever,   EYES: no acute visual disturbances  NECK: No pain or stiffness  RESPIRATORY: No cough; No shortness of breath  CARDIOVASCULAR: No chest pain, no palpitations  GASTROINTESTINAL: No pain. No nausea or vomiting; No diarrhea   NEUROLOGICAL: No headache or numbness, no tremors  MUSCULOSKELETAL: No joint pain, no muscle pain  GENITOURINARY: no dysuria, no frequency, no hesitancy  PSYCHIATRY: no depression , no anxiety  ALL OTHER  ROS negative        Vital Signs Last 24 Hrs  T(C): 37 (19 Mar 2021 13:43), Max: 37.2 (18 Mar 2021 21:19)  T(F): 98.6 (19 Mar 2021 13:43), Max: 98.9 (18 Mar 2021 21:19)  HR: 89 (19 Mar 2021 13:43) (72 - 96)  BP: 101/70 (19 Mar 2021 13:43) (94/55 - 103/65)  BP(mean): --  RR: 17 (19 Mar 2021 13:43) (16 - 18)  SpO2: 96% (19 Mar 2021 13:43) (96% - 98%)    ________________________________________________  PHYSICAL EXAM:  GENERAL: NAD  HEENT: Normocephalic;  conjunctivae and sclerae clear; moist mucous membranes;   NECK : supple  CHEST/LUNG: Clear to auscultation bilaterally with good air entry   HEART: S1 S2  regular; no murmurs, gallops or rubs  ABDOMEN: Soft, Nontender, Nondistended; Bowel sounds present  EXTREMITIES: no cyanosis; no edema; no calf tenderness  SKIN: warm and dry; no rash  NERVOUS SYSTEM:  Awake and alert; Oriented  to place, person and time ; no new deficits    _________________________________________________  LABS:                        10.1   4.46  )-----------( 227      ( 19 Mar 2021 08:48 )             32.5     03-19    142  |  112<H>  |  8   ----------------------------<  77  3.7   |  25  |  0.56    Ca    8.4      19 Mar 2021 08:48  Phos  3.6     03-18  Mg     2.1     03-18    TPro  6.1  /  Alb  3.1<L>  /  TBili  0.8  /  DBili  x   /  AST  9<L>  /  ALT  30  /  AlkPhos  42  03-18        CAPILLARY BLOOD GLUCOSE            RADIOLOGY & ADDITIONAL TESTS:    Imaging Personally Reviewed:  YES/NO    Consultant(s) Notes Reviewed:   YES/ No    Care Discussed with Consultants :     Plan of care was discussed with patient and /or primary care giver; all questions and concerns were addressed and care was aligned with patient's wishes.     NP Note discussed with  Primary Attending    Patient is a 25y old  Female who presents with a chief complaint of DIZZINESS (18 Mar 2021 10:02)      INTERVAL HPI/OVERNIGHT EVENTS: no new complaints    MEDICATIONS  (STANDING):  cefTRIAXone   IVPB      cefTRIAXone   IVPB 1000 milliGRAM(s) IV Intermittent every 24 hours  enoxaparin Injectable 100 milliGRAM(s) SubCutaneous daily  sodium chloride 0.9%. 1000 milliLiter(s) (100 mL/Hr) IV Continuous <Continuous>    MEDICATIONS  (PRN):  acetaminophen   Tablet .. 650 milliGRAM(s) Oral every 6 hours PRN Mild Pain (1 - 3)  ketorolac   Injectable 15 milliGRAM(s) IV Push every 8 hours PRN Moderate Pain (4 - 6)      __________________________________________________  REVIEW OF SYSTEMS:    CONSTITUTIONAL: No fever,   EYES: no acute visual disturbances  NECK: No pain or stiffness  RESPIRATORY: No cough; No shortness of breath  CARDIOVASCULAR: No chest pain, no palpitations  GASTROINTESTINAL: No pain. No nausea or vomiting; No diarrhea   NEUROLOGICAL: No headache or numbness, no tremors  MUSCULOSKELETAL: No joint pain, no muscle pain  GENITOURINARY: no dysuria, no frequency, no hesitancy  PSYCHIATRY: no depression , no anxiety  ALL OTHER  ROS negative        Vital Signs Last 24 Hrs  T(C): 37 (19 Mar 2021 13:43), Max: 37.2 (18 Mar 2021 21:19)  T(F): 98.6 (19 Mar 2021 13:43), Max: 98.9 (18 Mar 2021 21:19)  HR: 89 (19 Mar 2021 13:43) (72 - 96)  BP: 101/70 (19 Mar 2021 13:43) (94/55 - 103/65)  BP(mean): --  RR: 17 (19 Mar 2021 13:43) (16 - 18)  SpO2: 96% (19 Mar 2021 13:43) (96% - 98%)    ________________________________________________  PHYSICAL EXAM:  GENERAL: NAD  HEENT: Normocephalic;  conjunctivae and sclerae clear; moist mucous membranes;   NECK : supple  CHEST/LUNG: Clear to auscultation bilaterally with good air entry   HEART: S1 S2  regular; no murmurs, gallops or rubs  ABDOMEN: Soft, Nontender, Nondistended; Bowel sounds present  EXTREMITIES: no cyanosis; no edema; no calf tenderness R ankle (+) ace wrap   SKIN: warm and dry; no rash  NERVOUS SYSTEM:  Awake and alert; Oriented  to place, person and time ; no new deficits    _________________________________________________  LABS:                        10.1   4.46  )-----------( 227      ( 19 Mar 2021 08:48 )             32.5     03-19    142  |  112<H>  |  8   ----------------------------<  77  3.7   |  25  |  0.56    Ca    8.4      19 Mar 2021 08:48  Phos  3.6     03-18  Mg     2.1     03-18    TPro  6.1  /  Alb  3.1<L>  /  TBili  0.8  /  DBili  x   /  AST  9<L>  /  ALT  30  /  AlkPhos  42  03-18        CAPILLARY BLOOD GLUCOSE            RADIOLOGY & ADDITIONAL TESTS:  < from: CT Angio Chest w/ IV Cont (03.17.21 @ 12:59) >    EXAM:  CT ANGIO CHEST (W)AW                             PROCEDURE DATE:  03/17/2021          INTERPRETATION:  CLINICAL INFORMATION: Chest pain with history of pulmonary embolism    COMPARISON: 3/5/2021    CONTRAST/COMPLICATIONS:  IV Contrast: Omnipaque 350 / 50 cc administered / 50 cc discarded.  Oral Contrast: NONE  Complications: None reported at time of study completion    PROCEDURE:  CT Angiography of the Chest.  Sagittal and coronal reformats were performed as well as 3D (MIP) reconstructions. Study is limited by suboptimal bolus timing.    FINDINGS:    LUNGS AND AIRWAYS: Patent central airways.  Lungs are clear.  PLEURA: No pleural effusion.  MEDIASTINUM AND ARVIND: No lymphadenopathy.  VESSELS: Within normal limits.  HEART: Heart size is normal. No pericardial effusion.  CHEST WALL AND LOWER NECK: Within normal limits.  VISUALIZED UPPER ABDOMEN: Within normal limits.  BONES: Within normal limits.    IMPRESSION:  Limited by bolus timing. No central pulmonary embolism seen. Cannot evaluate previously seen right segmental and subsegmental branches.    Acute pathology.            ANEUDY HERNÁNDEZ MD; Attending Radiologist  This document has been electronically signed. Mar 17 2021  3:37PM    < end of copied text >  < from: Xray Chest 2 Views PA/Lat (03.17.21 @ 09:53) >    EXAM:  XR CHEST PA LAT 2V                            PROCEDURE DATE:  03/17/2021          INTERPRETATION:  CLINICAL INDICATION: 25 years  Female with cp.    COMPARISON: 3/5/2021    PA and lateral views of the chest demonstrate the lungs to be clear. There is no pleural effusion. There is no pneumothorax.    The heart is normal in size. There is no mediastinal or hilar mass.  The pulmonary vasculature is normal.    The osseous structures are unremarkable.    IMPRESSION:    No acute infiltrate.            ANGELA FINLEY MD; Attending Radiologist  This document has been electronically signed. Mar 17 2021 10:14AM    < end of copied text >  < from: MR Ankle No Cont, Right (03.08.21 @ 12:22) >    EXAM:  MR ANKLE RT                            PROCEDURE DATE:  03/08/2021          INTERPRETATION:  Clinical Information: Right ankle dislocation.    Comparison: None.    Technique:  MRI of the right ankle.  Intravenous contrast: None.    Findings:    Bones: There is marked marrow edema within the posterior medial aspect of the tibial plafond which extends into the medial malleolus. There is consistent with a bone contusion. There is subchondral linear signal abnormality at the talar head whichis consistent with a subchondral trabecular fracture. There is marked marrow edema within the plantar aspect of the cuboid consistent with a marked bone contusion. There is mild edema within the anterior process of the calcaneus.    Joints: There is mild edema within the sinus Tarsi. Small ankle joint effusion.    Ligaments: There is a tear of the anterior talofibular ligament with a small ossific fragment suggestive of an avulsion fracture. There is a complete tear of the calcaneofibular ligament proximally. There is edema at the fibular aspect of the posterior talofibular ligament consistent with a low-grade sprain. There is a high-grade tear of the deep tibiotalar ligament and partial thickness tearing at the talocalcaneal and tibiospring ligaments.    Muscles and Tendons: Normal.    Plantar Fascia: Normal.    Neurovascular: Normal.    Soft Tissues: Mild subcutaneous edema at the ankle.    Impression:  Bone contusions involving the posterior medial tibial plafond and medial malleolus, the anterior process of the calcaneus, the cuboid, and subchondral trabecular fracture at the talar head. Suspected avulsion fracture at the fibular attachment of the anterior talofibular ligament with the ligament completely torn. CT scan can be performed for further evaluation if clinically warranted.    Complete tear of the calcaneofibular ligament.    High-grade partial-thickness tearing at the deep deltoid ligament and moderate grade partial thickness tearing at the superficial deltoid ligament.                        MITCH SAMUEL MD; Attending Radiologist  This document has been electronically signed. Mar  8 2021  5:36PM    < end of copied text >    Imaging Personally Reviewed:  YES    Consultant(s) Notes Reviewed:   YES    Care Discussed with Consultants : ID / cardiology     Plan of care was discussed with patient and /or primary care giver; all questions and concerns were addressed and care was aligned with patient's wishes.

## 2021-03-19 NOTE — DISCHARGE NOTE PROVIDER - NSDCFUADDINST_GEN_ALL_CORE_FT
WB status: FWB to LLE, WBAT to RLE   WB status: FWB to LLE, WBAT to RLE  Please follow up with your PCP within this week itself   Please repeat CBC with Diff within this week

## 2021-03-19 NOTE — PROGRESS NOTE ADULT - PROBLEM SELECTOR PLAN 1
p/w Chest pain likely due to Pulmonary embolism  CXR clear  Troponin negative  EKG NSR   Tele monitoring  c/w lovenox 100 mg sc daily  Pain medications as needed  Dr Castellanos cardio consulted p/w Chest pain likely due to Pulmonary embolism  CXR clear  Troponin negative  EKG NSR   Tele monitoring  c/w lovenox 100 mg sc daily  Pain medications as needed  Dr Castellanos cardio consulted - + orthostatic and concerning for PAT, consulted EP - likely due to prolonged debility, if stable d/c home  if tachy noted -- will obtain EKG

## 2021-03-19 NOTE — CONSULT NOTE ADULT - SUBJECTIVE AND OBJECTIVE BOX
HPI:  25y Female with PMH of Anemia, herpes presented with chest pain since morning. Pt reports sudden onset, 5/10, sharp left sided chest pain since 5 AM. Pt was recently admitted to [facility] for Pulmonary embolism and was discharged on Lovenox. Pt denies any fever, palpitations, shortness of breath, N/V/D, abdominal pain, urinary symptoms or any other acute complaints.          PAST MEDICAL & SURGICAL HISTORY:  Pulmonary embolism    Anemia    No significant past surgical history        acyclovir (Swelling)      Meds:  acetaminophen   Tablet .. 650 milliGRAM(s) Oral every 6 hours PRN  cefTRIAXone   IVPB      cefTRIAXone   IVPB 1000 milliGRAM(s) IV Intermittent every 24 hours  enoxaparin Injectable 100 milliGRAM(s) SubCutaneous daily  ketorolac   Injectable 15 milliGRAM(s) IV Push every 8 hours PRN  sodium chloride 0.9%. 1000 milliLiter(s) IV Continuous <Continuous>      SOCIAL HISTORY:  Smoker:   ETOH use:        FAMILY HISTORY:      VITALS:  Vital Signs Last 24 Hrs  T(C): 37 (19 Mar 2021 13:43), Max: 37.2 (18 Mar 2021 21:19)  T(F): 98.6 (19 Mar 2021 13:43), Max: 98.9 (18 Mar 2021 21:19)  HR: 89 (19 Mar 2021 13:43) (72 - 96)  BP: 101/70 (19 Mar 2021 13:43) (95/60 - 103/65)  BP(mean): --  RR: 17 (19 Mar 2021 13:43) (16 - 18)  SpO2: 96% (19 Mar 2021 13:43) (96% - 98%)    LABS/DIAGNOSTIC TESTS:                          10.1   4.46  )-----------( 227      ( 19 Mar 2021 08:48 )             32.5     WBC Count: 4.46 K/uL (03-19 @ 08:48)  WBC Count: 4.58 K/uL (03-18 @ 07:48)  WBC Count: 4.83 K/uL (03-17 @ 09:25)      03-19    142  |  112<H>  |  8   ----------------------------<  77  3.7   |  25  |  0.56    Ca    8.4      19 Mar 2021 08:48  Phos  3.6     03-18  Mg     2.1     03-18    TPro  6.1  /  Alb  3.1<L>  /  TBili  0.8  /  DBili  x   /  AST  9<L>  /  ALT  30  /  AlkPhos  42  03-18          LIVER FUNCTIONS - ( 18 Mar 2021 07:47 )  Alb: 3.1 g/dL / Pro: 6.1 g/dL / ALK PHOS: 42 U/L / ALT: 30 U/L DA / AST: 9 U/L / GGT: x                 LACTATE:    ABG -     CULTURES:   .Blood Blood-Peripheral  03-18 @ 18:34   No growth to date.  --  --      .Blood Blood-Peripheral  03-18 @ 18:15   No growth to date.  --  --          RADIOLOGY:< from: CT Angio Chest w/ IV Cont (03.17.21 @ 12:59) >  EXAM:  CT ANGIO CHEST (W)AW IC                            PROCEDURE DATE:  03/17/2021          INTERPRETATION:  CLINICAL INFORMATION: Chest pain with history of pulmonary embolism    COMPARISON: 3/5/2021    CONTRAST/COMPLICATIONS:  IV Contrast: Omnipaque 350 / 50 cc administered / 50 cc discarded.  Oral Contrast: NONE  Complications: None reported at time of study completion    PROCEDURE:  CT Angiography of the Chest.  Sagittal and coronal reformats were performed as well as 3D (MIP) reconstructions. Study is limited by suboptimal bolus timing.    FINDINGS:    LUNGS AND AIRWAYS: Patent central airways.  Lungs are clear.  PLEURA: No pleural effusion.  MEDIASTINUM AND ARVIND: No lymphadenopathy.  VESSELS: Within normal limits.  HEART: Heart size is normal. No pericardial effusion.  CHEST WALL AND LOWER NECK: Within normal limits.  VISUALIZED UPPER ABDOMEN: Within normal limits.  BONES: Within normal limits.    IMPRESSION:  Limited by bolus timing. No central pulmonary embolism seen. Cannot evaluate previously seen right segmental and subsegmental branches.    Acute pathology.            ANEUDY HERNÁNDEZ MD; Attending Radiologist  This document has been electronically signed. Mar 17 2021  3:37PM    < end of copied text >        ROS  [  ] UNABLE TO ELICIT               HPI:  25y Female with PMH of Anemia, herpes presented with chest pain since morning. Pt reports sudden onset, 5/10, sharp left sided chest pain since 5 AM. Pt was recently admitted to [facility] for Pulmonary embolism and was discharged on Lovenox. Pt denies any fever, palpitations, shortness of breath, N/V/D, abdominal pain, urinary symptoms or any other acute complaints.    History as above, pt who is breastfeeding her 2 year old child and who presented with chest pain and had a slightly positive u/a but has no urinary symptoms. She was put on Rocephin but has been refusing to take it as she  is afraid it might harm her child. She has no fevers or chills.      PAST MEDICAL & SURGICAL HISTORY:  Pulmonary embolism    Anemia    No significant past surgical history        acyclovir (Swelling)      Meds:  acetaminophen   Tablet .. 650 milliGRAM(s) Oral every 6 hours PRN  cefTRIAXone   IVPB      cefTRIAXone   IVPB 1000 milliGRAM(s) IV Intermittent every 24 hours  enoxaparin Injectable 100 milliGRAM(s) SubCutaneous daily  ketorolac   Injectable 15 milliGRAM(s) IV Push every 8 hours PRN  sodium chloride 0.9%. 1000 milliLiter(s) IV Continuous <Continuous>      SOCIAL HISTORY:  Smoker: no   ETOH use:  no      FAMILY HISTORY: not contributory      VITALS:  Vital Signs Last 24 Hrs  T(C): 37 (19 Mar 2021 13:43), Max: 37.2 (18 Mar 2021 21:19)  T(F): 98.6 (19 Mar 2021 13:43), Max: 98.9 (18 Mar 2021 21:19)  HR: 89 (19 Mar 2021 13:43) (72 - 96)  BP: 101/70 (19 Mar 2021 13:43) (95/60 - 103/65)  BP(mean): --  RR: 17 (19 Mar 2021 13:43) (16 - 18)  SpO2: 96% (19 Mar 2021 13:43) (96% - 98%)    LABS/DIAGNOSTIC TESTS:                          10.1   4.46  )-----------( 227      ( 19 Mar 2021 08:48 )             32.5     WBC Count: 4.46 K/uL (03-19 @ 08:48)  WBC Count: 4.58 K/uL (03-18 @ 07:48)  WBC Count: 4.83 K/uL (03-17 @ 09:25)      03-19    142  |  112<H>  |  8   ----------------------------<  77  3.7   |  25  |  0.56    Ca    8.4      19 Mar 2021 08:48  Phos  3.6     03-18  Mg     2.1     03-18    TPro  6.1  /  Alb  3.1<L>  /  TBili  0.8  /  DBili  x   /  AST  9<L>  /  ALT  30  /  AlkPhos  42  03-18          LIVER FUNCTIONS - ( 18 Mar 2021 07:47 )  Alb: 3.1 g/dL / Pro: 6.1 g/dL / ALK PHOS: 42 U/L / ALT: 30 U/L DA / AST: 9 U/L / GGT: x                 LACTATE:    ABG -     CULTURES:   .Blood Blood-Peripheral  03-18 @ 18:34   No growth to date.  --  --      .Blood Blood-Peripheral  03-18 @ 18:15   No growth to date.  --  --          RADIOLOGY:< from: CT Angio Chest w/ IV Cont (03.17.21 @ 12:59) >  EXAM:  CT ANGIO CHEST (W)AW IC                            PROCEDURE DATE:  03/17/2021          INTERPRETATION:  CLINICAL INFORMATION: Chest pain with history of pulmonary embolism    COMPARISON: 3/5/2021    CONTRAST/COMPLICATIONS:  IV Contrast: Omnipaque 350 / 50 cc administered / 50 cc discarded.  Oral Contrast: NONE  Complications: None reported at time of study completion    PROCEDURE:  CT Angiography of the Chest.  Sagittal and coronal reformats were performed as well as 3D (MIP) reconstructions. Study is limited by suboptimal bolus timing.    FINDINGS:    LUNGS AND AIRWAYS: Patent central airways.  Lungs are clear.  PLEURA: No pleural effusion.  MEDIASTINUM AND ARVIND: No lymphadenopathy.  VESSELS: Within normal limits.  HEART: Heart size is normal. No pericardial effusion.  CHEST WALL AND LOWER NECK: Within normal limits.  VISUALIZED UPPER ABDOMEN: Within normal limits.  BONES: Within normal limits.    IMPRESSION:  Limited by bolus timing. No central pulmonary embolism seen. Cannot evaluate previously seen right segmental and subsegmental branches.    Acute pathology.            ANEUDY HERNÁNDEZ MD; Attending Radiologist  This document has been electronically signed. Mar 17 2021  3:37PM    < end of copied text >        ROS  [  ] UNABLE TO ELICIT

## 2021-03-19 NOTE — DISCHARGE NOTE PROVIDER - CARE PROVIDER_API CALL
Day Adame  Phone: [phone]  Fax: (   )    -  Follow Up Time: 1 week    Goldberg, Mark E ()  [facility]  [address] [place] [address]  Phone: [phone]  Fax: [phone]  Follow Up Time: 1 week   Goldberg, Mark E ()  [facility]  [address] [place] [address]  Phone: [phone]  Fax: [phone]  Follow Up Time: 1 week    Day Adame  Phone: [phone]  Fax: (   )    -  Follow Up Time: 1 week    Emir Castellanos)  Cardiovascular Disease  [address] [place] [address]  Phone: [phone]  Fax: [phone]  Follow Up Time: 1 week    Don Hernandez  INTERNAL MEDICINE  [address] [place] [place] [address]  Phone: [phone]  Fax: [phone]  Follow Up Time:

## 2021-03-19 NOTE — DISCHARGE NOTE PROVIDER - NSDCFUSCHEDAPPT_GEN_ALL_CORE_FT
[facility] ; 04/08/2021 ; NPP Cardio [address]  SAMIR [facility] ; 04/14/2021 ; NPP Orthosurg [address] [facility] RAEGAN DAWSON ; 04/14/2021 ; NPP Orthosurg [address]  RAEGAN DAWSON ; 05/17/2021 ; NPP Cardio [address]

## 2021-03-19 NOTE — PROGRESS NOTE ADULT - ASSESSMENT
25y Female with PMH of Anemia  presented with sudden onset, 5/10, sharp left sided chest. Pt was recently admitted to [facility] for Pulmonary embolism and was discharged on Lovenox.   Admitted for chest pain likely due to PE   UA mildly positive, started on ceftriaxone, pending urine culture   Pt seen at bedside, states is feeling better, no chest pain. Monitored on telemetry with period of sinus tachycardia to 120s, mildly hypotensive 95/55   Cardiology consulted    25y Female with PMH of Anemia  presented with sudden onset, 5/10, sharp left sided chest. Pt was recently admitted to [facility] for Pulmonary embolism and was discharged on Lovenox.   Admitted for chest pain likely due to PE   UA mildly positive, started on ceftriaxone, pending urine culture   3/18 Pt seen at bedside, states is feeling better, no chest pain. Monitored on telemetry with period of sinus tachycardia to 120s, mildly hypotensive 95/55   Cardiology consulted   3/19 stable AO X 4. no c/o chest pain, seen by cardiology and EP today, cont tele for now and if stable d/c tomorrow

## 2021-03-19 NOTE — CONSULT NOTE ADULT - SUBJECTIVE AND OBJECTIVE BOX
EP Attending    HISTORY OF PRESENT ILLNESS:  25y Female with PMH of Anemia, herpes presented with chest pain since morning. Pt reports sudden onset, 5/10, sharp left sided chest pain since 5 AM. Pt was recently admitted to [facility] for Pulmonary embolism and was discharged on Lovenox. Pt denies any fever, palpitations, shortness of breath, N/V/D, abdominal pain, urinary symptoms or any other acute complaints.    In ED, /62, HR 82 (17 Mar 2021 15:00)    3/19- seen at bedside.  Hx of ankle fracture earlier this year, followed by long period of immobilization resulting in a DVT/PE.  She has mild anemia with Hgb of 10.  Feels short of breath and easily fatigued doing light activity around the house.  No angina or orthopnea, no fainting.  Is aware that her heartrate goes up to ~140-150bpm when walking around, as her nurses tell her about this.  Prior to ankle fracture and PE, she describes herself as sedentary but healthy, with no prior problems.  Does not drink or smoke or take an OTC medications, and did not take oral contraceptive pills.  She is on treatment dose Lovenox for her PE, as it does not transmit thru breast milk- she is breast feeding her 1yo son.  A 10 pt ROS is otherwise negative.    PAST MEDICAL & SURGICAL HISTORY:  Pulmonary embolism    Anemia    No significant past surgical history      MEDICATIONS  (STANDING):  cefTRIAXone   IVPB      cefTRIAXone   IVPB 1000 milliGRAM(s) IV Intermittent every 24 hours  enoxaparin Injectable 100 milliGRAM(s) SubCutaneous daily  sodium chloride 0.9%. 1000 milliLiter(s) (100 mL/Hr) IV Continuous <Continuous>      Allergies    acyclovir (Swelling)    Intolerances    FAMILY HISTORY:    Non-contributary for premature coronary disease or sudden cardiac death    SOCIAL HISTORY:    [ x] Non-smoker  [ ] Smoker  [ ] Alcohol    PHYSICAL EXAM:  T(C): 36.9 (03-19-21 @ 05:11), Max: 37.2 (03-18-21 @ 21:19)  HR: 72 (03-19-21 @ 05:11) (72 - 96)  BP: 95/60 (03-19-21 @ 05:11) (94/55 - 103/65)  RR: 18 (03-19-21 @ 05:11) (16 - 18)  SpO2: 97% (03-19-21 @ 05:11) (97% - 98%)  Wt(kg): --    General: Well nourished, no acute distress, alert and oriented x 3  Head: normocephalic, no trauma  Neck: no JVD, no bruit, supple, not enlarged  CV: S1S2, no S3, regular rate, rhythm is SINUS, no murmurs.    Lungs: clear BL, no rales or wheezes  Abdomen: bowel sounds +, soft, nontender, nondistended  Extremities: no clubbing, cyanosis or edema  Neuro: Moves all 4 extremities, sensation intact x 4 extremities  Skin: warm and moist, normal turgor  Psych: Mood and affect are appropriate for circumstances  MSK: normal range of motion and strength x4 extremities.           TELEMETRY: NSR w/ episodes of sinus tachycardia	    ECG:  same  Echo: normal LV EF, normal RV function  CTA: poor bolus timing, no central PE.  MRI ankle: multiple significant ligamentous injuries, possible small avulsion fracture.	  	  LABS:	 	                          10.1   4.46  )-----------( 227      ( 19 Mar 2021 08:48 )      32.5     03-19    142  |  112<H>  |  8   ----------------------------<  77  3.7   |  25  |  0.56    Ca    8.4      19 Mar 2021 08:48  Phos  3.6     03-18  Mg     2.1     03-18    TPro  6.1  /  Alb  3.1<L>  /  TBili  0.8  /  DBili  x   /  AST  9<L>  /  ALT  30  /  AlkPhos  42  03-18    ASSESSMENT/PLAN: 	25y Female with prior PE after ankle injury.  With tachycardia on exertion.  This is occurring after significant non-weight-bearing on her right leg, and she does feel somewhat short of breath on exertion.  No fainting or palpitations.  Rhythm looks like sinus tachycardia, w/ higher sensitivity to exercise, due to deconditioning.  Paroxysmal atrial ectopic tachycardia is felt to be unlikely.  POTS unlikely in the setting of recent onset and immobilization.    Please consider repeat ortho eval re: ligamentous findings, to determine duration of NWB status and to guide PT/Rehab vs repair.    Marc Barnes M.D.  Cardiac Electrophysiology    office [phone]  pager [phone]

## 2021-03-19 NOTE — DISCHARGE NOTE PROVIDER - HOSPITAL COURSE
25y Female with PMH of Anemia, herpes, recent PE (discharge with lovenox on 3/10/21 from [facility], pt cannot take Eliquis as pt is breastfeeding) after being immobile due to an ankle injury presented with chest pain, palpitation since morning of admission.  Recent echo (3/9/2021) with no structural heart disease. Pt is admitted for evaluation chest pain likely due to recent PE.  EKG shows NSR, CXR clear.  Monitored closely on telemetry floor. Noted tachycardia with SOB on exertion and occurring after non weight bearing on right leg.  CT angio chest shows no central PE. Trop negative x 2. TSH within gelacio limits. Pt was followed by cardiology and dr. Barnes for EP eval. Her HR rhythm looks like sinus tachycardia, w/ higher sensitivity to exercise, due to deconditioning. SBP remains bet. 90s and 100s. Paroxysmal atrial ectopic tachycardia is felt to be unlikely. Postural orthostatic tachycardia syndrome unlikely in the setting of recent onset and immobilization as per dr. Barnes. Pt with recent Right ankle injury with fracture, MRI from previous admission showed multiple bone contusions with subchondral trabecular fracture and ligamentous injury. Never followed outpt followups. Pt was advised to follow up outpt podiatry and orthopedic to revaluate ligamentous findings and to determine duration of NWB status.  Pt UA is positive. Started Rocephin, Urine and blood cx testing. Remains afebrile with no leukocytosis. PT consulted. -----rec      Pt is clinically improving and optimized for discharge home with outpt follow ups. Please note this is a brief summary of hospitalization and refer to medical records/daily progress note/results for completed hospital course.       incomplete 3/19           25y Female with PMH of Anemia, herpes, recent PE (discharge with lovenox on 3/10/21 from [facility], pt cannot take Eliquis as pt is breastfeeding) after being immobile due to an ankle injury presented with chest pain, palpitation since morning of admission.  Recent echo (3/9/2021) with no structural heart disease. Pt is admitted for evaluation chest pain likely due to recent PE.  EKG shows NSR, CXR clear.  Monitored closely on telemetry floor. Noted tachycardia with SOB on exertion and occurring after non weight bearing on right leg.  CT angio chest shows no central PE. Trop negative x 2. TSH within gelacio limits. Pt was followed by cardiology and dr. Barnes for EP eval. Her HR rhythm looks like sinus tachycardia, w/ higher sensitivity to exercise, due to deconditioning. SBP remains bet. 90s and 100s. Paroxysmal atrial ectopic tachycardia is felt to be unlikely. Postural orthostatic tachycardia syndrome unlikely in the setting of recent onset and immobilization as per dr. Barnes.  no further cardiac work up as per cardiology. Pt with recent Right ankle injury with fracture, MRI from previous admission showed multiple bone contusions with subchondral trabecular fracture and ligamentous injury. Never followed outpt followups. Pt was advised to follow up outpt podiatry and orthopedic to revaluate ligamentous findings and to determine duration of NWB status.  Pt UA is positive. Started Rocephin, Urine and blood cx testing. Remains afebrile with no leukocytosis. PT consulted. -----rec      Pt is clinically improving and optimized for discharge home with outpt follow ups. Please note this is a brief summary of hospitalization and refer to medical records/daily progress note/results for completed hospital course.       incomplete 3/19           25y Female with PMH of Anemia, herpes, recent PE (discharge with lovenox on 3/10/21 from [facility], pt cannot take Eliquis as pt is breastfeeding) after being immobile due to an ankle injury presented with chest pain, palpitation since morning of admission.  Recent echo (3/9/2021) with no structural heart disease. Pt is admitted for evaluation chest pain likely due to recent PE.  EKG shows NSR, CXR clear.  Monitored closely on telemetry floor. Noted tachycardia with SOB on exertion and occurring after non weight bearing on right leg.  CT angio chest shows no central PE. Trop negative x 2. TSH within gelacio limits. Pt was followed by cardiology and dr. Barnes for EP eval. Her HR rhythm looks like sinus tachycardia, w/ higher sensitivity to exercise, due to deconditioning. SBP remains bet. 90s and 100s. Paroxysmal atrial ectopic tachycardia is felt to be unlikely. Postural orthostatic tachycardia syndrome unlikely in the setting of recent onset and immobilization as per dr. Barnes.  no further cardiac work up as per cardiology. Pt with recent Right ankle injury with fracture, MRI from previous admission showed multiple bone contusions with subchondral trabecular fracture and ligamentous injury. Never followed outpt followups. Pt was advised to follow up outpt podiatry and orthopedic to revaluate ligamentous findings and to determine duration of NWB status.  Pt UA is positive. Started Rocephin, Urine and blood cx testing. Remains afebrile with no leukocytosis. PT consulted. -----rec      Pt is clinically improving and optimized for discharge home with outpt follow ups. Please note this is a brief summary of hospitalization and refer to medical records/daily progress note/results for completed hospital course.       incomplete 3/19               25y Female with PMH of Anemia, herpes, recent PE (discharge with lovenox on 3/10/21 from [facility], pt cannot take Eliquis as pt is breastfeeding) after being immobile due to an ankle injury presented with chest pain, palpitation since morning of admission.  Recent echo (3/9/2021) with no structural heart disease. Pt is admitted for evaluation chest pain likely due to recent PE.  EKG shows NSR, CXR clear.  Monitored closely on telemetry floor. Noted tachycardia with SOB on exertion and occurring after non weight bearing on right leg.  CT angio chest shows no central PE. Trop negative x 2. TSH within gelacio limits. Pt was followed by cardiology and dr. Barnes for EP eval. Her HR rhythm looks like sinus tachycardia, w/ higher sensitivity to exercise, due to deconditioning. SBP remains bet. 90s and 100s. Paroxysmal atrial ectopic tachycardia is felt to be unlikely. Postural orthostatic tachycardia syndrome unlikely in the setting of recent onset and immobilization as per dr. Barnes.  no further cardiac work up as per cardiology. Pt with recent Right ankle injury with fracture, MRI from previous admission showed multiple bone contusions with subchondral trabecular fracture and ligamentous injury. Never followed outpt followups. Pt was advised to follow up outpt podiatry and orthopedic to revaluate ligamentous findings and to determine duration of NWB status.  Pt UA is positive. Urine culture negative. Patient did not take any antibiotics as she is currently breast feeding. She knows not to breast feed for 2 days since she received IV Contrast      Pt is clinically improving and optimized for discharge home with outpt follow ups. Please note this is a brief summary of hospitalization and refer to medical records/daily progress note/results for completed hospital course.

## 2021-03-19 NOTE — PROGRESS NOTE ADULT - PROBLEM SELECTOR PLAN 4
injured few months ago  seen by podiatry on last admission - advised to have ace wrap  for support and f/u with ortho Dr Goldberg  no changes  f/u ortho/ podiatry as out-pt

## 2021-03-20 RX ORDER — ACETAMINOPHEN 500 MG
2 TABLET ORAL
Qty: 0 | Refills: 0 | DISCHARGE
Start: 2021-03-20

## 2021-03-20 RX ADMIN — ENOXAPARIN SODIUM 100 MILLIGRAM(S): 100 INJECTION SUBCUTANEOUS at 12:45

## 2021-03-20 NOTE — PROGRESS NOTE ADULT - SUBJECTIVE AND OBJECTIVE BOX
pt seen and examined, no complaints, ROS - .     acetaminophen   Tablet .. 650 milliGRAM(s) Oral every 6 hours PRN  cefTRIAXone   IVPB      cefTRIAXone   IVPB 1000 milliGRAM(s) IV Intermittent every 24 hours  enoxaparin Injectable 100 milliGRAM(s) SubCutaneous daily  ketorolac   Injectable 15 milliGRAM(s) IV Push every 8 hours PRN  sodium chloride 0.9%. 1000 milliLiter(s) IV Continuous <Continuous>                            10.1   4.46  )-----------( 227      ( 19 Mar 2021 08:48 )             32.5       Hemoglobin: 10.1 g/dL (03-19 @ 08:48)  Hemoglobin: 10.8 g/dL (03-18 @ 07:48)  Hemoglobin: 11.7 g/dL (03-17 @ 09:25)      03-19    142  |  112<H>  |  8   ----------------------------<  77  3.7   |  25  |  0.56    Ca    8.4      19 Mar 2021 08:48  Phos  3.6     03-18  Mg     2.1     03-18    TPro  6.1  /  Alb  3.1<L>  /  TBili  0.8  /  DBili  x   /  AST  9<L>  /  ALT  30  /  AlkPhos  42  03-18    Creatinine Trend: 0.56<--, 0.62<--, 0.59<--, 0.63<--, 0.63<--, 0.43<--    COAGS:     CARDIAC MARKERS ( 17 Mar 2021 17:49 )  <0.015 ng/mL / x     / x     / x     / x      CARDIAC MARKERS ( 17 Mar 2021 09:25 )  <0.015 ng/mL / x     / x     / x     / x            T(C): 36.9 (03-20-21 @ 01:00), Max: 37 (03-19-21 @ 13:43)  HR: 77 (03-20-21 @ 01:00) (77 - 89)  BP: 99/57 (03-20-21 @ 01:00) (99/51 - 101/70)  RR: 19 (03-20-21 @ 01:00) (16 - 19)  SpO2: 99% (03-20-21 @ 01:00) (96% - 100%)  Wt(kg): --    I&O's Summary    CONTRAST/COMPLICATIONS:  IV Contrast: Omnipaque 350 / 50 cc administered / 50 cc discarded.  Oral Contrast: NONE  Complications: None reported at time of study completion    PROCEDURE:  CT Angiography of the Chest.  Sagittal and coronal reformats were performed as well as 3D (MIP) reconstructions. Study is limited by suboptimal bolus timing.    FINDINGS:    LUNGS AND AIRWAYS: Patent central airways.  Lungs are clear.  PLEURA: No pleural effusion.  MEDIASTINUM AND ARVIND: No lymphadenopathy.  VESSELS: Within normal limits.  HEART: Heart size is normal. No pericardial effusion.  CHEST WALL AND LOWER NECK: Within normal limits.  VISUALIZED UPPER ABDOMEN: Within normal limits.  BONES: Within normal limits.    IMPRESSION:  Limited by bolus timing. No central pulmonary embolism seen. Cannot evaluate previously seen right segmental and subsegmental branches.    Acute pathology.      ANEUDY HERNÁNDEZ MD; Attending Radiologist  This document has been electronically signed. Mar 17 2021  3:37PM    ASSESSMENT/PLAN: 	25y Female PMH of Anemia, recent PE after being immobile due to an ankle injury presented with chest pain, palpitations since morning of admission. recent echo with no structural heart disease.    A/C with lovenox at present  Tele stable , HR stable.   ABX per medicine   Pain management   EPS follow up    GI / DVT prophylaxis.   keep K>4, mag >2.0   Physical therapy follow up

## 2021-03-20 NOTE — PHYSICAL THERAPY INITIAL EVALUATION ADULT - RANGE OF MOTION EXAMINATION, REHAB EVAL
except for right ankle DF to neutral; inv/eversion ~ 5 deg with c/o discomfort during movement/bilateral upper extremity ROM was WNL (within normal limits)/bilateral lower extremity was ROM was WNL (within normal limits)

## 2021-03-20 NOTE — PROGRESS NOTE ADULT - SUBJECTIVE AND OBJECTIVE BOX
Patient is a 25y old  Female who presents with a chief complaint of chest pain (19 Mar 2021 14:21)    PATIENT IS SEEN AND EXAMINED IN MEDICAL FLOOR. REPORTS TRANSIENT EPISODE OF CHEST PAIN OVERNIGHT - SELF-LIMITED.    ALLERGIES:  acyclovir (Swelling)    VITALS:    Vital Signs Last 24 Hrs  T(C): 37 (20 Mar 2021 13:25), Max: 37 (20 Mar 2021 13:25)  T(F): 98.6 (20 Mar 2021 13:25), Max: 98.6 (20 Mar 2021 13:25)  HR: 83 (20 Mar 2021 13:25) (70 - 83)  BP: 104/60 (20 Mar 2021 13:25) (96/60 - 110/62)  BP(mean): --  RR: 18 (20 Mar 2021 13:25) (16 - 19)  SpO2: 99% (20 Mar 2021 13:25) (96% - 100%)    LABS:    CBC Full  -  ( 19 Mar 2021 08:48 )  WBC Count : 4.46 K/uL  RBC Count : 3.78 M/uL  Hemoglobin : 10.1 g/dL  Hematocrit : 32.5 %  Platelet Count - Automated : 227 K/uL  Mean Cell Volume : 86.0 fl  Mean Cell Hemoglobin : 26.7 pg  Mean Cell Hemoglobin Concentration : 31.1 gm/dL  Auto Neutrophil # : x  Auto Lymphocyte # : x  Auto Monocyte # : x  Auto Eosinophil # : x  Auto Basophil # : x  Auto Neutrophil % : x  Auto Lymphocyte % : x  Auto Monocyte % : x  Auto Eosinophil % : x  Auto Basophil % : x      03-19    142  |  112<H>  |  8   ----------------------------<  77  3.7   |  25  |  0.56    Ca    8.4      19 Mar 2021 08:48      CAPILLARY BLOOD GLUCOSE      Creatinine Trend: 0.56<--, 0.62<--, 0.59<--, 0.63<--, 0.63<--, 0.43<--  I&O's Summary      .Urine Clean Catch (Midstream)  03-18 @ 22:03   <10,000 CFU/mL Normal Urogenital Raquel  --  --      .Blood Blood-Peripheral  03-18 @ 18:34   No growth to date.  --  --      .Blood Blood-Peripheral  03-18 @ 18:15   No growth to date.  --  --      MEDICATIONS:    MEDICATIONS  (STANDING):  enoxaparin Injectable 100 milliGRAM(s) SubCutaneous daily  sodium chloride 0.9%. 1000 milliLiter(s) (100 mL/Hr) IV Continuous <Continuous>      MEDICATIONS  (PRN):  acetaminophen   Tablet .. 650 milliGRAM(s) Oral every 6 hours PRN Mild Pain (1 - 3)  ketorolac   Injectable 15 milliGRAM(s) IV Push every 8 hours PRN Moderate Pain (4 - 6)    REVIEW OF SYSTEMS:                           ALL ROS DONE [ X   ]    CONSTITUTIONAL:  LETHARGIC [   ], FEVER [   ], UNRESPONSIVE [   ]  CVS:  CP  [   ], SOB, [   ], PALPITATIONS [   ], DIZZYNESS [   ]  RS: COUGH [   ], SPUTUM [   ]  GI: ABDOMINAL PAIN [   ], NAUSEA [   ], VOMITINGS [   ], DIARRHEA [   ], CONSTIPATION [   ]  :  DYSURIA [   ], NOCTURIA [   ], INCREASED FREQUENCY [   ], DRIBLING [   ],  SKELETAL: PAINFUL JOINTS [   ], SWOLLEN JOINTS [   ], NECK ACHE [   ], LOW BACK ACHE [   ],  SKIN : ULCERS [   ], RASH [   ], ITCHING [   ]  CNS: HEAD ACHE [   ], DOUBLE VISION [   ], BLURRED VISION [   ], AMS / CONFUSION [   ], SEIZURES [   ], WEAKNESS [   ],TINGLING / NUMBNESS [   ]    PHYSICAL EXAMINATION:  GENERAL APPEARANCE: NO DISTRESS  HEENT:  NO PALLOR, NO  JVD,  NO   NODES, NECK SUPPLE  CVS: S1 +, S2 +,   RS: AEEB,  OCCASIONAL  RALES +,   NO RONCHI  ABD: SOFT, NT, NO, BS +  EXT: NO PE  SKIN: WARM,     SKELETAL:  ROM ACCEPTABLE     R ANKLE WRAPPED W/ COMPRESSION ACE WRAPPING  CNS:  AAO X 3 ,   DEFICITS    RADIOLOGY :    EXAM:  CT ANGIO CHEST (W)AW IC                            PROCEDURE DATE:  03/17/2021          INTERPRETATION:  CLINICAL INFORMATION: Chest pain with history of pulmonary embolism    COMPARISON: 3/5/2021    CONTRAST/COMPLICATIONS:  IV Contrast: Omnipaque 350 / 50 cc administered / 50 cc discarded.  Oral Contrast: NONE  Complications: None reported at time of study completion    PROCEDURE:  CT Angiography of the Chest.  Sagittal and coronal reformats were performed as well as 3D (MIP) reconstructions. Study is limited by suboptimal bolus timing.    FINDINGS:    LUNGS AND AIRWAYS: Patent central airways.  Lungs are clear.  PLEURA: No pleural effusion.  MEDIASTINUM AND ARVIND: No lymphadenopathy.  VESSELS: Within normal limits.  HEART: Heart size is normal. No pericardial effusion.  CHEST WALL AND LOWER NECK: Within normal limits.  VISUALIZED UPPER ABDOMEN: Within normal limits.  BONES: Within normal limits.    IMPRESSION:  Limited by bolus timing. No central pulmonary embolism seen. Cannot evaluate previously seen right segmental and subsegmental branches.    Acute pathology.    ==========================================================      ASSESSMENT :     Other pulmonary embolism without acute cor pulmonale    Pulmonary embolism    Anemia    No pertinent past medical history    No significant past surgical history        PLAN:  HPI:  25y Female with PMH of Anemia, herpes presented with chest pain since morning. Pt reports sudden onset, 5/10, sharp left sided chest pain since 5 AM. Pt was recently admitted to [facility] for Pulmonary embolism and was discharged on Lovenox. Pt denies any fever, palpitations, shortness of breath, N/V/D, abdominal pain, urinary symptoms or any other acute complaints.    In ED, /62, HR 82 (17 Mar 2021 15:00)    # REPORTS EPISODE OF LIGHTHEADEDNESS - REVIEWED ORTHOSTATIC VITALS, S/P IVF, EP AND CARDIOLOGY CONSULT - SUGGEST THIS IS S/T DECONDITIONING AND RECOMMEND OUTPATIENT PT [REVIEWED PT EVALUATION] AND F/U WITH CARDIOLOGY FOR FURTHER EVALUATION  # SINUS TACHYCARDIA - INTERMITTENTLY ON STANDING - ON TELEMETRY, S/P IVF, UCX - NGTD AND BCX - NGTD  # PULMONARY EMBOLISM RECENTLY  PLACED ON LOVENOX NOW W/ SUSPECTED PLEURITIC CHEST PAIN - REVIEWED REPEAT CTA CHEST, EKG REVIEWED  - NOTED PATIENT IS STILL BREAST FEEDING; LAST PREGNANCY WAS 2 YEARS AGO  - COVID AB NEGATIVE RECENTLY  - MAGUI - TRACE MR  - DENIES FAMILY HX OF AUTOIMMUNE DISEASE, DENIES HX OF MISCARRIAGES, DENIES HX OF COVID, DENIES USE OF OCP ; HAS BEEN NWB AND SEDENTARY SINCE RECENT ANKLE SPRAIN  # UTI - PLACED ON ROCEPHIN, UCX - NGTD  # RECENT INJURY OF RIGHT ANKLE :  - MR ANKLE DEMONSTRATES - BONE CONTUSION, SUBCHONDRAL TRABECULAR FRACTURE, SMALL ANKLE JOINT EFFUSION, TEAR OF THE ANTERIOR TALOFIBULAR LIGAMENT, COMPLETE TEAR OF THE CALCANEOFIBULAR LIGAMENT, SPRAIN, HIGH GRADE TEAR OF THE DEEP TIBIOTALAR LIGAMENT AND PARTIAL THICKNESS TEAR AT THE TALOCALCANEAL LIGAMENT, HIGH-GRADE PARTIAL THICKNESS TEAR AT THE DEEP DELTOID LIGAMENT AND MODERATE PARTIAL THICKNESS TEAR AT THE SUPERFICIAL DELTOID LIGAMENT  - PODIATRY IS FOLLOWING AS OUTPATIENT - PLAN FOR SURGERY ONCE PATIENT CAN BE OFF OF LOVENOX  # VITAMIN D DEFICIENCY - ON SUPPLEMENT  # HX OF ANEMIA  # HX OF HSV W/  GINGIVOSTOMATITIS,  GEOGRAPHIC TONGUE  - MAGIC MOUTHWASH, RECOMMEND OUTPATIENT F/U WITH DENTIST  # 24 HOUR D/C NOTICE PROVIDED;  CASE DISCUSSED AT LENGTH WITH PATIENT AND MOTHER [OVER PHONE AT BEDSIDE] - OUTPATIENT F/U RECOMMENDED.   # GI PPX ; ON LOVENOX    CARLOS MERCHANT MD COVERING BLAS MERCHANT MD

## 2021-03-20 NOTE — PHYSICAL THERAPY INITIAL EVALUATION ADULT - GENERAL OBSERVATIONS, REHAB EVAL
Pt seen supine in bed w/telemetry, (+) ace bandage of right foot s/p right ankle injury (w/ extensive ligamentous injury and bone contusion-see MRI)on January 2021

## 2021-03-20 NOTE — PHYSICAL THERAPY INITIAL EVALUATION ADULT - AMBULATION SKILLS, REHAB EVAL
mod (I) with axillary crutches, able to negotiate Unable to safely assess patient on stairs at this time.Pt does not exhibit appropriate pre-requisite skills to safely negotiate unlevel surfaces due to(poor endurance,decreased muscle strength,decreased balance,decreased safety awareness)which will put patient at significant risks for falls and injury with HR AND crutch/independent/needs device

## 2021-03-21 ENCOUNTER — TRANSCRIPTION ENCOUNTER (OUTPATIENT)
Age: 26
End: 2021-03-21

## 2021-03-21 VITALS
OXYGEN SATURATION: 98 % | RESPIRATION RATE: 16 BRPM | DIASTOLIC BLOOD PRESSURE: 61 MMHG | HEART RATE: 91 BPM | TEMPERATURE: 99 F | SYSTOLIC BLOOD PRESSURE: 97 MMHG

## 2021-03-21 LAB
ALBUMIN SERPL ELPH-MCNC: 3.3 G/DL — LOW (ref 3.5–5)
ALP SERPL-CCNC: 47 U/L — SIGNIFICANT CHANGE UP (ref 40–120)
ALT FLD-CCNC: 28 U/L DA — SIGNIFICANT CHANGE UP (ref 10–60)
ANION GAP SERPL CALC-SCNC: 9 MMOL/L — SIGNIFICANT CHANGE UP (ref 5–17)
AST SERPL-CCNC: 10 U/L — SIGNIFICANT CHANGE UP (ref 10–40)
BASOPHILS # BLD AUTO: 0.04 K/UL — SIGNIFICANT CHANGE UP (ref 0–0.2)
BASOPHILS NFR BLD AUTO: 0.6 % — SIGNIFICANT CHANGE UP (ref 0–2)
BILIRUB SERPL-MCNC: 0.7 MG/DL — SIGNIFICANT CHANGE UP (ref 0.2–1.2)
BUN SERPL-MCNC: 6 MG/DL — LOW (ref 7–18)
CALCIUM SERPL-MCNC: 9.2 MG/DL — SIGNIFICANT CHANGE UP (ref 8.4–10.5)
CHLORIDE SERPL-SCNC: 106 MMOL/L — SIGNIFICANT CHANGE UP (ref 96–108)
CO2 SERPL-SCNC: 25 MMOL/L — SIGNIFICANT CHANGE UP (ref 22–31)
CREAT SERPL-MCNC: 0.59 MG/DL — SIGNIFICANT CHANGE UP (ref 0.5–1.3)
EOSINOPHIL # BLD AUTO: 0.12 K/UL — SIGNIFICANT CHANGE UP (ref 0–0.5)
EOSINOPHIL NFR BLD AUTO: 1.9 % — SIGNIFICANT CHANGE UP (ref 0–6)
GLUCOSE SERPL-MCNC: 73 MG/DL — SIGNIFICANT CHANGE UP (ref 70–99)
HCT VFR BLD CALC: 37.2 % — SIGNIFICANT CHANGE UP (ref 34.5–45)
HGB BLD-MCNC: 11.6 G/DL — SIGNIFICANT CHANGE UP (ref 11.5–15.5)
IMM GRANULOCYTES NFR BLD AUTO: 0.3 % — SIGNIFICANT CHANGE UP (ref 0–1.5)
LYMPHOCYTES # BLD AUTO: 1.9 K/UL — SIGNIFICANT CHANGE UP (ref 1–3.3)
LYMPHOCYTES # BLD AUTO: 30 % — SIGNIFICANT CHANGE UP (ref 13–44)
MCHC RBC-ENTMCNC: 27 PG — SIGNIFICANT CHANGE UP (ref 27–34)
MCHC RBC-ENTMCNC: 31.2 GM/DL — LOW (ref 32–36)
MCV RBC AUTO: 86.7 FL — SIGNIFICANT CHANGE UP (ref 80–100)
MONOCYTES # BLD AUTO: 0.54 K/UL — SIGNIFICANT CHANGE UP (ref 0–0.9)
MONOCYTES NFR BLD AUTO: 8.5 % — SIGNIFICANT CHANGE UP (ref 2–14)
NEUTROPHILS # BLD AUTO: 3.72 K/UL — SIGNIFICANT CHANGE UP (ref 1.8–7.4)
NEUTROPHILS NFR BLD AUTO: 58.7 % — SIGNIFICANT CHANGE UP (ref 43–77)
NRBC # BLD: 0 /100 WBCS — SIGNIFICANT CHANGE UP (ref 0–0)
PLATELET # BLD AUTO: 238 K/UL — SIGNIFICANT CHANGE UP (ref 150–400)
POTASSIUM SERPL-MCNC: 3.9 MMOL/L — SIGNIFICANT CHANGE UP (ref 3.5–5.3)
POTASSIUM SERPL-SCNC: 3.9 MMOL/L — SIGNIFICANT CHANGE UP (ref 3.5–5.3)
PROT SERPL-MCNC: 7 G/DL — SIGNIFICANT CHANGE UP (ref 6–8.3)
RBC # BLD: 4.29 M/UL — SIGNIFICANT CHANGE UP (ref 3.8–5.2)
RBC # FLD: 14.9 % — HIGH (ref 10.3–14.5)
SODIUM SERPL-SCNC: 140 MMOL/L — SIGNIFICANT CHANGE UP (ref 135–145)
WBC # BLD: 6.34 K/UL — SIGNIFICANT CHANGE UP (ref 3.8–10.5)
WBC # FLD AUTO: 6.34 K/UL — SIGNIFICANT CHANGE UP (ref 3.8–10.5)

## 2021-03-21 PROCEDURE — 97162 PT EVAL MOD COMPLEX 30 MIN: CPT

## 2021-03-21 PROCEDURE — 85610 PROTHROMBIN TIME: CPT

## 2021-03-21 PROCEDURE — 83036 HEMOGLOBIN GLYCOSYLATED A1C: CPT

## 2021-03-21 PROCEDURE — 84100 ASSAY OF PHOSPHORUS: CPT

## 2021-03-21 PROCEDURE — 81025 URINE PREGNANCY TEST: CPT

## 2021-03-21 PROCEDURE — 80061 LIPID PANEL: CPT

## 2021-03-21 PROCEDURE — 85730 THROMBOPLASTIN TIME PARTIAL: CPT

## 2021-03-21 PROCEDURE — 84484 ASSAY OF TROPONIN QUANT: CPT

## 2021-03-21 PROCEDURE — 80048 BASIC METABOLIC PNL TOTAL CA: CPT

## 2021-03-21 PROCEDURE — 87086 URINE CULTURE/COLONY COUNT: CPT

## 2021-03-21 PROCEDURE — 93005 ELECTROCARDIOGRAM TRACING: CPT

## 2021-03-21 PROCEDURE — 83735 ASSAY OF MAGNESIUM: CPT

## 2021-03-21 PROCEDURE — 85027 COMPLETE CBC AUTOMATED: CPT

## 2021-03-21 PROCEDURE — 85025 COMPLETE CBC W/AUTO DIFF WBC: CPT

## 2021-03-21 PROCEDURE — 80053 COMPREHEN METABOLIC PANEL: CPT

## 2021-03-21 PROCEDURE — 87635 SARS-COV-2 COVID-19 AMP PRB: CPT

## 2021-03-21 PROCEDURE — 99285 EMERGENCY DEPT VISIT HI MDM: CPT | Mod: 25

## 2021-03-21 PROCEDURE — 71046 X-RAY EXAM CHEST 2 VIEWS: CPT

## 2021-03-21 PROCEDURE — 87040 BLOOD CULTURE FOR BACTERIA: CPT

## 2021-03-21 PROCEDURE — 86769 SARS-COV-2 COVID-19 ANTIBODY: CPT

## 2021-03-21 PROCEDURE — 36415 COLL VENOUS BLD VENIPUNCTURE: CPT

## 2021-03-21 PROCEDURE — 81001 URINALYSIS AUTO W/SCOPE: CPT

## 2021-03-21 PROCEDURE — 71275 CT ANGIOGRAPHY CHEST: CPT

## 2021-03-21 RX ORDER — METOCLOPRAMIDE HCL 10 MG
5 TABLET ORAL ONCE
Refills: 0 | Status: DISCONTINUED | OUTPATIENT
Start: 2021-03-21 | End: 2021-03-21

## 2021-03-21 RX ADMIN — ENOXAPARIN SODIUM 100 MILLIGRAM(S): 100 INJECTION SUBCUTANEOUS at 13:19

## 2021-03-21 RX ADMIN — Medication 30 MILLILITER(S): at 01:02

## 2021-03-21 NOTE — CHART NOTE - NSCHARTNOTEFT_GEN_A_CORE
EVENT:   3/21/21, 12:37am, patient c/o heartburn  and requested medication to be given (Cr - 0.56).   HPI:    24 y/o female with PMH of anemia, herpes, presented with chest pain since morning. Pt reported sudden onset, 5/10, sharp left sided chest pain since 5 AM. Pt was recently admitted to [facility] for Pulmonary embolism and was discharged on Lovenox.     OBJECTIVE:  Vital Signs Last 24 Hrs  T(C): 36.9 (21 Mar 2021 01:30), Max: 37.5 (20 Mar 2021 21:02)  T(F): 98.4 (21 Mar 2021 01:30), Max: 99.5 (20 Mar 2021 21:02)  HR: 83 (21 Mar 2021 01:30) (70 - 104)  BP: 99/63 (21 Mar 2021 01:30) (96/60 - 121/74)  BP(mean): --  RR: 18 (21 Mar 2021 01:30) (16 - 18)  SpO2: 98% (21 Mar 2021 01:30) (96% - 99%)    FOCUSED PHYSICAL EXAM:    LABS:                        10.1   4.46  )-----------( 227      ( 19 Mar 2021 08:48 )             32.5     03-19    142  |  112<H>  |  8   ----------------------------<  77  3.7   |  25  |  0.56    Ca    8.4      19 Mar 2021 08:48    PLAN:   - Maalox 30 ml po x 1 dose for c/o heartburn.    FOLLOW UP / RESULT:   - Reassess patient comfort level,   - Continue supportive care and safety measures.

## 2021-03-21 NOTE — PROGRESS NOTE ADULT - ATTENDING COMMENTS
CARDIOLOGY ATTENDING    Agree with above. No further inpatient cardiac workup needed. Recommend outpatient event monitoring.

## 2021-03-21 NOTE — PROGRESS NOTE ADULT - PROVIDER SPECIALTY LIST ADULT
Cardiology
Internal Medicine
Cardiology
Internal Medicine

## 2021-03-21 NOTE — DISCHARGE NOTE NURSING/CASE MANAGEMENT/SOCIAL WORK - PATIENT PORTAL LINK FT
You can access the FollowMyHealth Patient Portal offered by [facility] by registering at the following website: [email or web address]. By joining [employer]’s FollowMyHealth portal, you will also be able to view your health information using other applications (apps) compatible with our system.

## 2021-03-21 NOTE — PROGRESS NOTE ADULT - SUBJECTIVE AND OBJECTIVE BOX
pt seen and examined, mild heartburn overnight  no chest  pain or sob           acetaminophen   Tablet .. 650 milliGRAM(s) Oral every 6 hours PRN  enoxaparin Injectable 100 milliGRAM(s) SubCutaneous daily  ketorolac   Injectable 15 milliGRAM(s) IV Push every 8 hours PRN  sodium chloride 0.9%. 1000 milliLiter(s) IV Continuous <Continuous>                            10.1   4.46  )-----------( 227      ( 19 Mar 2021 08:48 )             32.5       Hemoglobin: 10.1 g/dL (03-19 @ 08:48)  Hemoglobin: 10.8 g/dL (03-18 @ 07:48)  Hemoglobin: 11.7 g/dL (03-17 @ 09:25)      03-19    142  |  112<H>  |  8   ----------------------------<  77  3.7   |  25  |  0.56    Ca    8.4      19 Mar 2021 08:48      Creatinine Trend: 0.56<--, 0.62<--, 0.59<--, 0.63<--, 0.63<--, 0.43<--    COAGS:           T(C): 36.9 (03-21-21 @ 05:23), Max: 37.5 (03-20-21 @ 21:02)  HR: 69 (03-21-21 @ 05:23) (69 - 104)  BP: 106/68 (03-21-21 @ 05:23) (96/60 - 121/74)  RR: 18 (03-21-21 @ 05:23) (16 - 18)  SpO2: 98% (03-21-21 @ 05:23) (96% - 99%)  Wt(kg): --    I&O's Summary    20 Mar 2021 07:01  -  21 Mar 2021 06:27  --------------------------------------------------------  IN: 240 mL / OUT: 400 mL / NET: -160 mL    Alert / oriented   no jvd   clear james   reg s1s2  soft + bs , non tender  + pulses     CONTRAST/COMPLICATIONS:  IV Contrast: Omnipaque 350 / 50 cc administered / 50 cc discarded.  Oral Contrast: NONE  Complications: None reported at time of study completion    PROCEDURE:  CT Angiography of the Chest.  Sagittal and coronal reformats were performed as well as 3D (MIP) reconstructions. Study is limited by suboptimal bolus timing.    FINDINGS:    LUNGS AND AIRWAYS: Patent central airways.  Lungs are clear.  PLEURA: No pleural effusion.  MEDIASTINUM AND ARVIND: No lymphadenopathy.  VESSELS: Within normal limits.  HEART: Heart size is normal. No pericardial effusion.  CHEST WALL AND LOWER NECK: Within normal limits.  VISUALIZED UPPER ABDOMEN: Within normal limits.  BONES: Within normal limits.    IMPRESSION:  Limited by bolus timing. No central pulmonary embolism seen. Cannot evaluate previously seen right segmental and subsegmental branches.    Acute pathology.      ANEUDY HERNÁNDEZ MD; Attending Radiologist  This document has been electronically signed. Mar 17 2021  3:37PM    ASSESSMENT/PLAN: 	25y Female PMH of Anemia, recent PE after being immobile due to an ankle injury presented with chest pain, palpitations since morning of admission. recent echo with no structural heart disease.    A/C with lovenox at present for PE   Tele stable , HR stable.   ABX per medicine    PODIATRY  follow up  OUTPATIENT - PLAN FOR SURGERY ONCE PATIENT CAN BE OFF OF LOVENOX   GI / DVT prophylaxis.   keep K>4, mag >2.0   Physical therapy follow up   outpatient follow up   D/W Dr Fam

## 2021-03-23 LAB
CULTURE RESULTS: SIGNIFICANT CHANGE UP
CULTURE RESULTS: SIGNIFICANT CHANGE UP
SPECIMEN SOURCE: SIGNIFICANT CHANGE UP
SPECIMEN SOURCE: SIGNIFICANT CHANGE UP

## 2021-03-27 PROBLEM — I26.99 OTHER PULMONARY EMBOLISM WITHOUT ACUTE COR PULMONALE: Chronic | Status: ACTIVE | Noted: 2021-03-17

## 2021-03-30 DIAGNOSIS — R09.89 OTHER SPECIFIED SYMPTOMS AND SIGNS INVOLVING THE CIRCULATORY AND RESPIRATORY SYSTEMS: ICD-10-CM

## 2021-03-30 DIAGNOSIS — R42 DIZZINESS AND GIDDINESS: ICD-10-CM

## 2021-03-30 DIAGNOSIS — Z87.81 PERSONAL HISTORY OF (HEALED) TRAUMATIC FRACTURE: ICD-10-CM

## 2021-03-30 DIAGNOSIS — Z78.9 OTHER SPECIFIED HEALTH STATUS: ICD-10-CM

## 2021-03-30 DIAGNOSIS — R00.0 TACHYCARDIA, UNSPECIFIED: ICD-10-CM

## 2021-03-30 DIAGNOSIS — Z86.39 PERSONAL HISTORY OF OTHER ENDOCRINE, NUTRITIONAL AND METABOLIC DISEASE: ICD-10-CM

## 2021-03-30 DIAGNOSIS — Z86.19 PERSONAL HISTORY OF OTHER INFECTIOUS AND PARASITIC DISEASES: ICD-10-CM

## 2021-03-30 DIAGNOSIS — D64.9 ANEMIA, UNSPECIFIED: ICD-10-CM

## 2021-03-30 DIAGNOSIS — Z87.440 PERSONAL HISTORY OF URINARY (TRACT) INFECTIONS: ICD-10-CM

## 2021-03-30 PROBLEM — Z00.00 ENCOUNTER FOR PREVENTIVE HEALTH EXAMINATION: Status: ACTIVE | Noted: 2021-03-30

## 2021-03-30 RX ORDER — ENOXAPARIN SODIUM 100 MG/ML
100 INJECTION SUBCUTANEOUS DAILY
Refills: 0 | Status: ACTIVE | COMMUNITY

## 2021-04-06 ENCOUNTER — EMERGENCY (EMERGENCY)
Facility: HOSPITAL | Age: 26
LOS: 1 days | Discharge: HOME | End: 2021-04-06
Attending: STUDENT IN AN ORGANIZED HEALTH CARE EDUCATION/TRAINING PROGRAM
Payer: MEDICAID

## 2021-04-06 ENCOUNTER — EMERGENCY (EMERGENCY)
Facility: HOSPITAL | Age: 26
LOS: 1 days | Discharge: HOME | End: 2021-04-06
Attending: EMERGENCY MEDICINE
Payer: MEDICAID

## 2021-04-06 VITALS
OXYGEN SATURATION: 96 % | SYSTOLIC BLOOD PRESSURE: 109 MMHG | RESPIRATION RATE: 16 BRPM | HEART RATE: 94 BPM | TEMPERATURE: 98 F | WEIGHT: 151.02 LBS | HEIGHT: 62 IN | DIASTOLIC BLOOD PRESSURE: 72 MMHG

## 2021-04-06 VITALS
DIASTOLIC BLOOD PRESSURE: 74 MMHG | HEART RATE: 81 BPM | OXYGEN SATURATION: 97 % | HEIGHT: 62 IN | SYSTOLIC BLOOD PRESSURE: 110 MMHG | RESPIRATION RATE: 18 BRPM | TEMPERATURE: 98 F

## 2021-04-06 VITALS
RESPIRATION RATE: 17 BRPM | SYSTOLIC BLOOD PRESSURE: 111 MMHG | DIASTOLIC BLOOD PRESSURE: 64 MMHG | TEMPERATURE: 98 F | OXYGEN SATURATION: 98 % | HEART RATE: 90 BPM

## 2021-04-06 LAB
ALBUMIN SERPL ELPH-MCNC: 3.9 G/DL — SIGNIFICANT CHANGE UP (ref 3.5–5)
ALP SERPL-CCNC: 54 U/L — SIGNIFICANT CHANGE UP (ref 40–120)
ALT FLD-CCNC: 32 U/L DA — SIGNIFICANT CHANGE UP (ref 10–60)
ANION GAP SERPL CALC-SCNC: 7 MMOL/L — SIGNIFICANT CHANGE UP (ref 5–17)
AST SERPL-CCNC: 13 U/L — SIGNIFICANT CHANGE UP (ref 10–40)
BASOPHILS # BLD AUTO: 0.03 K/UL — SIGNIFICANT CHANGE UP (ref 0–0.2)
BASOPHILS NFR BLD AUTO: 0.6 % — SIGNIFICANT CHANGE UP (ref 0–2)
BILIRUB SERPL-MCNC: 0.5 MG/DL — SIGNIFICANT CHANGE UP (ref 0.2–1.2)
BUN SERPL-MCNC: 11 MG/DL — SIGNIFICANT CHANGE UP (ref 7–18)
CALCIUM SERPL-MCNC: 9 MG/DL — SIGNIFICANT CHANGE UP (ref 8.4–10.5)
CHLORIDE SERPL-SCNC: 106 MMOL/L — SIGNIFICANT CHANGE UP (ref 96–108)
CO2 SERPL-SCNC: 27 MMOL/L — SIGNIFICANT CHANGE UP (ref 22–31)
CREAT SERPL-MCNC: 0.69 MG/DL — SIGNIFICANT CHANGE UP (ref 0.5–1.3)
EOSINOPHIL # BLD AUTO: 0.05 K/UL — SIGNIFICANT CHANGE UP (ref 0–0.5)
EOSINOPHIL NFR BLD AUTO: 1 % — SIGNIFICANT CHANGE UP (ref 0–6)
GLUCOSE SERPL-MCNC: 96 MG/DL — SIGNIFICANT CHANGE UP (ref 70–99)
HCG SERPL-ACNC: <1 MIU/ML — SIGNIFICANT CHANGE UP
HCG UR QL: NEGATIVE — SIGNIFICANT CHANGE UP
HCT VFR BLD CALC: 34.4 % — LOW (ref 34.5–45)
HGB BLD-MCNC: 11 G/DL — LOW (ref 11.5–15.5)
IMM GRANULOCYTES NFR BLD AUTO: 0.4 % — SIGNIFICANT CHANGE UP (ref 0–1.5)
LYMPHOCYTES # BLD AUTO: 1.57 K/UL — SIGNIFICANT CHANGE UP (ref 1–3.3)
LYMPHOCYTES # BLD AUTO: 31.2 % — SIGNIFICANT CHANGE UP (ref 13–44)
MCHC RBC-ENTMCNC: 27.8 PG — SIGNIFICANT CHANGE UP (ref 27–34)
MCHC RBC-ENTMCNC: 32 GM/DL — SIGNIFICANT CHANGE UP (ref 32–36)
MCV RBC AUTO: 86.9 FL — SIGNIFICANT CHANGE UP (ref 80–100)
MONOCYTES # BLD AUTO: 0.45 K/UL — SIGNIFICANT CHANGE UP (ref 0–0.9)
MONOCYTES NFR BLD AUTO: 8.9 % — SIGNIFICANT CHANGE UP (ref 2–14)
NEUTROPHILS # BLD AUTO: 2.91 K/UL — SIGNIFICANT CHANGE UP (ref 1.8–7.4)
NEUTROPHILS NFR BLD AUTO: 57.9 % — SIGNIFICANT CHANGE UP (ref 43–77)
NRBC # BLD: 0 /100 WBCS — SIGNIFICANT CHANGE UP (ref 0–0)
PLATELET # BLD AUTO: 297 K/UL — SIGNIFICANT CHANGE UP (ref 150–400)
POTASSIUM SERPL-MCNC: 3.7 MMOL/L — SIGNIFICANT CHANGE UP (ref 3.5–5.3)
POTASSIUM SERPL-SCNC: 3.7 MMOL/L — SIGNIFICANT CHANGE UP (ref 3.5–5.3)
PROT SERPL-MCNC: 7.4 G/DL — SIGNIFICANT CHANGE UP (ref 6–8.3)
RBC # BLD: 3.96 M/UL — SIGNIFICANT CHANGE UP (ref 3.8–5.2)
RBC # FLD: 14.2 % — SIGNIFICANT CHANGE UP (ref 10.3–14.5)
SODIUM SERPL-SCNC: 140 MMOL/L — SIGNIFICANT CHANGE UP (ref 135–145)
TROPONIN I SERPL-MCNC: <0.015 NG/ML — SIGNIFICANT CHANGE UP (ref 0–0.04)
WBC # BLD: 5.03 K/UL — SIGNIFICANT CHANGE UP (ref 3.8–10.5)
WBC # FLD AUTO: 5.03 K/UL — SIGNIFICANT CHANGE UP (ref 3.8–10.5)

## 2021-04-06 PROCEDURE — 84484 ASSAY OF TROPONIN QUANT: CPT

## 2021-04-06 PROCEDURE — 85025 COMPLETE CBC W/AUTO DIFF WBC: CPT

## 2021-04-06 PROCEDURE — 80053 COMPREHEN METABOLIC PANEL: CPT

## 2021-04-06 PROCEDURE — 84702 CHORIONIC GONADOTROPIN TEST: CPT

## 2021-04-06 PROCEDURE — 99282 EMERGENCY DEPT VISIT SF MDM: CPT

## 2021-04-06 PROCEDURE — 99284 EMERGENCY DEPT VISIT MOD MDM: CPT | Mod: 25

## 2021-04-06 PROCEDURE — 71275 CT ANGIOGRAPHY CHEST: CPT | Mod: 26,MA

## 2021-04-06 PROCEDURE — 93005 ELECTROCARDIOGRAM TRACING: CPT

## 2021-04-06 PROCEDURE — 81025 URINE PREGNANCY TEST: CPT

## 2021-04-06 PROCEDURE — 36415 COLL VENOUS BLD VENIPUNCTURE: CPT

## 2021-04-06 PROCEDURE — 99285 EMERGENCY DEPT VISIT HI MDM: CPT

## 2021-04-06 PROCEDURE — 71275 CT ANGIOGRAPHY CHEST: CPT

## 2021-04-06 NOTE — ED ADULT NURSE NOTE - COVID-19 ORDERING FACILITY
Pt should rest the shoulder as much as she can. Take a break from gym and house chores x 1-2 weeks.  May use Aspercreme patch for pain relief  May take Advil as needed for pain, follow label instruction  Slow stretch exercise daily and stop if painful  F/u with primary doctor if pain persisted or worsen  ER precaution reviewed.       Patient Education     Shoulder Exercises    To start, sit in a chair with your feet flat on the floor. Your weight should be slightly forward so that you’re balanced evenly on your buttocks. Relax your shoulders and keep your head level. Avoid arching your back or rounding your shoulders. Using a chair with arms may help you keep your balance.  · Raise your arms, elbows bent, to shoulder height.  · Slowly move your forearms together. Hold for 5 seconds.  · Return to starting position. Repeat 5 times.  Date Last Reviewed: 3/1/2018  © 9225-3921 [employer]. [address] [place] [address], [place] [address]. All rights reserved. This information is not intended as a substitute for professional medical care. Always follow your healthcare professional's instructions.            [facility]

## 2021-04-06 NOTE — ED ADULT TRIAGE NOTE - CHIEF COMPLAINT QUOTE
c/o " I believe I have a blood clot in my throat sine 2 pm and I feel like tightness sometimes" as per patient

## 2021-04-06 NOTE — ED PROVIDER NOTE - OBJECTIVE STATEMENT
Problem: Patient Care Overview  Goal: Individualization & Mutuality  See epic    Comments: See epic   26F, no significant pmh, presenting with left neck pain. patient was seen in the ED last night for similar symptoms and had negative workup. patient remained in the waiting room overnight reportedly waiting for a ride. when asked about transportation home by nursing manager, patient decided to get triaged for left sided neck pain. pain worse with movement. no headache, changes in vision, shortness of breath, nausea or vomiting. patient reports piece of a covid swab was stuck on her nostril.

## 2021-04-06 NOTE — ED PROVIDER NOTE - CLINICAL SUMMARY MEDICAL DECISION MAKING FREE TEXT BOX
27 y/o female with recent hx of PE, on Lovenox, presents with chest wall tenderness and a reported episode of hypoxia and bradycardia at home. Will obtain EKG, labs, and CTA chest to r/o new PE. 27 y/o female with recent hx of PE, on Lovenox, presents with chest wall tenderness and a reported episode of hypoxia and bradycardia at home. Will obtain EKG, labs, and CTA chest to r/o new PE.    ekg, and labs unremarkable, CTA chest shows No acute or chronic pulmonary thromboembolism. Complete resolution of previously seen right middle and right lower lobe segmental/subsegmental pulmonary emboli since 3/5/2021.  Discussed above with patient. On reeval patient well-appearing, no evidence of respiratory distress/hypoxia. Patient stable for discharge to followup with PMD.

## 2021-04-06 NOTE — ED ADULT NURSE NOTE - OBJECTIVE STATEMENT
c/o " I believe I have a blood clot in my throat sine 2 pm and I feel like tightness sometimes" seen by

## 2021-04-06 NOTE — ED PROVIDER NOTE - NSFOLLOWUPINSTRUCTIONS_ED_ALL_ED_FT
Continue lovenox as previously instructed.  Followup with PMD for reevaluation.  Return to ED if you develop severe chest pain, or difficulty breathing.      Chest Wall Pain    WHAT YOU NEED TO KNOW:    Chest wall pain may be caused by problems with the muscles, cartilage, or bones of the chest wall. Chest wall pain may also be caused by pain that spreads to your chest from another part of your body. The pain may be aching, severe, dull, or sharp. It may come and go, or it may be constant. The pain may be worse when you move in certain ways, breathe deeply, or cough.     DISCHARGE INSTRUCTIONS:    Call 911 if:   •You have any of the following signs of a heart attack: ?Squeezing, pressure, or pain in your chest      ?You may also have any of the following: ?Discomfort or pain in your back, neck, jaw, stomach, or arm      ?Shortness of breath      ?Nausea or vomiting      ?Lightheadedness or a sudden cold sweat            Return to the emergency department if:   •You have severe pain.          Contact your healthcare provider if:   •You develop a rash.       •You have other new symptoms.      •Your pain does not improve, even with treatment.      •You have questions or concerns about your condition or care.       Medicines: You may need any of the following:   •NSAIDs, such as ibuprofen, help decrease swelling, pain, and fever. This medicine is available with or without a doctor's order. NSAIDs can cause stomach bleeding or kidney problems in certain people. If you take blood thinner medicine, always ask your healthcare provider if NSAIDs are safe for you. Always read the medicine label and follow directions.      •Acetaminophen decreases pain. It is available without a doctor's order. Ask how much to take and how often to take it. Follow directions. Acetaminophen can cause liver damage if not taken correctly.      •A cream may be applied to your chest to decrease pain.       •Take your medicine as directed. Contact your healthcare provider if you think your medicine is not helping or if you have side effects. Tell him of her if you are allergic to any medicine. Keep a list of the medicines, vitamins, and herbs you take. Include the amounts, and when and why you take them. Bring the list or the pill bottles to follow-up visits. Carry your medicine list with you in case of an emergency.      Follow up with your healthcare provider as directed: Write down your questions so you remember to ask them during your visits.     Self-care:   •Rest as needed. Avoid activities that make your chest wall pain worse.      •Apply heat on your chest for 20 to 30 minutes every 2 hours for as many days as directed. Heat helps decrease pain and muscle spasms.      •Apply ice on your chest for 15 to 20 minutes every hour or as directed. Use an ice pack, or put crushed ice in a plastic bag. Cover it with a towel. Ice helps prevent tissue damage and decreases swelling and pain.

## 2021-04-06 NOTE — ED PROVIDER NOTE - IV ALTEPLASE INCLUSION HIDDEN
The patient's prescription has been approved and sent to   [facility]/pharmacy #[id number] - [place], [place] - [address]  [address] [address]  Phone: [phone] Fax: [phone]     show

## 2021-04-06 NOTE — ED PROVIDER NOTE - PATIENT PORTAL LINK FT
You can access the FollowMyHealth Patient Portal offered by [facility] by registering at the following website: [email or web address]. By joining [employer]’s FollowMyHealth portal, you will also be able to view your health information using other applications (apps) compatible with our system.

## 2021-04-06 NOTE — ED PROVIDER NOTE - PHYSICAL EXAMINATION
General: well appearing female, no acute distress   HEENT: normocephalic, atraumatic, no masses or lymphadenopathy   Respiratory: normal work of breathing, lungs clear to auscultation bilaterally  Cardiac: regular rate and rhythm   Abdomen: soft, non-tender, no guarding or rebound   MSK: no swelling or tenderness of lower extremities, moving all extremities spontaneously   Skin: warm, dry   Neuro: A&Ox3  Psych: appropriate affect

## 2021-04-06 NOTE — ED PROVIDER NOTE - PATIENT PORTAL LINK FT
You can access the FollowMyHealth Patient Portal offered by [facility] by registering at the following website: [email or web address]. By joining [employer]’s FollowMyHealth portal, you will also be able to view your health information using other applications (apps) compatible with our system.

## 2021-04-06 NOTE — ED PROVIDER NOTE - OBJECTIVE STATEMENT
27 y/o female h/o PE in March 2021 after a foot injury, coming in reporting concern she might have a venous blood clot in her neck. Reports she feels a little bump over her sternum that is intermittent and is concerned it is a blood clot. Reports with that sensation she has pain that radiates show the chest wall. Reports that yesterday while she was standing doing some stretching exercises to increase blood flow to her extremities, she noticed her watch noted her HR went down to 45. At that time reports feeling faint so she sat down. Denies chest pain or shortness of breath at that time. Reports also at a different time she felt she could not take a full breath so she checked her pulse-ox which read 75, but within a minute it went up to the 90's. Reports currently she is taking Lovenox 100mg QD.

## 2021-04-06 NOTE — ED ADULT TRIAGE NOTE - CHIEF COMPLAINT QUOTE
/o pain Left neck and shoulder blade , seen here last night and was discharged , been in waiting room for "somebody " to pick her up

## 2021-04-06 NOTE — ED PROVIDER NOTE - CLINICAL SUMMARY MEDICAL DECISION MAKING FREE TEXT BOX
26F presenting with left sided neck pain. symptoms  unchanged from last night. no difficulty breathing or swallowing. no masses palpated. offered motrin but patient declined. awaiting [employer].

## 2021-04-06 NOTE — ED PROVIDER NOTE - PHYSICAL EXAMINATION
Chest wall: Pt reports mild tenderness over the sternocleidal muscle insertions into the manubrium, no palpable mass or nodules.

## 2021-04-08 ENCOUNTER — APPOINTMENT (OUTPATIENT)
Dept: CARDIOLOGY | Facility: CLINIC | Age: 26
End: 2021-04-08

## 2021-04-14 ENCOUNTER — APPOINTMENT (OUTPATIENT)
Dept: ORTHOPEDIC SURGERY | Facility: CLINIC | Age: 26
End: 2021-04-14
Payer: MEDICAID

## 2021-04-14 VITALS — BODY MASS INDEX: 28.34 KG/M2 | HEIGHT: 62 IN | WEIGHT: 154 LBS

## 2021-04-14 DIAGNOSIS — S93.04XA DISLOCATION OF RIGHT ANKLE JOINT, INITIAL ENCOUNTER: ICD-10-CM

## 2021-04-14 DIAGNOSIS — M25.671 STIFFNESS OF RIGHT ANKLE, NOT ELSEWHERE CLASSIFIED: ICD-10-CM

## 2021-04-14 DIAGNOSIS — S93.421A SPRAIN OF DELTOID LIGAMENT OF RIGHT ANKLE, INITIAL ENCOUNTER: ICD-10-CM

## 2021-04-14 DIAGNOSIS — M25.571 PAIN IN RIGHT ANKLE AND JOINTS OF RIGHT FOOT: ICD-10-CM

## 2021-04-14 DIAGNOSIS — S82.61XA DISPLACED FRACTURE OF LATERAL MALLEOLUS OF RIGHT FIBULA, INITIAL ENCOUNTER FOR CLOSED FRACTURE: ICD-10-CM

## 2021-04-14 PROCEDURE — 73610 X-RAY EXAM OF ANKLE: CPT | Mod: RT

## 2021-04-14 PROCEDURE — 99072 ADDL SUPL MATRL&STAF TM PHE: CPT

## 2021-04-14 PROCEDURE — 99204 OFFICE O/P NEW MOD 45 MIN: CPT

## 2021-04-14 NOTE — PHYSICAL EXAM
[de-identified] : Physical Examination\par General: well nourished, in no acute distress, alert and oriented to person, place and time\par Psychiatric: normal mood and affect, no abnormal movements or speech patterns\par Eyes: vision intact - glasses\par Throat: no thyromegaly\par Lymph: no enlarged nodes, no lymphedema in extremity\par Respiratory: no wheezing, no shortness of breath with ambulation\par Cardiac: no cardiac leg swelling, 2+ peripheral pulses\par Neurology: normal gross sensation in extremities to light touch\par Abdomen: soft, non-tender, tympanic, no masses\par \par Musculoskeletal Examination\par Ambulation	+ antalgic gait, + assistive devices\par \par Ankle			Right			Left\par General\par 	Deformity       	mild difuse swelling			normal	\par 	Skin/Edema   	small scar over ATFL			normal\par 	Erythema       	-			-\par 	Alignment      	neutral			neutral\par Range of Motion\par 	Ankle Dorsiflex	0			20\par 	Ankle Plantarflex	25			45\par 	Inversion        	5			15\par 	Eversion		2			5\par Drawer\par 	ATFL		- less			mild +\par 	CFL	                	-			-\par 	PTFL		-			-\par Palpation\par 	ATFL		+++			-\par 	Medial Heel   	-			-\par 	Other		+++ tip lateal mal over CFL			-\par Strength Examination/Atrophy\par 	EHL 		5+			5+\par 	FHL 		5+			5+\par 	Tibialis Anterior	4+			5+\par 	Achilles/Soleus	4+			5+\par Sensation\par 	Deep Peroneal	normal			normal\par 	Super Peroneal 	normal			normal\par 	Sural 		normal			normal\par 	Posterior Tibial 	normal			normal\par 	Saphenous    	normal			normal\par Pulses\par 	DP   		2+			2+\par \par \par  [de-identified] : MRI of the right ankle from [facility] on 3/8/21\par My impression of the images:\par ATFL torn\par CFL torn, possible avulsion fragment\par PTFL intact\par edema medial talus and medial malleolus\par deep deltoid not well seen\par superficial deltoid mostly intact fibers\par possible bone fragment medial gutter\par \par \par The Final Radiologist Impression:\par Bones: There is marked marrow edema within the posterior medial aspect of the tibial plafond which extends into the medial malleolus. There is consistent with a bone contusion. There is subchondral linear signal abnormality at the talar head which is consistent with a subchondral trabecular fracture. There is marked marrow edema within the plantar aspect of the cuboid consistent with a marked bone contusion. There is mild edema within the anterior process of the calcaneus.\par \par Joints: There is mild edema within the sinus Tarsi. Small ankle joint effusion.\par \par Ligaments: There is a tear of the anterior talofibular ligament with a small ossific fragment suggestive of an avulsion fracture. There is a complete tear of the calcaneofibular ligament proximally. There is edema at the fibular aspect of the posterior talofibular ligament consistent with a low-grade sprain. There is a high-grade tear of the deep tibiotalar ligament and partial thickness tearing at the talocalcaneal and tibiospring ligaments.\par \par Muscles and Tendons: Normal.\par \par Plantar Fascia: Normal.\par \par Neurovascular: Normal.\par \par Soft Tissues: Mild subcutaneous edema at the ankle.\par \par Impression:\par Bone contusions involving the posterior medial tibial plafond and medial malleolus, the anterior process of the calcaneus, the cuboid, and subchondral trabecular fracture at the talar head. Suspected avulsion fracture at the fibular attachment of the anterior talofibular ligament with the ligament completely torn. CT scan can be performed for further evaluation if clinically warranted.\par \par Complete tear of the calcaneofibular ligament.\par \par High-grade partial-thickness tearing at the deep deltoid ligament and moderate grade partial thickness tearing at the superficial deltoid ligament.\par \par \par \par 3 views of the affected R ankle, (AP, Oblique and Lateral)\par were ordered, obtained and evaluated by myself today and\par demonstrate:\par [Is] intact Mortise\par [Normal] Talus contour\par - osteophtes\par - Os Trigonum\par - spurring\par [No] soft tissue calcification\par + dis-use osteopenia\par avulsion fragment tip lateral mal\par small bony fragments medial gutter\par

## 2021-04-14 NOTE — DISCUSSION/SUMMARY
[de-identified] : Possible right ankle dislocation with injury of the ATFL, CFL or with avulsion injury, medial deltoid injury with avulsion fragments, bone contusion of the medial malleolus and talus with osteopenia from disuse\par \par \par I discussed my findings and history exam and radiology\par \par Recommend progression of weightbearing to full weightbearing as tolerated right lower extremity and CAM boot over the next 1-2 weeks\par \par CAM boot for 4-6 weeks\par \par Physical therapy\par \par Elevate ice compression\par \par Unable to provide nonsteroidal anti-inflammatories due to anticoagulation for unknown cause DVT\par \par Followup 6 weeks
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
## 2021-05-24 ENCOUNTER — APPOINTMENT (OUTPATIENT)
Dept: CARDIOLOGY | Facility: CLINIC | Age: 26
End: 2021-05-24
Payer: MEDICAID

## 2021-05-24 VITALS
WEIGHT: 154 LBS | HEIGHT: 62 IN | HEART RATE: 96 BPM | DIASTOLIC BLOOD PRESSURE: 54 MMHG | OXYGEN SATURATION: 98 % | BODY MASS INDEX: 28.34 KG/M2 | TEMPERATURE: 97.6 F | SYSTOLIC BLOOD PRESSURE: 100 MMHG | RESPIRATION RATE: 17 BRPM

## 2021-05-24 DIAGNOSIS — Z51.81 ENCOUNTER FOR THERAPEUTIC DRUG LVL MONITORING: ICD-10-CM

## 2021-05-24 DIAGNOSIS — Z79.01 ENCOUNTER FOR THERAPEUTIC DRUG LVL MONITORING: ICD-10-CM

## 2021-05-24 DIAGNOSIS — I26.99 OTHER PULMONARY EMBOLISM W/OUT ACUTE COR PULMONALE: ICD-10-CM

## 2021-05-24 DIAGNOSIS — R07.9 CHEST PAIN, UNSPECIFIED: ICD-10-CM

## 2021-05-24 DIAGNOSIS — Z71.89 OTHER SPECIFIED COUNSELING: ICD-10-CM

## 2021-05-24 DIAGNOSIS — R00.0 TACHYCARDIA, UNSPECIFIED: ICD-10-CM

## 2021-05-24 DIAGNOSIS — R06.02 SHORTNESS OF BREATH: ICD-10-CM

## 2021-05-24 PROCEDURE — 99205 OFFICE O/P NEW HI 60 MIN: CPT

## 2021-05-24 PROCEDURE — 93000 ELECTROCARDIOGRAM COMPLETE: CPT

## 2022-12-05 ENCOUNTER — EMERGENCY (EMERGENCY)
Facility: HOSPITAL | Age: 27
LOS: 1 days | Discharge: HOME | End: 2022-12-05
Attending: EMERGENCY MEDICINE
Payer: MEDICAID

## 2022-12-05 VITALS
OXYGEN SATURATION: 99 % | TEMPERATURE: 98 F | HEART RATE: 76 BPM | RESPIRATION RATE: 18 BRPM | DIASTOLIC BLOOD PRESSURE: 71 MMHG | SYSTOLIC BLOOD PRESSURE: 115 MMHG

## 2022-12-05 VITALS
DIASTOLIC BLOOD PRESSURE: 68 MMHG | HEART RATE: 88 BPM | RESPIRATION RATE: 18 BRPM | HEIGHT: 62 IN | WEIGHT: 156.97 LBS | OXYGEN SATURATION: 100 % | SYSTOLIC BLOOD PRESSURE: 108 MMHG | TEMPERATURE: 99 F

## 2022-12-05 LAB
ALBUMIN SERPL ELPH-MCNC: 3.9 G/DL — SIGNIFICANT CHANGE UP (ref 3.5–5)
ALP SERPL-CCNC: 54 U/L — SIGNIFICANT CHANGE UP (ref 40–120)
ALT FLD-CCNC: 22 U/L DA — SIGNIFICANT CHANGE UP (ref 10–60)
ANION GAP SERPL CALC-SCNC: 10 MMOL/L — SIGNIFICANT CHANGE UP (ref 5–17)
APPEARANCE UR: CLEAR — SIGNIFICANT CHANGE UP
AST SERPL-CCNC: 14 U/L — SIGNIFICANT CHANGE UP (ref 10–40)
BASOPHILS # BLD AUTO: 0.03 K/UL — SIGNIFICANT CHANGE UP (ref 0–0.2)
BASOPHILS NFR BLD AUTO: 0.6 % — SIGNIFICANT CHANGE UP (ref 0–2)
BILIRUB SERPL-MCNC: 0.5 MG/DL — SIGNIFICANT CHANGE UP (ref 0.2–1.2)
BILIRUB UR-MCNC: NEGATIVE — SIGNIFICANT CHANGE UP
BUN SERPL-MCNC: 8 MG/DL — SIGNIFICANT CHANGE UP (ref 7–18)
CALCIUM SERPL-MCNC: 9 MG/DL — SIGNIFICANT CHANGE UP (ref 8.4–10.5)
CHLORIDE SERPL-SCNC: 105 MMOL/L — SIGNIFICANT CHANGE UP (ref 96–108)
CO2 SERPL-SCNC: 24 MMOL/L — SIGNIFICANT CHANGE UP (ref 22–31)
COLOR SPEC: YELLOW — SIGNIFICANT CHANGE UP
CREAT SERPL-MCNC: 0.63 MG/DL — SIGNIFICANT CHANGE UP (ref 0.5–1.3)
DIFF PNL FLD: ABNORMAL
EGFR: 123 ML/MIN/1.73M2 — SIGNIFICANT CHANGE UP
EOSINOPHIL # BLD AUTO: 0.06 K/UL — SIGNIFICANT CHANGE UP (ref 0–0.5)
EOSINOPHIL NFR BLD AUTO: 1.2 % — SIGNIFICANT CHANGE UP (ref 0–6)
GLUCOSE SERPL-MCNC: 99 MG/DL — SIGNIFICANT CHANGE UP (ref 70–99)
GLUCOSE UR QL: NEGATIVE — SIGNIFICANT CHANGE UP
HCT VFR BLD CALC: 34.6 % — SIGNIFICANT CHANGE UP (ref 34.5–45)
HGB BLD-MCNC: 10.4 G/DL — LOW (ref 11.5–15.5)
IMM GRANULOCYTES NFR BLD AUTO: 0.2 % — SIGNIFICANT CHANGE UP (ref 0–0.9)
KETONES UR-MCNC: ABNORMAL
LEUKOCYTE ESTERASE UR-ACNC: ABNORMAL
LYMPHOCYTES # BLD AUTO: 1.05 K/UL — SIGNIFICANT CHANGE UP (ref 1–3.3)
LYMPHOCYTES # BLD AUTO: 20.8 % — SIGNIFICANT CHANGE UP (ref 13–44)
MCHC RBC-ENTMCNC: 23.6 PG — LOW (ref 27–34)
MCHC RBC-ENTMCNC: 30.1 GM/DL — LOW (ref 32–36)
MCV RBC AUTO: 78.6 FL — LOW (ref 80–100)
MONOCYTES # BLD AUTO: 0.67 K/UL — SIGNIFICANT CHANGE UP (ref 0–0.9)
MONOCYTES NFR BLD AUTO: 13.2 % — SIGNIFICANT CHANGE UP (ref 2–14)
NEUTROPHILS # BLD AUTO: 3.24 K/UL — SIGNIFICANT CHANGE UP (ref 1.8–7.4)
NEUTROPHILS NFR BLD AUTO: 64 % — SIGNIFICANT CHANGE UP (ref 43–77)
NITRITE UR-MCNC: NEGATIVE — SIGNIFICANT CHANGE UP
NRBC # BLD: 0 /100 WBCS — SIGNIFICANT CHANGE UP (ref 0–0)
NT-PROBNP SERPL-SCNC: 16 PG/ML — SIGNIFICANT CHANGE UP (ref 0–125)
PH UR: 5 — SIGNIFICANT CHANGE UP (ref 5–8)
PLATELET # BLD AUTO: 260 K/UL — SIGNIFICANT CHANGE UP (ref 150–400)
POTASSIUM SERPL-MCNC: 3.8 MMOL/L — SIGNIFICANT CHANGE UP (ref 3.5–5.3)
POTASSIUM SERPL-SCNC: 3.8 MMOL/L — SIGNIFICANT CHANGE UP (ref 3.5–5.3)
PROT SERPL-MCNC: 8.2 G/DL — SIGNIFICANT CHANGE UP (ref 6–8.3)
PROT UR-MCNC: 15
RBC # BLD: 4.4 M/UL — SIGNIFICANT CHANGE UP (ref 3.8–5.2)
RBC # FLD: 15.4 % — HIGH (ref 10.3–14.5)
SODIUM SERPL-SCNC: 139 MMOL/L — SIGNIFICANT CHANGE UP (ref 135–145)
SP GR SPEC: 1.02 — SIGNIFICANT CHANGE UP (ref 1.01–1.02)
TROPONIN I, HIGH SENSITIVITY RESULT: 3.8 NG/L — SIGNIFICANT CHANGE UP
UROBILINOGEN FLD QL: NEGATIVE — SIGNIFICANT CHANGE UP
WBC # BLD: 5.06 K/UL — SIGNIFICANT CHANGE UP (ref 3.8–10.5)
WBC # FLD AUTO: 5.06 K/UL — SIGNIFICANT CHANGE UP (ref 3.8–10.5)

## 2022-12-05 PROCEDURE — 71046 X-RAY EXAM CHEST 2 VIEWS: CPT

## 2022-12-05 PROCEDURE — 99285 EMERGENCY DEPT VISIT HI MDM: CPT

## 2022-12-05 PROCEDURE — 84484 ASSAY OF TROPONIN QUANT: CPT

## 2022-12-05 PROCEDURE — 99285 EMERGENCY DEPT VISIT HI MDM: CPT | Mod: 25

## 2022-12-05 PROCEDURE — 85025 COMPLETE CBC W/AUTO DIFF WBC: CPT

## 2022-12-05 PROCEDURE — 87086 URINE CULTURE/COLONY COUNT: CPT

## 2022-12-05 PROCEDURE — 71046 X-RAY EXAM CHEST 2 VIEWS: CPT | Mod: 26

## 2022-12-05 PROCEDURE — 84702 CHORIONIC GONADOTROPIN TEST: CPT

## 2022-12-05 PROCEDURE — 93005 ELECTROCARDIOGRAM TRACING: CPT

## 2022-12-05 PROCEDURE — 36415 COLL VENOUS BLD VENIPUNCTURE: CPT

## 2022-12-05 PROCEDURE — 80053 COMPREHEN METABOLIC PANEL: CPT

## 2022-12-05 PROCEDURE — 81001 URINALYSIS AUTO W/SCOPE: CPT

## 2022-12-05 PROCEDURE — 93010 ELECTROCARDIOGRAM REPORT: CPT

## 2022-12-05 PROCEDURE — 83880 ASSAY OF NATRIURETIC PEPTIDE: CPT

## 2022-12-05 RX ORDER — SODIUM CHLORIDE 9 MG/ML
1000 INJECTION INTRAMUSCULAR; INTRAVENOUS; SUBCUTANEOUS ONCE
Refills: 0 | Status: COMPLETED | OUTPATIENT
Start: 2022-12-05 | End: 2022-12-05

## 2022-12-05 RX ADMIN — SODIUM CHLORIDE 1000 MILLILITER(S): 9 INJECTION INTRAMUSCULAR; INTRAVENOUS; SUBCUTANEOUS at 23:13

## 2022-12-05 RX ADMIN — SODIUM CHLORIDE 1000 MILLILITER(S): 9 INJECTION INTRAMUSCULAR; INTRAVENOUS; SUBCUTANEOUS at 21:47

## 2022-12-05 NOTE — ED PROVIDER NOTE - CLINICAL SUMMARY MEDICAL DECISION MAKING FREE TEXT BOX
Patient with vague generalized symptoms fatigue malaise low energy some intermittent chest pain will do labs x-ray fluids reassess

## 2022-12-05 NOTE — ED PROVIDER NOTE - PROGRESS NOTE
Mood Disorder current/past/Impulsivity/Alcohol/Substance abuse disorders/Conduct problems current/past Stable.

## 2022-12-05 NOTE — ED PROVIDER NOTE - OBJECTIVE STATEMENT
27-year-old female presents with several days of low energy and generalized weakness some intermittent intermittent pinching sensation in the shoulder blades and chest also episodes of sensation of elevated heart rate which then resolves been no vomiting no diarrhea patient had a DVT and PE a year ago after having fracture in the leg.  She completed Lovenox and has been cleared since then.  She also has vitamin D deficiency and anemia.  No shortness of breath no cough no fever no URI symptoms. 27-year-old female presents with several days of low energy and generalized weakness some intermittent pinching sensation in the shoulder blades and chest. Also episodes of sensation of elevated heart rate which then resolves. No vomiting no diarrhea. Patient had a DVT and PE a year ago after having fracture in the right leg.  She completed Lovenox and has been cleared since then.  She also has vitamin D deficiency and anemia.  No shortness of breath no cough no fever no URI symptoms. Patient follows with hematologist Dr Hernandez and reports chronically elevated D-dimer levels despite repeated negative CTA and leg duplex studies

## 2022-12-05 NOTE — ED PROVIDER NOTE - NSFOLLOWUPINSTRUCTIONS_ED_ALL_ED_FT
Fatigue      If you have fatigue, you feel tired all the time and have a lack of energy or a lack of motivation. Fatigue may make it difficult to start or complete tasks because of exhaustion. In general, occasional or mild fatigue is often a normal response to activity or life. However, long-lasting (chronic) or extreme fatigue may be a symptom of a medical condition.      Follow these instructions at home:    General instructions     •Watch your fatigue for any changes.      •Go to bed and get up at the same time every day.      •Avoid fatigue by pacing yourself during the day and getting enough sleep at night.      •Maintain a healthy weight.      Medicines     •Take over-the-counter and prescription medicines only as told by your health care provider.      •Take a multivitamin, if told by your health care provider.       • Do not use herbal or dietary supplements unless they are approved by your health care provider.        Activity      •Exercise regularly, as told by your health care provider.      •Use or practice techniques to help you relax, such as yoga, wilfred chi, meditation, or massage therapy.        Eating and drinking      •Avoid heavy meals in the evening.      •Eat a well-balanced diet, which includes lean proteins, whole grains, plenty of fruits and vegetables, and low-fat dairy products.      •Avoid consuming too much caffeine.      •Avoid the use of alcohol.      •Drink enough fluid to keep your urine pale yellow.      Lifestyle     •Change situations that cause you stress. Try to keep your work and personal schedule in balance.      • Do not use any products that contain nicotine or tobacco, such as cigarettes and e-cigarettes. If you need help quitting, ask your health care provider.      • Do not use drugs.        Contact a health care provider if:    •Your fatigue does not get better.      •You have a fever.      •You suddenly lose or gain weight.      •You have headaches.      •You have trouble falling asleep or sleeping through the night.      •You feel angry, guilty, anxious, or sad.      •You are unable to have a bowel movement (constipation).      •Your skin is dry.      •You have swelling in your legs or another part of your body.        Get help right away if:    •You feel confused.      •Your vision is blurry.      •You feel faint or you pass out.      •You have a severe headache.      •You have severe pain in your abdomen, your back, or the area between your waist and hips (pelvis).      •You have chest pain, shortness of breath, or an irregular or fast heartbeat.      •You are unable to urinate, or you urinate less than normal.      •You have abnormal bleeding, such as bleeding from the rectum, vagina, nose, lungs, or nipples.      •You vomit blood.      •You have thoughts about hurting yourself or others.      If you ever feel like you may hurt yourself or others, or have thoughts about taking your own life, get help right away. You can go to your nearest emergency department or call:   • Your local emergency services (911 in the [place]       • A suicide crisis helpline, such as the National Suicide Prevention Lifeline at 1-493.519.6633 or 672 in the [place] This is open 24 hours a day.         Summary    •If you have fatigue, you feel tired all the time and have a lack of energy or a lack of motivation.      •Fatigue may make it difficult to start or complete tasks because of exhaustion.      •Long-lasting (chronic) or extreme fatigue may be a symptom of a medical condition.      •Exercise regularly, as told by your health care provider.      •Change situations that cause you stress. Try to keep your work and personal schedule in balance.      This information is not intended to replace advice given to you by your health care provider. Make sure you discuss any questions you have with your health care provider. Return to Emergency Department if have shortness of breath or chest pain. Followup with Dr Hernandez tomorrow  Fatigue      If you have fatigue, you feel tired all the time and have a lack of energy or a lack of motivation. Fatigue may make it difficult to start or complete tasks because of exhaustion. In general, occasional or mild fatigue is often a normal response to activity or life. However, long-lasting (chronic) or extreme fatigue may be a symptom of a medical condition.      Follow these instructions at home:    General instructions     •Watch your fatigue for any changes.      •Go to bed and get up at the same time every day.      •Avoid fatigue by pacing yourself during the day and getting enough sleep at night.      •Maintain a healthy weight.      Medicines     •Take over-the-counter and prescription medicines only as told by your health care provider.      •Take a multivitamin, if told by your health care provider.       • Do not use herbal or dietary supplements unless they are approved by your health care provider.        Activity      •Exercise regularly, as told by your health care provider.      •Use or practice techniques to help you relax, such as yoga, wilfred chi, meditation, or massage therapy.        Eating and drinking      •Avoid heavy meals in the evening.      •Eat a well-balanced diet, which includes lean proteins, whole grains, plenty of fruits and vegetables, and low-fat dairy products.      •Avoid consuming too much caffeine.      •Avoid the use of alcohol.      •Drink enough fluid to keep your urine pale yellow.      Lifestyle     •Change situations that cause you stress. Try to keep your work and personal schedule in balance.      • Do not use any products that contain nicotine or tobacco, such as cigarettes and e-cigarettes. If you need help quitting, ask your health care provider.      • Do not use drugs.        Contact a health care provider if:    •Your fatigue does not get better.      •You have a fever.      •You suddenly lose or gain weight.      •You have headaches.      •You have trouble falling asleep or sleeping through the night.      •You feel angry, guilty, anxious, or sad.      •You are unable to have a bowel movement (constipation).      •Your skin is dry.      •You have swelling in your legs or another part of your body.        Get help right away if:    •You feel confused.      •Your vision is blurry.      •You feel faint or you pass out.      •You have a severe headache.      •You have severe pain in your abdomen, your back, or the area between your waist and hips (pelvis).      •You have chest pain, shortness of breath, or an irregular or fast heartbeat.      •You are unable to urinate, or you urinate less than normal.      •You have abnormal bleeding, such as bleeding from the rectum, vagina, nose, lungs, or nipples.      •You vomit blood.      •You have thoughts about hurting yourself or others.      If you ever feel like you may hurt yourself or others, or have thoughts about taking your own life, get help right away. You can go to your nearest emergency department or call:   • Your local emergency services (911 in the [place]       • A suicide crisis helpline, such as the National Suicide Prevention Lifeline at 1-122.929.6890 or 247 in the [place] This is open 24 hours a day.         Summary    •If you have fatigue, you feel tired all the time and have a lack of energy or a lack of motivation.      •Fatigue may make it difficult to start or complete tasks because of exhaustion.      •Long-lasting (chronic) or extreme fatigue may be a symptom of a medical condition.      •Exercise regularly, as told by your health care provider.      •Change situations that cause you stress. Try to keep your work and personal schedule in balance.      This information is not intended to replace advice given to you by your health care provider. Make sure you discuss any questions you have with your health care provider.

## 2022-12-05 NOTE — ED PROVIDER NOTE - PROGRESS NOTE DETAILS
ambulated in Emergency Department. normal vitals with ambulation, heart rate in 90s, sat 98%. PT has apt with Dr Hernandez tomorrow. Patient states D-dimer has been chronically elevated. PT has had multiple CTA studies in past year after her pulmonary embolus, all negative. I did shared decision making with patient regarding ordering CTA to evaluate PE. Patient expressed hesitancy doing another CT given multiple CT recently. no shortness of breath, no tachycardia no hypoxia, vague symptoms of general malaise, make PE less likely. Will not perform CTA at this time. PT declined rvp swap. denies urinary sx

## 2022-12-05 NOTE — ED PROVIDER NOTE - PATIENT PORTAL LINK FT
You can access the FollowMyHealth Patient Portal offered by [facility] by registering at the following website: [email or web address]. By joining [employer]’s FollowMyHealth portal, you will also be able to view your health information using other applications (apps) compatible with our system.

## 2022-12-07 LAB
CULTURE RESULTS: SIGNIFICANT CHANGE UP
SPECIMEN SOURCE: SIGNIFICANT CHANGE UP

## 2022-12-18 ENCOUNTER — EMERGENCY (EMERGENCY)
Facility: HOSPITAL | Age: 27
LOS: 1 days | Discharge: HOME | End: 2022-12-18
Admitting: EMERGENCY MEDICINE

## 2022-12-18 VITALS
DIASTOLIC BLOOD PRESSURE: 78 MMHG | OXYGEN SATURATION: 100 % | TEMPERATURE: 99 F | RESPIRATION RATE: 16 BRPM | HEART RATE: 100 BPM | SYSTOLIC BLOOD PRESSURE: 135 MMHG | HEIGHT: 62 IN

## 2022-12-18 LAB
ALBUMIN SERPL ELPH-MCNC: 4.6 G/DL — SIGNIFICANT CHANGE UP (ref 3.3–5)
ALP SERPL-CCNC: 52 U/L — SIGNIFICANT CHANGE UP (ref 40–120)
ALT FLD-CCNC: 5 U/L — SIGNIFICANT CHANGE UP (ref 4–33)
ANION GAP SERPL CALC-SCNC: 17 MMOL/L — HIGH (ref 7–14)
APPEARANCE UR: ABNORMAL
APTT BLD: 29.1 SEC — SIGNIFICANT CHANGE UP (ref 27–36.3)
AST SERPL-CCNC: 13 U/L — SIGNIFICANT CHANGE UP (ref 4–32)
BACTERIA # UR AUTO: ABNORMAL
BASOPHILS # BLD AUTO: 0.04 K/UL — SIGNIFICANT CHANGE UP (ref 0–0.2)
BASOPHILS NFR BLD AUTO: 0.8 % — SIGNIFICANT CHANGE UP (ref 0–2)
BILIRUB SERPL-MCNC: 1 MG/DL — SIGNIFICANT CHANGE UP (ref 0.2–1.2)
BILIRUB UR-MCNC: NEGATIVE — SIGNIFICANT CHANGE UP
BLD GP AB SCN SERPL QL: NEGATIVE — SIGNIFICANT CHANGE UP
BUN SERPL-MCNC: 6 MG/DL — LOW (ref 7–23)
CALCIUM SERPL-MCNC: 9.4 MG/DL — SIGNIFICANT CHANGE UP (ref 8.4–10.5)
CHLORIDE SERPL-SCNC: 100 MMOL/L — SIGNIFICANT CHANGE UP (ref 98–107)
CO2 SERPL-SCNC: 20 MMOL/L — LOW (ref 22–31)
COLOR SPEC: SIGNIFICANT CHANGE UP
CREAT SERPL-MCNC: 0.6 MG/DL — SIGNIFICANT CHANGE UP (ref 0.5–1.3)
DIFF PNL FLD: ABNORMAL
EGFR: 125 ML/MIN/1.73M2 — SIGNIFICANT CHANGE UP
EOSINOPHIL # BLD AUTO: 0.06 K/UL — SIGNIFICANT CHANGE UP (ref 0–0.5)
EOSINOPHIL NFR BLD AUTO: 1.2 % — SIGNIFICANT CHANGE UP (ref 0–6)
EPI CELLS # UR: 9 /HPF — HIGH (ref 0–5)
GLUCOSE SERPL-MCNC: 67 MG/DL — LOW (ref 70–99)
GLUCOSE UR QL: NEGATIVE — SIGNIFICANT CHANGE UP
HCT VFR BLD CALC: 33.1 % — LOW (ref 34.5–45)
HGB BLD-MCNC: 9.9 G/DL — LOW (ref 11.5–15.5)
HYALINE CASTS # UR AUTO: 5 /LPF — SIGNIFICANT CHANGE UP (ref 0–7)
IANC: 3.02 K/UL — SIGNIFICANT CHANGE UP (ref 1.8–7.4)
IMM GRANULOCYTES NFR BLD AUTO: 0.2 % — SIGNIFICANT CHANGE UP (ref 0–0.9)
INR BLD: 1.44 RATIO — HIGH (ref 0.88–1.16)
KETONES UR-MCNC: ABNORMAL
LEUKOCYTE ESTERASE UR-ACNC: ABNORMAL
LIDOCAIN IGE QN: 13 U/L — SIGNIFICANT CHANGE UP (ref 7–60)
LYMPHOCYTES # BLD AUTO: 1.48 K/UL — SIGNIFICANT CHANGE UP (ref 1–3.3)
LYMPHOCYTES # BLD AUTO: 28.6 % — SIGNIFICANT CHANGE UP (ref 13–44)
MCHC RBC-ENTMCNC: 23 PG — LOW (ref 27–34)
MCHC RBC-ENTMCNC: 29.9 GM/DL — LOW (ref 32–36)
MCV RBC AUTO: 77 FL — LOW (ref 80–100)
MONOCYTES # BLD AUTO: 0.56 K/UL — SIGNIFICANT CHANGE UP (ref 0–0.9)
MONOCYTES NFR BLD AUTO: 10.8 % — SIGNIFICANT CHANGE UP (ref 2–14)
NEUTROPHILS # BLD AUTO: 3.02 K/UL — SIGNIFICANT CHANGE UP (ref 1.8–7.4)
NEUTROPHILS NFR BLD AUTO: 58.4 % — SIGNIFICANT CHANGE UP (ref 43–77)
NITRITE UR-MCNC: NEGATIVE — SIGNIFICANT CHANGE UP
NRBC # BLD: 0 /100 WBCS — SIGNIFICANT CHANGE UP (ref 0–0)
NRBC # FLD: 0 K/UL — SIGNIFICANT CHANGE UP (ref 0–0)
PH UR: 5.5 — SIGNIFICANT CHANGE UP (ref 5–8)
PLATELET # BLD AUTO: 307 K/UL — SIGNIFICANT CHANGE UP (ref 150–400)
POTASSIUM SERPL-MCNC: 3.5 MMOL/L — SIGNIFICANT CHANGE UP (ref 3.5–5.3)
POTASSIUM SERPL-SCNC: 3.5 MMOL/L — SIGNIFICANT CHANGE UP (ref 3.5–5.3)
PROT SERPL-MCNC: 7.6 G/DL — SIGNIFICANT CHANGE UP (ref 6–8.3)
PROT UR-MCNC: ABNORMAL
PROTHROM AB SERPL-ACNC: 16.8 SEC — HIGH (ref 10.5–13.4)
RBC # BLD: 4.3 M/UL — SIGNIFICANT CHANGE UP (ref 3.8–5.2)
RBC # FLD: 14.6 % — HIGH (ref 10.3–14.5)
RBC CASTS # UR COMP ASSIST: 8 /HPF — HIGH (ref 0–4)
RH IG SCN BLD-IMP: POSITIVE — SIGNIFICANT CHANGE UP
SODIUM SERPL-SCNC: 137 MMOL/L — SIGNIFICANT CHANGE UP (ref 135–145)
SP GR SPEC: 1.02 — SIGNIFICANT CHANGE UP (ref 1.01–1.05)
UROBILINOGEN FLD QL: SIGNIFICANT CHANGE UP
WBC # BLD: 5.17 K/UL — SIGNIFICANT CHANGE UP (ref 3.8–10.5)
WBC # FLD AUTO: 5.17 K/UL — SIGNIFICANT CHANGE UP (ref 3.8–10.5)
WBC UR QL: 81 /HPF — HIGH (ref 0–5)

## 2022-12-18 PROCEDURE — 76705 ECHO EXAM OF ABDOMEN: CPT | Mod: 26

## 2022-12-18 PROCEDURE — 99285 EMERGENCY DEPT VISIT HI MDM: CPT

## 2022-12-18 RX ORDER — SODIUM CHLORIDE 9 MG/ML
1000 INJECTION, SOLUTION INTRAVENOUS
Refills: 0 | Status: DISCONTINUED | OUTPATIENT
Start: 2022-12-18 | End: 2022-12-22

## 2022-12-18 RX ORDER — FAMOTIDINE 10 MG/ML
20 INJECTION INTRAVENOUS ONCE
Refills: 0 | Status: COMPLETED | OUTPATIENT
Start: 2022-12-18 | End: 2022-12-18

## 2022-12-18 RX ADMIN — Medication 30 MILLILITER(S): at 19:59

## 2022-12-18 RX ADMIN — FAMOTIDINE 20 MILLIGRAM(S): 10 INJECTION INTRAVENOUS at 19:58

## 2022-12-18 NOTE — ED ADULT NURSE NOTE - OBJECTIVE STATEMENT
A&Ox4. ambulatory. c/o abd, epigastric  pains since 12/7.  lmp 12/5. pt denies SOB, chest pain, dizziness, weakness, urinary symptoms, HA, n/v/d, fevers, chills. respirations are even and un labored. safety precautions maintained. 20g placed to RAC labs drawn and sent

## 2022-12-18 NOTE — ED PROVIDER NOTE - CARE PLAN
1 Principal Discharge DX:	Abdominal pain   Principal Discharge DX:	Acute UTI  Secondary Diagnosis:	Gallstone

## 2022-12-18 NOTE — ED PROVIDER NOTE - CLINICAL SUMMARY MEDICAL DECISION MAKING FREE TEXT BOX
Patient is a 27-year-old female with past medical history of PE presenting with epigastric pain x 11 days. -- possibly gallstones, possibly gastritis, possibly musculoskeletal, not clinically concerning for PE, bowel obstruction, appendicitis, bowel obstruction, ruptured AAA, aortic dissection -- labs, lipase, ua, ucx, RUQ sono

## 2022-12-18 NOTE — ED PROVIDER NOTE - NSFOLLOWUPINSTRUCTIONS_ED_ALL_ED_FT
Advance activity as tolerated.  Continue all previously prescribed medications as directed unless otherwise instructed.  Take Maalox, which is available over the counter -- follow instructions on label., Take Pepcid 20 mg once a day as needed for indigestion or heartburn. Follow up with your primary care physician and gastroenterology (referral list provided)  in 48-72 hours- bring copies of your results.  Return to the ER for worsening or persistent symptoms, including but not limited to worsening/persistent pain, fevers, vomiting, chest pain, black or bloody stools, fevers, passing out, lightheadedness, dizziness, and/or ANY NEW OR CONCERNING SYMPTOMS. If you have issues obtaining follow up, please call: [phone] to obtain a doctor or specialist who takes your insurance in your area.  You may call [phone] to make an appointment with the internal medicine clinic. Advance activity as tolerated.  Continue all previously prescribed medications as directed unless otherwise instructed.  Take Cefdinir 300 mg one pill twice a day for 7 days. Take Maalox, which is available over the counter -- follow instructions on label., Take Pepcid 20 mg once a day as needed for indigestion or heartburn. Follow up with your primary care physician, general surgery and gastroenterology (referral list provided)  in 48-72 hours- bring copies of your results.  Return to the ER for worsening or persistent symptoms, including but not limited to worsening/persistent pain, fevers, vomiting, chest pain, black or bloody stools, fevers, passing out, lightheadedness, dizziness, and/or ANY NEW OR CONCERNING SYMPTOMS. If you have issues obtaining follow up, please call: [phone] to obtain a doctor or specialist who takes your insurance in your area.  You may call [phone] to make an appointment with the internal medicine clinic.

## 2022-12-18 NOTE — ED ADULT TRIAGE NOTE - CHIEF COMPLAINT QUOTE
abd, epigastric  pains since 12/7 denies n,v.  states " urinary stream different" lmp 12/5  sent from [facility] for eval.  [facility]- pe  post fx ankle

## 2022-12-18 NOTE — ED PROVIDER NOTE - PROGRESS NOTE DETAILS
NKECHI CASTRO:  Pt reports pain resolved at this time.  Pt tolerating PO.  RUQ sono with following impression: "Cholelithiasis without wall thickening or pericholecystic fluid however a   positive sonographic Ojeda sign was reported. Findings are equivocal for   acute cholecystitis. Correlate clinically. Obtain HIDA scan as needed."  General surgery does not recommend any further work up; they recommend outpatient surgery follow up.  Pt medically stable for discharge.  Strict return precautions given.  Pt to follow up with PMD, surgery and gastroenterology (referral list provided).

## 2022-12-18 NOTE — ED PROVIDER NOTE - PATIENT PORTAL LINK FT
You can access the FollowMyHealth Patient Portal offered by [facility] by registering at the following website: [email or web address]. By joining [employer]’s FollowMyHealth portal, you will also be able to view your health information using other applications (apps) compatible with our system.

## 2022-12-18 NOTE — ED PROVIDER NOTE - OBJECTIVE STATEMENT
Patient is a 27-year-old female with no pertinent past medical history presenting with epigastric pain x 11 days.  The patient reports intermittent sharp epigastric and right upper quadrant pain which is nonexertional, nonpleuritic, worsens with sitting up.  Patient reports she went to urgent care today and was directed to the emergency department for evaluation gallstones.  Patient reports some urinary urgency and frequency.  Patient denies fevers, chills, nausea, vomiting, diarrhea, black/bloody stools, vaginal bleeding, vaginal discharge. Patient is a 27-year-old female with past medical history of PE presenting with epigastric pain x 11 days.  The patient reports intermittent sharp epigastric and right upper quadrant pain which is nonexertional, nonpleuritic, worsens with sitting up.  Patient reports she went to urgent care today and was directed to the emergency department for evaluation gallstones.  Patient reports some urinary urgency and frequency.  Patient denies fevers, chills, nausea, vomiting, diarrhea, black/bloody stools, vaginal bleeding, vaginal discharge, flank pain, h/o STDs, chest pain, SOB, hemoptysis, OCP use, calf pain/swelling, h/o malignancy, alcohol abuse, illicit drug use.

## 2022-12-18 NOTE — ED ADULT NURSE NOTE - CHIEF COMPLAINT QUOTE
abd, epigastric  pains since 12/7 denies n,v.  states " urinary stream different" lmp 12/5  sent from [facility] for eval.  [facility]- pe  post fx ankle

## 2022-12-19 VITALS — SYSTOLIC BLOOD PRESSURE: 107 MMHG | DIASTOLIC BLOOD PRESSURE: 63 MMHG

## 2022-12-19 RX ORDER — CEFDINIR 250 MG/5ML
1 POWDER, FOR SUSPENSION ORAL
Qty: 14 | Refills: 0
Start: 2022-12-19 | End: 2022-12-25

## 2022-12-19 RX ADMIN — SODIUM CHLORIDE 200 MILLILITER(S): 9 INJECTION, SOLUTION INTRAVENOUS at 00:58

## 2022-12-19 NOTE — ED ADULT NURSE REASSESSMENT NOTE - NS ED NURSE REASSESS COMMENT FT1
Patient resting in stretcher, respirations even and unlabored, vitals stable, comfortable, denies any complaints at this time. IV line intact, fluids running per orders. Awaiting dispo. Stretcher in lowest position, wheels locked, appropriate side rails in place, call bell near patient.

## 2022-12-19 NOTE — CONSULT NOTE ADULT - ASSESSMENT
28 y/o F p/w abdominal pain.    -Differentials include symptomatic cholelithasis vs. less likely acute cholecystitis  -Recommend PO challenge  -Cleared from Surgery perspective for discharge if tolerates regular, full diet  -Can followup with Dr. Eren Rosado in clinic for elective cholecystectomy    D/w Dr. Alonzo Hurd MD  PGY-2  B Team Surgery  #[id number]

## 2022-12-19 NOTE — CONSULT NOTE ADULT - SUBJECTIVE AND OBJECTIVE BOX
HPI: 26 y/o F w/ PMH PE (Tlovenox, years ago due to ankle fracture +immobility) who presents with 12 days of abdominal pain that she endorses comes and goes spontaneously, not precipitated by any events. She endorses she has been having regular bowl movements and that the pain does not come on with fatty meals. She denies discharge from below, but does endorse that the pain was precipitated by sexual activity. The pain does not radiate.    In the ED, VSS, AF. Normal LFT's, no leukocytosis. RUQ U/S w/ Sonographic Ojeda's and stones however no other signs of wall thickening, fluid, cbd dilation.    PMH: As above    PSH: As above    Allergies: Acyclovir    Physical exam:    BP HR [id number]     HPI: 26 y/o F w/ PMH PE (Tlovenox, years ago due to ankle fracture +immobility) who presents with 12 days of abdominal pain that she endorses comes and goes spontaneously, not precipitated by any events. She endorses she has been having regular bowl movements and that the pain does not come on with fatty meals. She denies discharge from below, but does endorse that the pain was precipitated by sexual activity. The pain does not radiate.    In the ED, VSS, AF. Normal LFT's, no leukocytosis. RUQ U/S w/ Sonographic Ojeda's and stones however no other signs of wall thickening, fluid, cbd dilation. Per ED low concern for pelvic pathology. hCG negative.     PMH: As above    PSH: As above    Allergies: Acyclovir    Physical exam:    BP 99/59 HR 81 SpO2 100 RR 16     General- NAD. Non toxic younger man  Abdomen- Soft, obese. Tender to palpation across abdomen without guarding or peritoneal signs  Lungs- Comfortable on room air    Imaging:    PROCEDURE DATE: 12/18/2022        INTERPRETATION: CLINICAL INFORMATION: Right upper quadrant abdominal/epigastric pain.    COMPARISON: None available.    TECHNIQUE: Sonography of the right upper quadrant.    FINDINGS:  Liver: Within normal limits.  Bile ducts: Normal caliber. Common bile duct measures 3 mm.  Gallbladder: Cholelithiasis and biliary sludge. No pericholecystic or wall thickening. Small focus of ringdown artifact on the anterior gallbladder wall may represent adenomyomatosis. The ultrasound sonographer reported a positive sonographic Ojeda's sign.  Pancreas: Visualized portions are within normal limits.  Right kidney: 10.0 cm. No hydronephrosis.  Ascites: None.  IVC: Visualized portions are within normal limits.    IMPRESSION:  Cholelithiasis without wall thickening or pericholecystic fluid however a positive sonographic Ojeda sign was reported. Findings are equivocal for acute cholecystitis. Correlate clinically. Obtain HIDA scan as needed.    --- End of Report ---

## 2022-12-20 LAB
CULTURE RESULTS: SIGNIFICANT CHANGE UP
SPECIMEN SOURCE: SIGNIFICANT CHANGE UP

## 2023-06-05 NOTE — ED PROVIDER NOTE - RESPIRATORY, MLM
Is the patient currently in the state of [place]? YES    Visit mode:VIDEO    If the visit is dropped, the patient can be reconnected by: VIDEO VISIT: Send to e-mail at: [email or web address]    Will anyone else be joining the visit? NO      How would you like to obtain your AVS? MyChart    Are changes needed to the allergy or medication list? NO    Reason for visit: RECHECK          
Breath sounds clear and equal bilaterally.

## 2023-06-15 ENCOUNTER — EMERGENCY (EMERGENCY)
Facility: HOSPITAL | Age: 28
LOS: 1 days | Discharge: HOME | End: 2023-06-15
Attending: EMERGENCY MEDICINE
Payer: MEDICAID

## 2023-06-15 VITALS
SYSTOLIC BLOOD PRESSURE: 101 MMHG | OXYGEN SATURATION: 98 % | HEART RATE: 82 BPM | TEMPERATURE: 99 F | RESPIRATION RATE: 20 BRPM | DIASTOLIC BLOOD PRESSURE: 66 MMHG

## 2023-06-15 VITALS
RESPIRATION RATE: 20 BRPM | DIASTOLIC BLOOD PRESSURE: 74 MMHG | WEIGHT: 154.32 LBS | HEIGHT: 63.39 IN | OXYGEN SATURATION: 95 % | SYSTOLIC BLOOD PRESSURE: 109 MMHG | TEMPERATURE: 98 F | HEART RATE: 97 BPM

## 2023-06-15 LAB
ALBUMIN SERPL ELPH-MCNC: 3.9 G/DL — SIGNIFICANT CHANGE UP (ref 3.5–5)
ALP SERPL-CCNC: 49 U/L — SIGNIFICANT CHANGE UP (ref 40–120)
ALT FLD-CCNC: 16 U/L DA — SIGNIFICANT CHANGE UP (ref 10–60)
ANION GAP SERPL CALC-SCNC: 8 MMOL/L — SIGNIFICANT CHANGE UP (ref 5–17)
APPEARANCE UR: CLEAR — SIGNIFICANT CHANGE UP
APTT BLD: 30.5 SEC — SIGNIFICANT CHANGE UP (ref 27.5–35.5)
AST SERPL-CCNC: 8 U/L — LOW (ref 10–40)
BASOPHILS # BLD AUTO: 0.04 K/UL — SIGNIFICANT CHANGE UP (ref 0–0.2)
BASOPHILS NFR BLD AUTO: 0.7 % — SIGNIFICANT CHANGE UP (ref 0–2)
BILIRUB SERPL-MCNC: 0.9 MG/DL — SIGNIFICANT CHANGE UP (ref 0.2–1.2)
BILIRUB UR-MCNC: NEGATIVE — SIGNIFICANT CHANGE UP
BUN SERPL-MCNC: 8 MG/DL — SIGNIFICANT CHANGE UP (ref 7–18)
CALCIUM SERPL-MCNC: 8.8 MG/DL — SIGNIFICANT CHANGE UP (ref 8.4–10.5)
CHLORIDE SERPL-SCNC: 109 MMOL/L — HIGH (ref 96–108)
CO2 SERPL-SCNC: 24 MMOL/L — SIGNIFICANT CHANGE UP (ref 22–31)
COLOR SPEC: YELLOW — SIGNIFICANT CHANGE UP
CREAT SERPL-MCNC: 0.69 MG/DL — SIGNIFICANT CHANGE UP (ref 0.5–1.3)
DIFF PNL FLD: ABNORMAL
EGFR: 120 ML/MIN/1.73M2 — SIGNIFICANT CHANGE UP
EOSINOPHIL # BLD AUTO: 0.09 K/UL — SIGNIFICANT CHANGE UP (ref 0–0.5)
EOSINOPHIL NFR BLD AUTO: 1.6 % — SIGNIFICANT CHANGE UP (ref 0–6)
GLUCOSE SERPL-MCNC: 104 MG/DL — HIGH (ref 70–99)
GLUCOSE UR QL: NEGATIVE — SIGNIFICANT CHANGE UP
HCG SERPL-ACNC: <1 MIU/ML — SIGNIFICANT CHANGE UP
HCT VFR BLD CALC: 32.9 % — LOW (ref 34.5–45)
HGB BLD-MCNC: 9.8 G/DL — LOW (ref 11.5–15.5)
IMM GRANULOCYTES NFR BLD AUTO: 0.2 % — SIGNIFICANT CHANGE UP (ref 0–0.9)
INR BLD: 1.23 RATIO — HIGH (ref 0.88–1.16)
KETONES UR-MCNC: NEGATIVE — SIGNIFICANT CHANGE UP
LEUKOCYTE ESTERASE UR-ACNC: NEGATIVE — SIGNIFICANT CHANGE UP
LIDOCAIN IGE QN: 64 U/L — LOW (ref 73–393)
LYMPHOCYTES # BLD AUTO: 1.43 K/UL — SIGNIFICANT CHANGE UP (ref 1–3.3)
LYMPHOCYTES # BLD AUTO: 25.1 % — SIGNIFICANT CHANGE UP (ref 13–44)
MCHC RBC-ENTMCNC: 22.6 PG — LOW (ref 27–34)
MCHC RBC-ENTMCNC: 29.8 GM/DL — LOW (ref 32–36)
MCV RBC AUTO: 76 FL — LOW (ref 80–100)
MONOCYTES # BLD AUTO: 0.48 K/UL — SIGNIFICANT CHANGE UP (ref 0–0.9)
MONOCYTES NFR BLD AUTO: 8.4 % — SIGNIFICANT CHANGE UP (ref 2–14)
NEUTROPHILS # BLD AUTO: 3.64 K/UL — SIGNIFICANT CHANGE UP (ref 1.8–7.4)
NEUTROPHILS NFR BLD AUTO: 64 % — SIGNIFICANT CHANGE UP (ref 43–77)
NITRITE UR-MCNC: NEGATIVE — SIGNIFICANT CHANGE UP
NRBC # BLD: 0 /100 WBCS — SIGNIFICANT CHANGE UP (ref 0–0)
PH UR: 6 — SIGNIFICANT CHANGE UP (ref 5–8)
PLATELET # BLD AUTO: 343 K/UL — SIGNIFICANT CHANGE UP (ref 150–400)
POTASSIUM SERPL-MCNC: 4 MMOL/L — SIGNIFICANT CHANGE UP (ref 3.5–5.3)
POTASSIUM SERPL-SCNC: 4 MMOL/L — SIGNIFICANT CHANGE UP (ref 3.5–5.3)
PROT SERPL-MCNC: 7.6 G/DL — SIGNIFICANT CHANGE UP (ref 6–8.3)
PROT UR-MCNC: NEGATIVE — SIGNIFICANT CHANGE UP
PROTHROM AB SERPL-ACNC: 14.7 SEC — HIGH (ref 10.5–13.4)
RBC # BLD: 4.33 M/UL — SIGNIFICANT CHANGE UP (ref 3.8–5.2)
RBC # FLD: 16.8 % — HIGH (ref 10.3–14.5)
SODIUM SERPL-SCNC: 141 MMOL/L — SIGNIFICANT CHANGE UP (ref 135–145)
SP GR SPEC: 1 — LOW (ref 1.01–1.02)
UROBILINOGEN FLD QL: NEGATIVE — SIGNIFICANT CHANGE UP
WBC # BLD: 5.69 K/UL — SIGNIFICANT CHANGE UP (ref 3.8–10.5)
WBC # FLD AUTO: 5.69 K/UL — SIGNIFICANT CHANGE UP (ref 3.8–10.5)

## 2023-06-15 PROCEDURE — 87086 URINE CULTURE/COLONY COUNT: CPT

## 2023-06-15 PROCEDURE — 85730 THROMBOPLASTIN TIME PARTIAL: CPT

## 2023-06-15 PROCEDURE — 85025 COMPLETE CBC W/AUTO DIFF WBC: CPT

## 2023-06-15 PROCEDURE — 86900 BLOOD TYPING SEROLOGIC ABO: CPT

## 2023-06-15 PROCEDURE — 83690 ASSAY OF LIPASE: CPT

## 2023-06-15 PROCEDURE — 76856 US EXAM PELVIC COMPLETE: CPT | Mod: 26

## 2023-06-15 PROCEDURE — 81001 URINALYSIS AUTO W/SCOPE: CPT

## 2023-06-15 PROCEDURE — 76830 TRANSVAGINAL US NON-OB: CPT

## 2023-06-15 PROCEDURE — 76830 TRANSVAGINAL US NON-OB: CPT | Mod: 26

## 2023-06-15 PROCEDURE — 99284 EMERGENCY DEPT VISIT MOD MDM: CPT

## 2023-06-15 PROCEDURE — 85610 PROTHROMBIN TIME: CPT

## 2023-06-15 PROCEDURE — 84702 CHORIONIC GONADOTROPIN TEST: CPT

## 2023-06-15 PROCEDURE — 80053 COMPREHEN METABOLIC PANEL: CPT

## 2023-06-15 PROCEDURE — 96374 THER/PROPH/DIAG INJ IV PUSH: CPT

## 2023-06-15 PROCEDURE — 76856 US EXAM PELVIC COMPLETE: CPT

## 2023-06-15 PROCEDURE — 36415 COLL VENOUS BLD VENIPUNCTURE: CPT

## 2023-06-15 PROCEDURE — 86850 RBC ANTIBODY SCREEN: CPT

## 2023-06-15 PROCEDURE — 86901 BLOOD TYPING SEROLOGIC RH(D): CPT

## 2023-06-15 PROCEDURE — 99284 EMERGENCY DEPT VISIT MOD MDM: CPT | Mod: 25

## 2023-06-15 RX ORDER — KETOROLAC TROMETHAMINE 30 MG/ML
15 SYRINGE (ML) INJECTION ONCE
Refills: 0 | Status: DISCONTINUED | OUTPATIENT
Start: 2023-06-15 | End: 2023-06-15

## 2023-06-15 RX ADMIN — Medication 15 MILLIGRAM(S): at 08:53

## 2023-06-15 NOTE — ED ADULT NURSE NOTE - NSFALLUNIVINTERV_ED_ALL_ED
Bed/Stretcher in lowest position, wheels locked, appropriate side rails in place/Call bell, personal items and telephone in reach/Instruct patient to call for assistance before getting out of bed/chair/stretcher/Non-slip footwear applied when patient is off stretcher/[place] to call system/Physically safe environment - no spills, clutter or unnecessary equipment/Purposeful proactive rounding/Room/bathroom lighting operational, light cord in reach

## 2023-06-15 NOTE — ED PROVIDER NOTE - CLINICAL SUMMARY MEDICAL DECISION MAKING FREE TEXT BOX
Patient with vaginal bleeding in the setting of her menses.  We will do labs pain control ultrasound and reassess.

## 2023-06-15 NOTE — ED PROVIDER NOTE - PATIENT PORTAL LINK FT
You can access the FollowMyHealth Patient Portal offered by [facility] by registering at the following website: [email or web address]. By joining [employer]’s FollowMyHealth portal, you will also be able to view your health information using other applications (apps) compatible with our system.

## 2023-06-15 NOTE — ED PROVIDER NOTE - OBJECTIVE STATEMENT
Lower abdominal pain.  Patient also notes that she is feeling low energy and somewhat dizzy.  Patient having difficulty difficulty quantifying how much bleeding she is experiencing, states that she uses 1 pad in a day however when she feels the blood is coming out she goes to toilet and "pushes" blood products.

## 2023-06-16 LAB
CULTURE RESULTS: SIGNIFICANT CHANGE UP
SPECIMEN SOURCE: SIGNIFICANT CHANGE UP

## 2023-08-12 NOTE — ED ADULT NURSE NOTE - CHPI ED NUR DURATION
10 today Propranolol Counseling:  I discussed with the patient the risks of propranolol including but not limited to low heart rate, low blood pressure, low blood sugar, restlessness and increased cold sensitivity. They should call the office if they experience any of these side effects.

## 2023-10-09 NOTE — H&P ADULT - MOTOR
Subjective   Patient ID: Irena Long is a 61 y.o. female who presents for the following    PHYSICAL 23  Assessment/Plan   OA: patient has severe left knee OA and moderate right knee OA and takes meloxicam.       bilateral knee OA s/p steroid injection lasted 4-5 months.     Patients    right       knee is prepared with a swab of alcohol and then Betadine swab twice. under sterile procedure using a 22 G syringe, 1ml of 2% xylocaine and 80mg of Kenelog was successfully injected into the knee compartment using a lateral approach. there was no bleeding of discomfort during or after the procedure. no complications have occurred.    Patients      left     knee is prepared with a swab of alcohol and then Betadine swab twice. under sterile procedure using a 22 G syringe, 1ml of 2% xylocaine and 80mg of Kenelog was successfully injected into the knee compartment using a lateral approach. there was no bleeding of discomfort during or after the procedure. no complications have occurred.    hld: stable, on atorvastatin       hypothyroid: tsh wnl    Ovarian cancer: likely in remission been 20 years ago now as of 23. Will continue to screen ca-125      ekg wnl     colonoscopy 2021      mammogram order given        HPI  female with hypothyroidism, osteoarthritis of the knees, seasonal allergies, TERRI/BSO ovarian cancer, cholecystectomy, , here for a        positive cologaurd and then patient did do a colonoscopy on 2021 with polyps and was told 5 year follow up     ovarian ca  survivor: patient follows with Dr Zamarripa, she gets repeat CA-125     hypothyroid: patient denies any hypo or hypothyroid symptoms. patient denies any palpitations, diarrhea or constipation    OA right and left knee: feels cold in the knees and it huts. She says that she has difficulties walking at times. She says that steroid injections have historically helped.      social: patient denies any smoking, drug (occasional  "etoh use) or alcohol abuse     family history: dad passed MI     surgical history: s/p total hysterectomy      works in government     Visit Vitals  /80 (BP Location: Left arm, Patient Position: Sitting)   Pulse 66   Temp 36.5 °C (97.7 °F)   Ht 1.702 m (5' 7\")   Wt 120 kg (265 lb)   SpO2 99%   BMI 41.50 kg/m²   Smoking Status Former   BSA 2.38 m²     PHYSICAL EXAM     General appearance: Alert and in no acute distress. speech is clear and coherent  HEENT: Sclera and conjunctiva white, EOMI,    Respiratory : No respiratory distress, normal respiratory rhythm and effort. Clear bilateral breath sounds. No wheezing or rhonchi.   Cardiovascular: heart rate regular, S1, S2. no murmurs. no Lower extremity edema   MSK: 5/5 strength upper and lower extremities without gait abnormalities. no loss of muscle mass   Neuro: 2-12 CN grossly intact.  no slurred speech. no lateralizing deficit  Psychiatric Orientation: Oriented to person, place, and time. no depression, homicidal or suicidal thoughts, normal affect     REVIEW OF SYSTEMS   Constitutional: not feeling tired and no fever, chills or sweats. Denies weight loss   no headache  Cardiovascular: no exertional chest pain, no palpitations, no lower extremity edema    Lungs: Denies shortness of breath, exertional dyspnea, wheezing  Gastrointestinal: no change in bowel habits, no diarrhea, no nausea, no vomiting    Psychiatric: no depression and no anxiety.   Urine: denies polyuria, hematuria, dysuria    MSK: no lower extremity edema. No knee effusions.     Allergies   Allergen Reactions    Percocet [Oxycodone-Acetaminophen] Hallucinations       Current Outpatient Medications   Medication Sig Dispense Refill    atorvastatin (Lipitor) 10 mg tablet Take 1 tablet (10 mg) by mouth once daily. 90 tablet 3    calcium carbonate 600 mg calcium (1,500 mg) tablet Take 1 tablet (600 mg) by mouth once daily.      cetirizine (ZyrTEC) 10 mg tablet Take 0.5 tablets (5 mg) by mouth once " daily.      cholecalciferol, vitamin D3, 10 mcg (400 unit) capsule Take 1 capsule (400 Units) by mouth.      glucosamine HCl 750 mg tablet Take by mouth.      levothyroxine (Synthroid, Levoxyl) 150 mcg tablet Take 1 tablet (150 mcg) by mouth once daily.      meloxicam (Mobic) 15 mg tablet Take 1 tablet (15 mg) by mouth once daily.      multivit-min-iron-FA-vit K-lut 8 mg iron-400 mcg-50 mcg tablet Take by mouth.       No current facility-administered medications for this visit.       Objective     Lab on 09/07/2023   Component Date Value Ref Range Status    Glucose 09/07/2023 91  74 - 99 mg/dL Final    Sodium 09/07/2023 143  136 - 145 mmol/L Final    Potassium 09/07/2023 4.9  3.5 - 5.3 mmol/L Final    Chloride 09/07/2023 104  98 - 107 mmol/L Final    Bicarbonate 09/07/2023 25  21 - 32 mmol/L Final    Anion Gap 09/07/2023 19  10 - 20 mmol/L Final    Urea Nitrogen 09/07/2023 17  6 - 23 mg/dL Final    Creatinine 09/07/2023 0.79  0.50 - 1.05 mg/dL Final    GFR Female 09/07/2023 84  >90 mL/min/1.73m2 Final    Calcium 09/07/2023 10.0  8.6 - 10.6 mg/dL Final    Albumin 09/07/2023 4.3  3.4 - 5.0 g/dL Final    Alkaline Phosphatase 09/07/2023 81  33 - 136 U/L Final    Total Protein 09/07/2023 6.5  6.4 - 8.2 g/dL Final    AST 09/07/2023 15  9 - 39 U/L Final    Total Bilirubin 09/07/2023 0.5  0.0 - 1.2 mg/dL Final    ALT (SGPT) 09/07/2023 12  7 - 45 U/L Final    Cholesterol 09/07/2023 188  0 - 199 mg/dL Final    HDL 09/07/2023 61.2  mg/dL Final    Cholesterol/HDL Ratio 09/07/2023 3.1   Final    LDL 09/07/2023 105 (H)  0 - 99 mg/dL Final    VLDL 09/07/2023 22  0 - 40 mg/dL Final    Triglycerides 09/07/2023 108  0 - 149 mg/dL Final    TSH 09/07/2023 1.54  0.44 - 3.98 mIU/L Final    Cancer  09/07/2023 10.0  0.0 - 30.2 U/mL Final       Radiology: Reviewed imaging in powerchart.  No results found.    No family history on file.  Social History     Socioeconomic History    Marital status:      Spouse name: None    Number  of children: None    Years of education: None    Highest education level: None   Occupational History    None   Tobacco Use    Smoking status: Former     Types: Cigarettes     Quit date: 1982     Years since quittin.7    Smokeless tobacco: Never   Substance and Sexual Activity    Alcohol use: Yes     Comment: on holidays    Drug use: Never    Sexual activity: None   Other Topics Concern    None   Social History Narrative    None     Social Determinants of Health     Financial Resource Strain: Not on file   Food Insecurity: Not on file   Transportation Needs: Not on file   Physical Activity: Not on file   Stress: Not on file   Social Connections: Not on file   Intimate Partner Violence: Not on file   Housing Stability: Not on file     Past Medical History:   Diagnosis Date    Allergy status to unspecified drugs, medicaments and biological substances     Atopy    Personal history of malignant neoplasm of ovary     History of ovarian cancer    Personal history of malignant neoplasm of ovary     H/O ovarian cancer    Personal history of other diseases of the musculoskeletal system and connective tissue     History of osteoarthritis    Personal history of other endocrine, nutritional and metabolic disease     History of hypothyroidism     Past Surgical History:   Procedure Laterality Date     SECTION, CLASSIC  2015     Section    CHOLECYSTECTOMY  2015    Cholecystectomy    HYSTERECTOMY  2015    Hysterectomy       Charting was completed using voice recognition technology and may include unintended errors.        Moves all exts

## 2024-01-11 NOTE — ED ADULT NURSE NOTE - EXTENSIONS OF SELF_ADULT
[facility] paperwork was faxed to Dave at [phone]. Fax confirmation was received.    None

## 2024-01-19 ENCOUNTER — EMERGENCY (EMERGENCY)
Facility: HOSPITAL | Age: 29
LOS: 1 days | Discharge: HOME | End: 2024-01-19
Attending: STUDENT IN AN ORGANIZED HEALTH CARE EDUCATION/TRAINING PROGRAM
Payer: MEDICAID

## 2024-01-19 VITALS
OXYGEN SATURATION: 96 % | RESPIRATION RATE: 18 BRPM | HEART RATE: 88 BPM | TEMPERATURE: 99 F | DIASTOLIC BLOOD PRESSURE: 64 MMHG | SYSTOLIC BLOOD PRESSURE: 101 MMHG

## 2024-01-19 VITALS
OXYGEN SATURATION: 97 % | HEART RATE: 82 BPM | DIASTOLIC BLOOD PRESSURE: 77 MMHG | WEIGHT: 162.04 LBS | SYSTOLIC BLOOD PRESSURE: 123 MMHG | RESPIRATION RATE: 18 BRPM | HEIGHT: 62 IN | TEMPERATURE: 99 F

## 2024-01-19 LAB
ACANTHOCYTES BLD QL SMEAR: SLIGHT — SIGNIFICANT CHANGE UP
ALBUMIN SERPL ELPH-MCNC: 4 G/DL — SIGNIFICANT CHANGE UP (ref 3.5–5)
ALP SERPL-CCNC: 45 U/L — SIGNIFICANT CHANGE UP (ref 40–120)
ALT FLD-CCNC: 18 U/L DA — SIGNIFICANT CHANGE UP (ref 10–60)
ANION GAP SERPL CALC-SCNC: 7 MMOL/L — SIGNIFICANT CHANGE UP (ref 5–17)
ANISOCYTOSIS BLD QL: SIGNIFICANT CHANGE UP
AST SERPL-CCNC: 10 U/L — SIGNIFICANT CHANGE UP (ref 10–40)
BASOPHILS # BLD AUTO: 0.04 K/UL — SIGNIFICANT CHANGE UP (ref 0–0.2)
BASOPHILS NFR BLD AUTO: 0.6 % — SIGNIFICANT CHANGE UP (ref 0–2)
BILIRUB SERPL-MCNC: 0.8 MG/DL — SIGNIFICANT CHANGE UP (ref 0.2–1.2)
BUN SERPL-MCNC: 8 MG/DL — SIGNIFICANT CHANGE UP (ref 7–18)
CALCIUM SERPL-MCNC: 10.3 MG/DL — SIGNIFICANT CHANGE UP (ref 8.4–10.5)
CHLORIDE SERPL-SCNC: 109 MMOL/L — HIGH (ref 96–108)
CO2 SERPL-SCNC: 24 MMOL/L — SIGNIFICANT CHANGE UP (ref 22–31)
CREAT SERPL-MCNC: 0.54 MG/DL — SIGNIFICANT CHANGE UP (ref 0.5–1.3)
EGFR: 127 ML/MIN/1.73M2 — SIGNIFICANT CHANGE UP
ELLIPTOCYTES BLD QL SMEAR: SLIGHT — SIGNIFICANT CHANGE UP
EOSINOPHIL # BLD AUTO: 0.05 K/UL — SIGNIFICANT CHANGE UP (ref 0–0.5)
EOSINOPHIL NFR BLD AUTO: 0.8 % — SIGNIFICANT CHANGE UP (ref 0–6)
GLUCOSE SERPL-MCNC: 79 MG/DL — SIGNIFICANT CHANGE UP (ref 70–99)
HCG SERPL-ACNC: <1 MIU/ML — SIGNIFICANT CHANGE UP
HCT VFR BLD CALC: 31.2 % — LOW (ref 34.5–45)
HGB BLD-MCNC: 9.4 G/DL — LOW (ref 11.5–15.5)
IMM GRANULOCYTES NFR BLD AUTO: 0.3 % — SIGNIFICANT CHANGE UP (ref 0–0.9)
LYMPHOCYTES # BLD AUTO: 0.81 K/UL — LOW (ref 1–3.3)
LYMPHOCYTES # BLD AUTO: 12.9 % — LOW (ref 13–44)
MANUAL SMEAR VERIFICATION: SIGNIFICANT CHANGE UP
MCHC RBC-ENTMCNC: 22.4 PG — LOW (ref 27–34)
MCHC RBC-ENTMCNC: 30.1 GM/DL — LOW (ref 32–36)
MCV RBC AUTO: 74.5 FL — LOW (ref 80–100)
MICROCYTES BLD QL: SIGNIFICANT CHANGE UP
MONOCYTES # BLD AUTO: 0.23 K/UL — SIGNIFICANT CHANGE UP (ref 0–0.9)
MONOCYTES NFR BLD AUTO: 3.7 % — SIGNIFICANT CHANGE UP (ref 2–14)
NEUTROPHILS # BLD AUTO: 5.13 K/UL — SIGNIFICANT CHANGE UP (ref 1.8–7.4)
NEUTROPHILS NFR BLD AUTO: 81.7 % — HIGH (ref 43–77)
NRBC # BLD: 0 /100 WBCS — SIGNIFICANT CHANGE UP (ref 0–0)
OVALOCYTES BLD QL SMEAR: SLIGHT — SIGNIFICANT CHANGE UP
PLAT MORPH BLD: NORMAL — SIGNIFICANT CHANGE UP
PLATELET # BLD AUTO: 320 K/UL — SIGNIFICANT CHANGE UP (ref 150–400)
POIKILOCYTOSIS BLD QL AUTO: SIGNIFICANT CHANGE UP
POTASSIUM SERPL-MCNC: 3.6 MMOL/L — SIGNIFICANT CHANGE UP (ref 3.5–5.3)
POTASSIUM SERPL-SCNC: 3.6 MMOL/L — SIGNIFICANT CHANGE UP (ref 3.5–5.3)
PROT SERPL-MCNC: 7.7 G/DL — SIGNIFICANT CHANGE UP (ref 6–8.3)
RBC # BLD: 4.19 M/UL — SIGNIFICANT CHANGE UP (ref 3.8–5.2)
RBC # FLD: 17.2 % — HIGH (ref 10.3–14.5)
RBC BLD AUTO: ABNORMAL
SCHISTOCYTES BLD QL AUTO: SLIGHT — SIGNIFICANT CHANGE UP
SODIUM SERPL-SCNC: 140 MMOL/L — SIGNIFICANT CHANGE UP (ref 135–145)
SPHEROCYTES BLD QL SMEAR: SIGNIFICANT CHANGE UP
TROPONIN I, HIGH SENSITIVITY RESULT: 4.8 NG/L — SIGNIFICANT CHANGE UP
WBC # BLD: 6.28 K/UL — SIGNIFICANT CHANGE UP (ref 3.8–10.5)
WBC # FLD AUTO: 6.28 K/UL — SIGNIFICANT CHANGE UP (ref 3.8–10.5)

## 2024-01-19 PROCEDURE — 93005 ELECTROCARDIOGRAM TRACING: CPT

## 2024-01-19 PROCEDURE — 70450 CT HEAD/BRAIN W/O DYE: CPT | Mod: MA

## 2024-01-19 PROCEDURE — 96361 HYDRATE IV INFUSION ADD-ON: CPT

## 2024-01-19 PROCEDURE — 80053 COMPREHEN METABOLIC PANEL: CPT

## 2024-01-19 PROCEDURE — 99285 EMERGENCY DEPT VISIT HI MDM: CPT | Mod: 25

## 2024-01-19 PROCEDURE — 70450 CT HEAD/BRAIN W/O DYE: CPT | Mod: 26,MA

## 2024-01-19 PROCEDURE — 84702 CHORIONIC GONADOTROPIN TEST: CPT

## 2024-01-19 PROCEDURE — 85025 COMPLETE CBC W/AUTO DIFF WBC: CPT

## 2024-01-19 PROCEDURE — 36415 COLL VENOUS BLD VENIPUNCTURE: CPT

## 2024-01-19 PROCEDURE — 84484 ASSAY OF TROPONIN QUANT: CPT

## 2024-01-19 PROCEDURE — 96374 THER/PROPH/DIAG INJ IV PUSH: CPT

## 2024-01-19 RX ORDER — KETOROLAC TROMETHAMINE 30 MG/ML
15 SYRINGE (ML) INJECTION ONCE
Refills: 0 | Status: DISCONTINUED | OUTPATIENT
Start: 2024-01-19 | End: 2024-01-19

## 2024-01-19 RX ORDER — MECLIZINE HCL 12.5 MG
25 TABLET ORAL ONCE
Refills: 0 | Status: COMPLETED | OUTPATIENT
Start: 2024-01-19 | End: 2024-01-19

## 2024-01-19 RX ORDER — SODIUM CHLORIDE 9 MG/ML
1000 INJECTION INTRAMUSCULAR; INTRAVENOUS; SUBCUTANEOUS ONCE
Refills: 0 | Status: COMPLETED | OUTPATIENT
Start: 2024-01-19 | End: 2024-01-19

## 2024-01-19 RX ORDER — METOCLOPRAMIDE HCL 10 MG
10 TABLET ORAL ONCE
Refills: 0 | Status: COMPLETED | OUTPATIENT
Start: 2024-01-19 | End: 2024-01-19

## 2024-01-19 RX ORDER — AMOXICILLIN 250 MG/5ML
1 SUSPENSION, RECONSTITUTED, ORAL (ML) ORAL
Qty: 15 | Refills: 0
Start: 2024-01-19 | End: 2024-01-23

## 2024-01-19 RX ORDER — MECLIZINE HCL 12.5 MG
1 TABLET ORAL
Qty: 10 | Refills: 0
Start: 2024-01-19 | End: 2024-01-23

## 2024-01-19 RX ADMIN — Medication 10 MILLIGRAM(S): at 08:18

## 2024-01-19 RX ADMIN — SODIUM CHLORIDE 1000 MILLILITER(S): 9 INJECTION INTRAMUSCULAR; INTRAVENOUS; SUBCUTANEOUS at 09:18

## 2024-01-19 RX ADMIN — Medication 25 MILLIGRAM(S): at 08:17

## 2024-01-19 RX ADMIN — SODIUM CHLORIDE 1000 MILLILITER(S): 9 INJECTION INTRAMUSCULAR; INTRAVENOUS; SUBCUTANEOUS at 08:18

## 2024-01-19 NOTE — ED ADULT TRIAGE NOTE - CHIEF COMPLAINT QUOTE
Pt stated when she was getting from bed she became dizzy and when she stopped she still felt like she was moving ,with nausea

## 2024-01-19 NOTE — ED PROVIDER NOTE - PATIENT PORTAL LINK FT
You can access the FollowMyHealth Patient Portal offered by [facility] by registering at the following website: [email or web address]. By joining [employer]’s FollowMyHealth portal, you will also be able to view your health information using other applications (apps) compatible with our system.

## 2024-01-19 NOTE — ED PROVIDER NOTE - CLINICAL SUMMARY MEDICAL DECISION MAKING FREE TEXT BOX
Michelle: 28-year-old female with past medical history PE (2021 secondary to leg fracture, not on AC) presents with dizziness x 3 hours.  Patient states she was going to sleep and start experiencing sudden onset dizziness associated nausea and 2 episodes of nonbloody nonbilious emesis.  Patient states dizziness worse with position change.  Patient reports bilateral headache, but denies any fevers, vision change, chest pain, shortness of breath, abdominal pain, diarrhea, bloody stools, black tarry stools, dysuria, numbness, focal weakness, or rash.  Patient also reports right lower dental pain, states she fractured her tooth approximately 1 week ago.  Patient tolerating oral intake.  Physical exam per above. No evidence of a cardiac arrythmia such as Brugada, WPW, HOCM, long or short QT.  Neurologic exam is nonfocal, not c/w CVA or primary neurologic abnormality. Will obtain labs r/o anemia, imaging, provide supportive treatment with dispo pending workup.

## 2024-01-19 NOTE — ED PROVIDER NOTE - OBJECTIVE STATEMENT
28-year-old female with past medical history PE (2021 secondary to leg fracture, not on AC) presents with dizziness x 3 hours.  Patient states she was going to sleep and start experiencing sudden onset dizziness associated nausea and 2 episodes of nonbloody nonbilious emesis.  Patient states dizziness worse with position change.  Patient reports bilateral headache, but denies any fevers, vision change, chest pain, shortness of breath, abdominal pain, diarrhea, bloody stools, black tarry stools, dysuria, numbness, focal weakness, or rash.  Patient also reports right lower dental pain, states she fractured her tooth approximately 1 week ago.  Patient tolerating oral intake.  Denies any additional complaints.

## 2024-01-19 NOTE — ED PROVIDER NOTE - PHYSICAL EXAMINATION
CONSTITUTIONAL: non-toxic, well appearing  SKIN: no rash, no petechiae.  EYES: PERRL, EOMI, pink conjunctiva, anicteric  ENT: tongue and uvular midline, no exudates, moist mucous membranes  NECK: Supple; no meningismus, no JVD  CARD: RRR, no murmurs, equal radial pulses bilaterally 2+  RESP: CTAB, no respiratory distress  ABD: Soft, non-tender, non-distended, no peritoneal signs, no CVA tenderness  EXT: Normal ROM x4. No edema. No calf tenderness.  NEURO: Alert, oriented. Neuro exam nonfocal, equal strength bilaterally. CN II-XII intact.   PSYCH: Cooperative, appropriate.

## 2024-01-19 NOTE — ED PROVIDER NOTE - PROGRESS NOTE DETAILS
Justin Doty, DO: received s/ o on pt. Patient reassessed, NAD, non-toxic appearing. results dw pt/family, questions answered. improved but persistent sx. ambulating independently. dc w/ strict return precautions. denies recent neck manipulation, trauma. has hx of similar sx.

## 2024-01-19 NOTE — ED PROVIDER NOTE - NSFOLLOWUPINSTRUCTIONS_ED_ALL_ED_FT
1) Please follow up with your Primary Care Provider in 24-48 hours  2) Seek immediate medical care for any new or returning symptoms including but not limited severe pain, high fevers, dizziness, difficulty walking, numbness and/or tingling in your extremities  3) Take Meclizine 25 mg every 12 hours as needed for dizziness  4) Take Amoxicillin three times a day. Take this medication with food.

## 2024-01-21 ENCOUNTER — EMERGENCY (EMERGENCY)
Facility: HOSPITAL | Age: 29
LOS: 1 days | Discharge: HOME | End: 2024-01-21
Attending: STUDENT IN AN ORGANIZED HEALTH CARE EDUCATION/TRAINING PROGRAM
Payer: MEDICAID

## 2024-01-21 VITALS
DIASTOLIC BLOOD PRESSURE: 75 MMHG | SYSTOLIC BLOOD PRESSURE: 120 MMHG | HEIGHT: 62 IN | HEART RATE: 89 BPM | WEIGHT: 164.02 LBS | OXYGEN SATURATION: 99 % | RESPIRATION RATE: 17 BRPM | TEMPERATURE: 98 F

## 2024-01-21 PROCEDURE — 99285 EMERGENCY DEPT VISIT HI MDM: CPT

## 2024-01-21 NOTE — ED ADULT TRIAGE NOTE - BMI (KG/M2)
I have personally seen and examined this patient.  I have fully participated in the care of this patient. I have reviewed all pertinent clinical information, including history, physical exam, plan and the Resident’s note and agree except as noted.
28

## 2024-01-22 VITALS
SYSTOLIC BLOOD PRESSURE: 115 MMHG | DIASTOLIC BLOOD PRESSURE: 70 MMHG | TEMPERATURE: 98 F | RESPIRATION RATE: 18 BRPM | OXYGEN SATURATION: 100 %

## 2024-01-22 LAB
ANION GAP SERPL CALC-SCNC: 8 MMOL/L — SIGNIFICANT CHANGE UP (ref 5–17)
BLD GP AB SCN SERPL QL: SIGNIFICANT CHANGE UP
BUN SERPL-MCNC: 9 MG/DL — SIGNIFICANT CHANGE UP (ref 7–18)
CALCIUM SERPL-MCNC: 9.1 MG/DL — SIGNIFICANT CHANGE UP (ref 8.4–10.5)
CHLORIDE SERPL-SCNC: 108 MMOL/L — SIGNIFICANT CHANGE UP (ref 96–108)
CO2 SERPL-SCNC: 22 MMOL/L — SIGNIFICANT CHANGE UP (ref 22–31)
CREAT SERPL-MCNC: 0.6 MG/DL — SIGNIFICANT CHANGE UP (ref 0.5–1.3)
EGFR: 124 ML/MIN/1.73M2 — SIGNIFICANT CHANGE UP
GLUCOSE SERPL-MCNC: 68 MG/DL — LOW (ref 70–99)
HCG UR QL: NEGATIVE — SIGNIFICANT CHANGE UP
HCT VFR BLD CALC: 32.9 % — LOW (ref 34.5–45)
HGB BLD-MCNC: 9.5 G/DL — LOW (ref 11.5–15.5)
LIDOCAIN IGE QN: 16 U/L — SIGNIFICANT CHANGE UP (ref 13–75)
MCHC RBC-ENTMCNC: 22.5 PG — LOW (ref 27–34)
MCHC RBC-ENTMCNC: 28.9 GM/DL — LOW (ref 32–36)
MCV RBC AUTO: 78 FL — LOW (ref 80–100)
NRBC # BLD: 0 /100 WBCS — SIGNIFICANT CHANGE UP (ref 0–0)
PLATELET # BLD AUTO: 316 K/UL — SIGNIFICANT CHANGE UP (ref 150–400)
POTASSIUM SERPL-MCNC: 3.7 MMOL/L — SIGNIFICANT CHANGE UP (ref 3.5–5.3)
POTASSIUM SERPL-SCNC: 3.7 MMOL/L — SIGNIFICANT CHANGE UP (ref 3.5–5.3)
RBC # BLD: 4.22 M/UL — SIGNIFICANT CHANGE UP (ref 3.8–5.2)
RBC # FLD: 17.2 % — HIGH (ref 10.3–14.5)
SODIUM SERPL-SCNC: 138 MMOL/L — SIGNIFICANT CHANGE UP (ref 135–145)
WBC # BLD: 4.41 K/UL — SIGNIFICANT CHANGE UP (ref 3.8–10.5)
WBC # FLD AUTO: 4.41 K/UL — SIGNIFICANT CHANGE UP (ref 3.8–10.5)

## 2024-01-22 PROCEDURE — 86900 BLOOD TYPING SEROLOGIC ABO: CPT

## 2024-01-22 PROCEDURE — 74177 CT ABD & PELVIS W/CONTRAST: CPT | Mod: 26,MA

## 2024-01-22 PROCEDURE — 96374 THER/PROPH/DIAG INJ IV PUSH: CPT

## 2024-01-22 PROCEDURE — 80048 BASIC METABOLIC PNL TOTAL CA: CPT

## 2024-01-22 PROCEDURE — 86850 RBC ANTIBODY SCREEN: CPT

## 2024-01-22 PROCEDURE — 86901 BLOOD TYPING SEROLOGIC RH(D): CPT

## 2024-01-22 PROCEDURE — 83690 ASSAY OF LIPASE: CPT

## 2024-01-22 PROCEDURE — 74177 CT ABD & PELVIS W/CONTRAST: CPT | Mod: MA

## 2024-01-22 PROCEDURE — 99284 EMERGENCY DEPT VISIT MOD MDM: CPT | Mod: 25

## 2024-01-22 PROCEDURE — 85027 COMPLETE CBC AUTOMATED: CPT

## 2024-01-22 PROCEDURE — 36415 COLL VENOUS BLD VENIPUNCTURE: CPT

## 2024-01-22 PROCEDURE — 81025 URINE PREGNANCY TEST: CPT

## 2024-01-22 RX ORDER — SODIUM CHLORIDE 9 MG/ML
1000 INJECTION INTRAMUSCULAR; INTRAVENOUS; SUBCUTANEOUS ONCE
Refills: 0 | Status: COMPLETED | OUTPATIENT
Start: 2024-01-22 | End: 2024-01-22

## 2024-01-22 RX ORDER — FAMOTIDINE 10 MG/ML
20 INJECTION INTRAVENOUS ONCE
Refills: 0 | Status: DISCONTINUED | OUTPATIENT
Start: 2024-01-22 | End: 2024-01-22

## 2024-01-22 RX ORDER — FAMOTIDINE 10 MG/ML
20 INJECTION INTRAVENOUS ONCE
Refills: 0 | Status: COMPLETED | OUTPATIENT
Start: 2024-01-22 | End: 2024-01-22

## 2024-01-22 RX ADMIN — FAMOTIDINE 20 MILLIGRAM(S): 10 INJECTION INTRAVENOUS at 03:14

## 2024-01-22 RX ADMIN — SODIUM CHLORIDE 1000 MILLILITER(S): 9 INJECTION INTRAMUSCULAR; INTRAVENOUS; SUBCUTANEOUS at 03:14

## 2024-01-22 NOTE — ED PROVIDER NOTE - PATIENT PORTAL LINK FT
You can access the FollowMyHealth Patient Portal offered by [facility] by registering at the following website: [email or web address]. By joining [employer]’s FollowMyHealth portal, you will also be able to view your health information using other applications (apps) compatible with our system.

## 2024-01-22 NOTE — ED PROVIDER NOTE - CLINICAL SUMMARY MEDICAL DECISION MAKING FREE TEXT BOX
After introducing myself to the patient and/or family I have performed a medical history, review of systems, and physical examination. I have formulated a differential diagnosis and plan of care for this visit and discussed this with the patient and/or family. I have also addressed the risks and benefits of diagnostic and treatment modalities planned for this visit.     Vital Signs: Reviewed the patient's vital signs     Nursing Notes: Reviewed and utilized the nursing notes     Old Medical Records: The patient's available past medical records and past encounters were reviewed     Laboratory Studies: Ordered and independently interpreted laboratory test, see above     Imaging Studies: Imaging studies ordered. Radiologist interpretation reviewed. I have independently reviewed imaging.    Patient presents with lower abdominal pain. Abdominal exam without peritoneal signs. No evidence of acute abdomen at this time. Well appearing.     Considered and doubt ovarian torsion given history and presentation. Given work up low suspicion for acute hepatobiliary disease (including acute cholecystitis), acute pancreatitis (neg lipase), PUD and gastric perforation, acute infectious processes (pneumonia, hepatitis, pyelonephritis), acute appendicitis, vascular catastrophe, bowel obstruction or viscus perforation, diverticulitis.

## 2024-01-22 NOTE — ED PROVIDER NOTE - OBJECTIVE STATEMENT
28 F (-) PMHx presents to ED for abd pain. Patient reports RLQ pain since today. Reports buring sensation since this morning.     GENERAL APPEARANCE:  AxOx4, generally well-appearing, no acute distress.  HEENT:  NC, AT. MMM. EOMI, clear conjunctiva, oropharynx clear.  NECK:  Supple without lymphadenopathy.  No stiffness or restricted ROM.  HEART:  Normal rate and regular rhythm, normal S1/S1, no m/r/g  LUNGS:  CTAB, moving air well. No crackles or wheezes are heard.  ABDOMEN:  Soft, nontender, nondistended with good bowel sounds heard.  BACK: No CVAT, no obvious deformity.  EXTREMITIES:  Without cyanosis, clubbing or edema.  NEUROLOGICAL:  Grossly nonfocal. Alert and oriented, moving all 4 extremities. CN II-XII grossly intact. Observed to ambulate with normal gait.  Skin:  Warm and dry without any rash. 28 F (-) PMHx presents to ED for abd pain. Patient reports RLQ pain since today. Reports burning sensation since this morning. Reports normal appetite. Denies nausea, vomiting, diarrhea.     GENERAL APPEARANCE:  AxOx4, generally well-appearing, no acute distress.  HEENT:  NC, AT. MMM. EOMI, clear conjunctiva, oropharynx clear.  NECK:  Supple without lymphadenopathy.  No stiffness or restricted ROM.  HEART:  Normal rate and regular rhythm, normal S1/S1, no m/r/g  LUNGS:  CTAB, moving air well. No crackles or wheezes are heard.  ABDOMEN:  Soft, nontender, nondistended with good bowel sounds heard.  BACK: No CVAT, no obvious deformity.  EXTREMITIES:  Without cyanosis, clubbing or edema.  NEUROLOGICAL:  Grossly nonfocal. Alert and oriented, moving all 4 extremities. CN II-XII grossly intact. Observed to ambulate with normal gait.  Skin:  Warm and dry without any rash.

## 2024-05-01 ENCOUNTER — EMERGENCY (EMERGENCY)
Facility: HOSPITAL | Age: 29
LOS: 1 days | Discharge: HOME | End: 2024-05-01
Attending: EMERGENCY MEDICINE
Payer: MEDICAID

## 2024-05-01 VITALS
TEMPERATURE: 98 F | OXYGEN SATURATION: 98 % | RESPIRATION RATE: 18 BRPM | DIASTOLIC BLOOD PRESSURE: 68 MMHG | SYSTOLIC BLOOD PRESSURE: 92 MMHG | WEIGHT: 158.73 LBS | HEART RATE: 86 BPM | HEIGHT: 62 IN

## 2024-05-01 LAB
HCG SERPL-ACNC: <1 MIU/ML — SIGNIFICANT CHANGE UP
HCG UR QL: NEGATIVE — SIGNIFICANT CHANGE UP

## 2024-05-01 PROCEDURE — 81025 URINE PREGNANCY TEST: CPT

## 2024-05-01 PROCEDURE — 70490 CT SOFT TISSUE NECK W/O DYE: CPT | Mod: 26,MC

## 2024-05-01 PROCEDURE — 84702 CHORIONIC GONADOTROPIN TEST: CPT

## 2024-05-01 PROCEDURE — 99284 EMERGENCY DEPT VISIT MOD MDM: CPT | Mod: 25

## 2024-05-01 PROCEDURE — 70490 CT SOFT TISSUE NECK W/O DYE: CPT | Mod: MC

## 2024-05-01 PROCEDURE — 36415 COLL VENOUS BLD VENIPUNCTURE: CPT

## 2024-05-01 NOTE — ED PROVIDER NOTE - PROGRESS NOTE DETAILS
No radiopaque foreign body seen on CT.  Patient breathing comfortably tolerating oral secretions.  Will DC with ENT follow-up

## 2024-05-01 NOTE — ED ADULT TRIAGE NOTE - CHIEF COMPLAINT QUOTE
pt reports that  she swallowed  a  fish bone  about 5 hours ago and it stuck in the right side of the throat  . denies SOB , tongue swelling

## 2024-05-01 NOTE — ED PROVIDER NOTE - OBJECTIVE STATEMENT
29-year-old female history of anemia presents ED with complaint of fishbone in throat.  As per patient around 1 in the morning she was eating some fish and felt the bones in her throat.  Patient says she was able to pull 1 bone out but another one feels deep and she is unable to reach it.  No shortness of breath, no nausea, no vomiting, patient tolerating oral secretions.  Patient breathing comfortably

## 2024-05-01 NOTE — ED PROVIDER NOTE - WET READ LAUNCH FT
Patient requests all Lab and Radiology Results on their Discharge Instructions There are no Wet Read(s) to document.

## 2024-05-01 NOTE — ED PROVIDER NOTE - CLINICAL SUMMARY MEDICAL DECISION MAKING FREE TEXT BOX
29-year-old female presents ED with feeling fishbone stuck in throat.  Nothing appreciated on visual inspection of oropharynx.  Will get CT scan and reassess

## 2024-05-01 NOTE — ED ADULT NURSE NOTE - NSFALLUNIVINTERV_ED_ALL_ED
Bed/Stretcher in lowest position, wheels locked, appropriate side rails in place/Call bell, personal items and telephone in reach/Instruct patient to call for assistance before getting out of bed/chair/stretcher/Non-slip footwear applied when patient is off stretcher/[place] to call system/Physically safe environment - no spills, clutter or unnecessary equipment/Purposeful proactive rounding/Room/bathroom lighting operational, light cord in reach

## 2024-05-01 NOTE — ED PROVIDER NOTE - NSFOLLOWUPINSTRUCTIONS_ED_ALL_ED_FT
Sore Throat  A sore throat is pain, burning, irritation, or scratchiness in the throat. When you have a sore throat, you may feel pain or tenderness in your throat when you swallow or talk.    Many things can cause a sore throat, including:  An infection.  Seasonal allergies.  Dryness in the air.  Irritants, such as smoke or pollution.  Radiation treatment for cancer.  Gastroesophageal reflux disease (GERD).  A tumor.  A sore throat is often the first sign of another sickness. It may happen with other symptoms, such as coughing, sneezing, fever, and swollen neck glands. Most sore throats go away without medical treatment.    Follow these instructions at home:  Juice, water, and tea.   A do not smoke cigarettes sign.   Medicines    Take over-the-counter and prescription medicines only as told by your health care provider.  Children often get sore throats. Do not give your child aspirin because of the association with Reye's syndrome.  Use throat sprays to soothe your throat as told by your health care provider.  Managing pain    To help with pain, try:  Sipping warm liquids, such as broth, herbal tea, or warm water.  Eating or drinking cold or frozen liquids, such as frozen ice pops.  Gargling with a mixture of salt and water 3–4 times a day or as needed. To make salt water, completely dissolve ½–1 tsp (3–6 g) of salt in 1 cup (237 mL) of warm water.  Sucking on hard candy or throat lozenges.  Putting a cool-mist humidifier in your bedroom at night to moisten the air.  Sitting in the bathroom with the door closed for 5–10 minutes while you run hot water in the shower.  General instructions    Do not use any products that contain nicotine or tobacco. These products include cigarettes, chewing tobacco, and vaping devices, such as e-cigarettes. If you need help quitting, ask your health care provider.  Rest as needed.  Drink enough fluid to keep your urine pale yellow.  Wash your hands often with soap and water for at least 20 seconds. If soap and water are not available, use hand .  Contact a health care provider if:  You have a fever for more than 2–3 days.  You have symptoms that last for more than 2–3 days.  Your throat does not get better within 7 days.  You have a fever and your symptoms suddenly get worse.  Get help right away if:  You have difficulty breathing.  You cannot swallow fluids, soft foods, or your saliva.  You have increased swelling in your throat or neck.  You have persistent nausea and vomiting.  These symptoms may represent a serious problem that is an emergency. Do not wait to see if the symptoms will go away. Get medical help right away. Call your local emergency services (911 in the [place] Do not drive yourself to the hospital.    Summary  A sore throat is pain, burning, irritation, or scratchiness in the throat. Many things can cause a sore throat.  Take over-the-counter medicines only as told by your health care provider.  Rest as needed.  Drink enough fluid to keep your urine pale yellow.  Contact a health care provider if your throat does not get better within 7 days.  This information is not intended to replace advice given to you by your health care provider. Make sure you discuss any questions you have with your health care provider.    Document Revised: 03/16/2022 Document Reviewed: 03/16/2022  [employer] Patient Education © 2024 [employer]

## 2024-05-01 NOTE — ED ADULT NURSE NOTE - BIRTH SEX
[FreeTextEntry8] : c/o cough / congestion x 4 days \par - no fever - runny to stuffy nose - green phlem - from nose and coughing \par - SOb at base line - no Cp or sob or palpitation no sore throat - feeling tired , sinus headace \par - no Gu or gi symptoms\par - visiting [place] from [place] - here 2 weeks will be leaving this saturday \par \par \par INr was checked 5/14 th - 2.5 \par AIc has been < 7 
Female

## 2024-05-01 NOTE — ED ADULT NURSE NOTE - OBJECTIVE STATEMENT
Patient c/o throat discomfort x yesterday when accidently ingesting a fish bone while eating salmon. Denies chest pain, no SOB.

## 2024-05-01 NOTE — ED PROVIDER NOTE - PATIENT PORTAL LINK FT
You can access the FollowMyHealth Patient Portal offered by [facility] by registering at the following website: [email or web address]. By joining [employer]’s FollowMyHealth portal, you will also be able to view your health information using other applications (apps) compatible with our system.